# Patient Record
Sex: FEMALE | Race: OTHER | Employment: OTHER | URBAN - METROPOLITAN AREA
[De-identification: names, ages, dates, MRNs, and addresses within clinical notes are randomized per-mention and may not be internally consistent; named-entity substitution may affect disease eponyms.]

---

## 2017-07-15 NOTE — LETTER
09/05/19        Chayo Cancel  [address] [facility]      Dear Benoit Mcmillan,    [facility] records indicate that you have outstanding lab work and or testing that was ordered for you and has not yet been completed:  Orders Placed This Enc no

## 2018-02-08 ENCOUNTER — OFFICE VISIT (OUTPATIENT)
Dept: FAMILY MEDICINE CLINIC | Facility: CLINIC | Age: 58
End: 2018-02-08

## 2018-02-08 VITALS
BODY MASS INDEX: 19.33 KG/M2 | HEIGHT: 65 IN | SYSTOLIC BLOOD PRESSURE: 109 MMHG | HEART RATE: 66 BPM | TEMPERATURE: 98 F | WEIGHT: 116 LBS | DIASTOLIC BLOOD PRESSURE: 73 MMHG

## 2018-02-08 DIAGNOSIS — J01.10 ACUTE NON-RECURRENT FRONTAL SINUSITIS: Primary | ICD-10-CM

## 2018-02-08 PROCEDURE — 99213 OFFICE O/P EST LOW 20 MIN: CPT | Performed by: NURSE PRACTITIONER

## 2018-02-08 RX ORDER — BENZONATATE 100 MG/1
100 CAPSULE ORAL 3 TIMES DAILY PRN
Qty: 30 CAPSULE | Refills: 0 | Status: SHIPPED | OUTPATIENT
Start: 2018-02-08 | End: 2018-02-13

## 2018-02-08 RX ORDER — AMOXICILLIN AND CLAVULANATE POTASSIUM 875; 125 MG/1; MG/1
1 TABLET, FILM COATED ORAL 2 TIMES DAILY
Qty: 20 TABLET | Refills: 0 | Status: SHIPPED | OUTPATIENT
Start: 2018-02-08 | End: 2018-02-13

## 2018-02-08 NOTE — PROGRESS NOTES
HPI  3 weeks ago had gastroenteritis for 3 days. A week later developed headache, congestion with yellow nasal discharge. Has sore throat, intermittant fever. Has tried all different otc medications. con't to be nausea with some intermittant vomiting. 875-125 MG Oral Tab Take 1 tablet by mouth 2 (two) times daily. Disp: 20 tablet Rfl: 0   benzonatate (TESSALON PERLES) 100 MG Oral Cap Take 1 capsule (100 mg total) by mouth 3 (three) times daily as needed for cough.  Disp: 30 capsule Rfl: 0       Allergies 10 days  Tessalon perles 100 mg I po tid prn  Supportive care discussed    Please call if symptoms worsen or are not resolving. Discussed plan of care with pt and pt is in agreement. All questions answered. Pt to call with questions or concerns.

## 2018-02-08 NOTE — PATIENT INSTRUCTIONS
Yamilet fox (leslie kapoor)    Nadege to tricia dunlap?nione kellym wewn?trz ko?ci twarzy. S? one po? ?czone z wewn?trzn? cz??ci? nosa. Yamilet fox to quynh tony wy?cielaj?cej jam? nadege.  Yamilet fox mo?e wyst?pi? w · Mo?na stosowa? dost?pne bez recepty preparaty udra?niaj?ce nos, chyba, ?e lekarz zapisa? ju? inny lek o podobnym dzia?aniu. Atomizery do nosa dzia?aj? lida. karon Dos Santos fenylefryn? lub oksymetazolin? Tanda Armor delikatnie wydmuchaj n · Gor?jie cardenas?ca 100,4ºF (38ºC) lub wy?sza, lub wg wskazówek lekarza  · Napad  · Problemy z oddychaniem  · Objawy twin ust?puj? po 10 dniach  Date Last Reviewed: 4/13/2015  © 6125-4661 [employer].  [facility] [address] [place] [address]

## 2018-02-09 ENCOUNTER — TELEPHONE (OUTPATIENT)
Dept: FAMILY MEDICINE CLINIC | Facility: CLINIC | Age: 58
End: 2018-02-09

## 2018-02-09 NOTE — TELEPHONE ENCOUNTER
Patient has been taking Amoxicillin and has been with Diarrhea since  Patient would like to know if she can change the medication  Please advs

## 2018-02-10 NOTE — TELEPHONE ENCOUNTER
Pt informed of WESLEY LORA orders and verbalized understanding. Scheduled f/u appt with Nirmala Bojorquez. GRISELDA 2/13/18 @ 1:45pm ADO.

## 2018-02-10 NOTE — TELEPHONE ENCOUNTER
Tasked to on call Chris LORA. Pt started amoxicillin Thursday 8th for sinusitis but has been having diarrhea several times a day until present. Unable to eat because it goes right thru her.    Advised pt to stop antibiotic and explained the brat diet,

## 2018-02-13 ENCOUNTER — OFFICE VISIT (OUTPATIENT)
Dept: FAMILY MEDICINE CLINIC | Facility: CLINIC | Age: 58
End: 2018-02-13

## 2018-02-13 VITALS
SYSTOLIC BLOOD PRESSURE: 111 MMHG | HEART RATE: 57 BPM | HEIGHT: 65 IN | BODY MASS INDEX: 19.33 KG/M2 | WEIGHT: 116 LBS | DIASTOLIC BLOOD PRESSURE: 69 MMHG | TEMPERATURE: 97 F

## 2018-02-13 DIAGNOSIS — J01.00 ACUTE MAXILLARY SINUSITIS, RECURRENCE NOT SPECIFIED: Primary | ICD-10-CM

## 2018-02-13 PROCEDURE — 99213 OFFICE O/P EST LOW 20 MIN: CPT | Performed by: NURSE PRACTITIONER

## 2018-02-13 RX ORDER — GUAIFENESIN AND CODEINE PHOSPHATE 100; 10 MG/5ML; MG/5ML
SOLUTION ORAL
Qty: 118 ML | Refills: 0 | Status: SHIPPED | OUTPATIENT
Start: 2018-02-13 | End: 2018-08-03

## 2018-02-13 RX ORDER — LEVOFLOXACIN 500 MG/1
500 TABLET, FILM COATED ORAL DAILY
Qty: 10 TABLET | Refills: 0 | Status: SHIPPED | OUTPATIENT
Start: 2018-02-13 | End: 2018-02-23

## 2018-02-13 NOTE — PROGRESS NOTES
HPI    Pt here for f/u coughing and congestion. Did not take augmentin due to diarrhea-states that she gets severe diarrhea with pcn and forgot to mention when she was in office last week. Allergy list updated to include pcn allergy.  Con't to cough, feel 1 tsp every 6 hours for cough as needed. May cause drowsiness Disp: 118 mL Rfl: 0       Allergies:    Milk-Related Compou*    Diarrhea  Penicillins             Diarrhea    Physical Exam   Constitutional: She is oriented to person, place, and time.  She appe pcn  con't tessalon prn  Add levaquin 500 mg I po q day x 10 days  cheratussin ac 1 tsp po q 6 hrs prn-may cause drowsiness-do not drive while taking    Please call if symptoms worsen or are not resolving.             Discussed plan of care with pt and pt i

## 2018-02-13 NOTE — PATIENT INSTRUCTIONS
Acute Sinusitis    Acute sinusitis is irritation and swelling of the sinuses. It is usually caused by a viral infection after a common cold. Your doctor can help you find relief. What is acute sinusitis?   Sinuses are air-filled spaces in the skull behin © 5977-2373 [employer]. [facility] [facility], [facility] [employer]. All rights reserved. This information is not intended as a substitute for professional medical care. Always follow your healthcare professional's instructions.

## 2018-08-03 ENCOUNTER — OFFICE VISIT (OUTPATIENT)
Dept: FAMILY MEDICINE CLINIC | Facility: CLINIC | Age: 58
End: 2018-08-03

## 2018-08-03 VITALS
HEIGHT: 65 IN | WEIGHT: 115 LBS | BODY MASS INDEX: 19.16 KG/M2 | DIASTOLIC BLOOD PRESSURE: 83 MMHG | SYSTOLIC BLOOD PRESSURE: 137 MMHG | HEART RATE: 61 BPM

## 2018-08-03 DIAGNOSIS — R53.83 OTHER FATIGUE: ICD-10-CM

## 2018-08-03 DIAGNOSIS — Z00.00 WELL ADULT EXAM: Primary | ICD-10-CM

## 2018-08-03 DIAGNOSIS — S16.1XXA STRAIN OF NECK MUSCLE, INITIAL ENCOUNTER: ICD-10-CM

## 2018-08-03 DIAGNOSIS — R00.2 PALPITATION: ICD-10-CM

## 2018-08-03 DIAGNOSIS — H54.7 VISION LOSS: ICD-10-CM

## 2018-08-03 DIAGNOSIS — Z86.39 HISTORY OF THYROID NODULE: ICD-10-CM

## 2018-08-03 PROBLEM — J01.00 ACUTE MAXILLARY SINUSITIS: Status: RESOLVED | Noted: 2018-02-13 | Resolved: 2018-08-03

## 2018-08-03 PROCEDURE — 99214 OFFICE O/P EST MOD 30 MIN: CPT | Performed by: NURSE PRACTITIONER

## 2018-08-03 NOTE — PROGRESS NOTES
HPI    Pt here for c/o fatigue. Pt states she was diagnosed with a thyroid issue in [place] but has never been treated. Has a lot of tightness across post neck and shoulders. Works as a cleaning lady.  Had neck ct-no goiter, one 5 mm nodule  Has had issue Social History Main Topics   Smoking status: Never Smoker    Smokeless tobacco: Never Used    Alcohol use No    Drug use: Unknown     Other Topics Concern   None on file     Social History Narrative   None on file         No current outpatient prescription COMP METABOLIC PANEL (14) (Completed)    LIPID PANEL (Completed)    TSH W REFLEX TO FREE T4 (Completed)    VITAMIN B12 (Completed)    VITAMIN D, 25-HYDROXY    US THYROID (CPT=76536)    Neck strain    Relevant Orders    CBC, PLATELET; NO DIFFERENTIAL (Comp

## 2018-08-04 ENCOUNTER — APPOINTMENT (OUTPATIENT)
Dept: LAB | Age: 58
End: 2018-08-04
Attending: NURSE PRACTITIONER
Payer: COMMERCIAL

## 2018-08-04 DIAGNOSIS — S16.1XXA STRAIN OF NECK MUSCLE, INITIAL ENCOUNTER: ICD-10-CM

## 2018-08-04 DIAGNOSIS — R53.83 OTHER FATIGUE: ICD-10-CM

## 2018-08-04 DIAGNOSIS — R00.2 PALPITATION: ICD-10-CM

## 2018-08-04 DIAGNOSIS — H54.7 VISION LOSS: ICD-10-CM

## 2018-08-04 DIAGNOSIS — Z00.00 WELL ADULT EXAM: ICD-10-CM

## 2018-08-04 LAB
ALBUMIN SERPL BCP-MCNC: 4.2 G/DL (ref 3.5–4.8)
ALBUMIN/GLOB SERPL: 1.8 {RATIO} (ref 1–2)
ALP SERPL-CCNC: 63 U/L (ref 32–100)
ALT SERPL-CCNC: 17 U/L (ref 14–54)
ANION GAP SERPL CALC-SCNC: 6 MMOL/L (ref 0–18)
AST SERPL-CCNC: 17 U/L (ref 15–41)
BACTERIA UR QL AUTO: NEGATIVE /HPF
BILIRUB SERPL-MCNC: 0.7 MG/DL (ref 0.3–1.2)
BILIRUB UR QL: NEGATIVE
BUN SERPL-MCNC: 18 MG/DL (ref 8–20)
BUN/CREAT SERPL: 22 (ref 10–20)
CALCIUM SERPL-MCNC: 9.1 MG/DL (ref 8.5–10.5)
CHLORIDE SERPL-SCNC: 106 MMOL/L (ref 95–110)
CHOLEST SERPL-MCNC: 217 MG/DL (ref 110–200)
CLARITY UR: CLEAR
CO2 SERPL-SCNC: 30 MMOL/L (ref 22–32)
COLOR UR: YELLOW
CREAT SERPL-MCNC: 0.82 MG/DL (ref 0.5–1.5)
ERYTHROCYTE [DISTWIDTH] IN BLOOD BY AUTOMATED COUNT: 12.7 % (ref 11–15)
GLOBULIN PLAS-MCNC: 2.4 G/DL (ref 2.5–3.7)
GLUCOSE SERPL-MCNC: 97 MG/DL (ref 70–99)
GLUCOSE UR-MCNC: NEGATIVE MG/DL
HCT VFR BLD AUTO: 43.2 % (ref 35–48)
HDLC SERPL-MCNC: 63 MG/DL
HGB BLD-MCNC: 14.8 G/DL (ref 12–16)
HGB UR QL STRIP.AUTO: NEGATIVE
KETONES UR-MCNC: NEGATIVE MG/DL
LDLC SERPL CALC-MCNC: 136 MG/DL (ref 0–99)
MCH RBC QN AUTO: 32.2 PG (ref 27–32)
MCHC RBC AUTO-ENTMCNC: 34.3 G/DL (ref 32–37)
MCV RBC AUTO: 93.9 FL (ref 80–100)
NITRITE UR QL STRIP.AUTO: NEGATIVE
NONHDLC SERPL-MCNC: 154 MG/DL
OSMOLALITY UR CALC.SUM OF ELEC: 296 MOSM/KG (ref 275–295)
PATIENT FASTING: YES
PH UR: 6 [PH] (ref 5–8)
PLATELET # BLD AUTO: 196 K/UL (ref 140–400)
PMV BLD AUTO: 7.8 FL (ref 7.4–10.3)
POTASSIUM SERPL-SCNC: 3.9 MMOL/L (ref 3.3–5.1)
PROT SERPL-MCNC: 6.6 G/DL (ref 5.9–8.4)
PROT UR-MCNC: NEGATIVE MG/DL
RBC # BLD AUTO: 4.6 M/UL (ref 3.7–5.4)
RBC #/AREA URNS AUTO: 1 /HPF
SODIUM SERPL-SCNC: 142 MMOL/L (ref 136–144)
SP GR UR STRIP: 1.03 (ref 1–1.03)
TRIGL SERPL-MCNC: 90 MG/DL (ref 1–149)
TSH SERPL-ACNC: 3.71 UIU/ML (ref 0.45–5.33)
UROBILINOGEN UR STRIP-ACNC: <2
VIT B12 SERPL-MCNC: 584 PG/ML (ref 181–914)
VIT C UR-MCNC: NEGATIVE MG/DL
WBC # BLD AUTO: 4.5 K/UL (ref 4–11)
WBC #/AREA URNS AUTO: 5 /HPF

## 2018-08-04 PROCEDURE — 84443 ASSAY THYROID STIM HORMONE: CPT

## 2018-08-04 PROCEDURE — 85027 COMPLETE CBC AUTOMATED: CPT

## 2018-08-04 PROCEDURE — 80061 LIPID PANEL: CPT

## 2018-08-04 PROCEDURE — 81001 URINALYSIS AUTO W/SCOPE: CPT

## 2018-08-04 PROCEDURE — 93005 ELECTROCARDIOGRAM TRACING: CPT

## 2018-08-04 PROCEDURE — 80053 COMPREHEN METABOLIC PANEL: CPT

## 2018-08-04 PROCEDURE — 82607 VITAMIN B-12: CPT

## 2018-08-04 PROCEDURE — 82306 VITAMIN D 25 HYDROXY: CPT

## 2018-08-04 PROCEDURE — 93010 ELECTROCARDIOGRAM REPORT: CPT | Performed by: NURSE PRACTITIONER

## 2018-08-04 PROCEDURE — 36415 COLL VENOUS BLD VENIPUNCTURE: CPT

## 2018-08-06 ENCOUNTER — HOSPITAL ENCOUNTER (OUTPATIENT)
Dept: ULTRASOUND IMAGING | Age: 58
Discharge: HOME | End: 2018-08-06
Attending: NURSE PRACTITIONER
Payer: COMMERCIAL

## 2018-08-06 DIAGNOSIS — Z86.39 HISTORY OF THYROID NODULE: ICD-10-CM

## 2018-08-06 DIAGNOSIS — R53.83 OTHER FATIGUE: ICD-10-CM

## 2018-08-06 LAB — 25(OH)D3 SERPL-MCNC: 32.2 NG/ML

## 2018-08-06 PROCEDURE — 76536 US EXAM OF HEAD AND NECK: CPT | Performed by: NURSE PRACTITIONER

## 2018-08-10 ENCOUNTER — OFFICE VISIT (OUTPATIENT)
Dept: FAMILY MEDICINE CLINIC | Facility: CLINIC | Age: 58
End: 2018-08-10

## 2018-08-10 VITALS
WEIGHT: 115 LBS | HEIGHT: 65 IN | BODY MASS INDEX: 19.16 KG/M2 | DIASTOLIC BLOOD PRESSURE: 76 MMHG | HEART RATE: 65 BPM | SYSTOLIC BLOOD PRESSURE: 126 MMHG

## 2018-08-10 DIAGNOSIS — E03.9 ACQUIRED HYPOTHYROIDISM: ICD-10-CM

## 2018-08-10 DIAGNOSIS — E78.00 HYPERCHOLESTEROLEMIA: ICD-10-CM

## 2018-08-10 DIAGNOSIS — E04.1 THYROID NODULE: ICD-10-CM

## 2018-08-10 DIAGNOSIS — R87.619 ABNORMAL CERVICAL PAPANICOLAOU SMEAR, UNSPECIFIED ABNORMAL PAP FINDING: Primary | ICD-10-CM

## 2018-08-10 PROCEDURE — 99212 OFFICE O/P EST SF 10 MIN: CPT | Performed by: NURSE PRACTITIONER

## 2018-08-10 PROCEDURE — 99213 OFFICE O/P EST LOW 20 MIN: CPT | Performed by: NURSE PRACTITIONER

## 2018-08-10 RX ORDER — LEVOTHYROXINE SODIUM 0.03 MG/1
25 TABLET ORAL
COMMUNITY
End: 2018-09-11

## 2018-08-10 RX ORDER — LEVOTHYROXINE SODIUM 0.03 MG/1
25 TABLET ORAL
Qty: 90 TABLET | Refills: 3 | Status: SHIPPED | OUTPATIENT
Start: 2018-08-10 | End: 2018-11-20 | Stop reason: DRUGHIGH

## 2018-08-10 NOTE — PROGRESS NOTES
HPI  Pt here for review of labs-states has been taking levothyroxine 25 mcg intermittantly but was taking for 2 week prior to testing. Feels better when on levothyroxine.   Also needs referral to obgyn due to abn pap smear and biopsy done last year-was abno supple. No thyromegaly present. Cardiovascular: Normal rate and regular rhythm. Pulmonary/Chest: Effort normal. No respiratory distress. Abdominal: Soft. She exhibits no distension. Lymphadenopathy:     She has no cervical adenopathy.    Neurologic Protein5. 9 - 8.4 g/dL   6.6   Albumin3. 5 - 4.8 g/dL   4.2   Globulin2.5 - 3.7 g/dL   2.4    A/G Ratio1.0 - 2.0   1.8   Anion Gap0 - 18 mmol/L   6   BUN/CREA Ratio10.0 - 20.0   22.0    Calculated [id number] - 295 mOsm/kg   296    GFR, Non-African Pretty months           Relevant Medications    Levothyroxine Sodium 25 MCG Oral Tab    Levothyroxine Sodium 25 MCG Oral Tab    Other Relevant Orders    TSH W REFLEX TO FREE T4    Abnormal cervical Papanicolaou smear - Primary     Refer ob gyn         Relevant Or

## 2018-08-10 NOTE — PATIENT INSTRUCTIONS
Hypothyroidism       You have hypothyroidism. This means your thyroid gland is not making enough thyroid hormone. This hormone is vital to body growth and metabolism. If you don’t make enough, many body processes slow down.  This can cause symptoms throug · Don’t take other medicines with your thyroid hormone pill without checking with your provider first.  · Tell your provider if you have any side effects from your medicines that bother you, especially any chest pain or irregular heart beats.   · Never tay

## 2018-08-11 PROBLEM — E78.00 HYPERCHOLESTEROLEMIA: Status: ACTIVE | Noted: 2018-08-11

## 2018-08-11 NOTE — ASSESSMENT & PLAN NOTE
Recheck in 3 months    Enc healthy eating and exercise (combo cardio and weight training)  with goal of 150 min per week

## 2018-09-11 ENCOUNTER — OFFICE VISIT (OUTPATIENT)
Dept: OBGYN CLINIC | Facility: CLINIC | Age: 58
End: 2018-09-11

## 2018-09-11 VITALS
SYSTOLIC BLOOD PRESSURE: 124 MMHG | DIASTOLIC BLOOD PRESSURE: 76 MMHG | HEART RATE: 67 BPM | WEIGHT: 119.19 LBS | BODY MASS INDEX: 20 KG/M2

## 2018-09-11 DIAGNOSIS — R87.619 ABNORMAL CERVICAL PAPANICOLAOU SMEAR, UNSPECIFIED ABNORMAL PAP FINDING: ICD-10-CM

## 2018-09-11 DIAGNOSIS — N87.0 DYSPLASIA OF CERVIX, LOW GRADE (CIN 1): Primary | ICD-10-CM

## 2018-09-11 PROCEDURE — 99243 OFF/OP CNSLTJ NEW/EST LOW 30: CPT | Performed by: OBSTETRICS & GYNECOLOGY

## 2018-09-11 NOTE — PROGRESS NOTES
HPI:    Patient ID: Dennis Aleman is a 62year old female. HPI  New patient GYN consultation  Patient comes with a history of abnormal Pap tests and was last seen on July 30, 2017 by gynecologic oncologist at [facility].   The patient bleeding. States that she has not been sexually active since then. Review of Systems   Constitutional: Negative. Cardiovascular: Negative. Gastrointestinal: Negative. Genitourinary: Negative. Skin: Negative. Neurological: Negative.     Psyc papanicolaou smear, unspecified abnormal pap finding  Await Pap  FU 3 mos complete gyne exam and Pap. If pap abnormal, may need colposcopy.     Meds This Visit:  Requested Prescriptions      No prescriptions requested or ordered in this encounter       Federico Wallace

## 2018-09-12 LAB — HPV I/H RISK 1 DNA SPEC QL NAA+PROBE: POSITIVE

## 2018-09-14 ENCOUNTER — TELEPHONE (OUTPATIENT)
Dept: OBGYN CLINIC | Facility: CLINIC | Age: 58
End: 2018-09-14

## 2018-09-14 NOTE — TELEPHONE ENCOUNTER
Informed pt of Dr. Stacie Cardenas message below. Pt voices understanding.     Notes recorded by Tori Liang MD on 9/13/2018 at 1:57 PM CDT  Please inform of Pap test showing cells that are mildly changed from normal (atypical, ASCUS).  The reflex test showed

## 2018-10-22 ENCOUNTER — OFFICE VISIT (OUTPATIENT)
Dept: OBGYN CLINIC | Facility: CLINIC | Age: 58
End: 2018-10-22

## 2018-10-22 VITALS
SYSTOLIC BLOOD PRESSURE: 124 MMHG | DIASTOLIC BLOOD PRESSURE: 85 MMHG | WEIGHT: 118 LBS | BODY MASS INDEX: 20 KG/M2 | HEART RATE: 70 BPM

## 2018-10-22 DIAGNOSIS — R87.810 CERVICAL HIGH RISK HUMAN PAPILLOMAVIRUS (HPV) DNA TEST POSITIVE: ICD-10-CM

## 2018-10-22 DIAGNOSIS — R87.610 PAPANICOLAOU SMEAR OF CERVIX WITH ATYPICAL SQUAMOUS CELLS OF UNDETERMINED SIGNIFICANCE (ASC-US): ICD-10-CM

## 2018-10-22 DIAGNOSIS — R87.610 ASCUS WITH POSITIVE HIGH RISK HPV CERVICAL: Primary | ICD-10-CM

## 2018-10-22 DIAGNOSIS — R87.810 ASCUS WITH POSITIVE HIGH RISK HPV CERVICAL: Primary | ICD-10-CM

## 2018-10-22 PROCEDURE — 99212 OFFICE O/P EST SF 10 MIN: CPT | Performed by: OBSTETRICS & GYNECOLOGY

## 2018-10-22 PROCEDURE — 57454 BX/CURETT OF CERVIX W/SCOPE: CPT | Performed by: OBSTETRICS & GYNECOLOGY

## 2018-10-22 NOTE — PROCEDURES
Colpo w/Cx Biopsy and ECC     Pregnancy Results: negative from urine test   Birth control method(s) used: menopausal    Consent signed. Procedure discussed with patient in detail including indication, risk, benefits, alternatives and complications.     Pro

## 2018-10-22 NOTE — PROGRESS NOTES
HPI:    Patient ID: Azul Crain is a 62year old female. HPI  GYN consult note  Discussed results of Pap test which showed atypical squamous cells of undetermined significance and the presence of positive high risk HPV.   Patient's previous Pap test

## 2018-10-25 ENCOUNTER — TELEPHONE (OUTPATIENT)
Dept: OBGYN CLINIC | Facility: CLINIC | Age: 58
End: 2018-10-25

## 2018-10-25 NOTE — TELEPHONE ENCOUNTER
Please inform patient (mostly [place] speaking- maybe Chandler Newell can assist) of results of her colp cervical biopsies. The scraping from the canal (endocervical curettage, ECC) showed some mildly abnormal cells, the outer ones did not. This does not indicate cancer, but is not entirely normal.  Given her prior Pap results and because she has already seen Dr. Kimberly Cleary from [facility] onc who advised follow up, she should follow up with her for this and for ongoing management. We can forward her results to [facility].

## 2018-10-29 ENCOUNTER — MED REC SCAN ONLY (OUTPATIENT)
Dept: OBGYN CLINIC | Facility: CLINIC | Age: 58
End: 2018-10-29

## 2018-10-29 ENCOUNTER — TELEPHONE (OUTPATIENT)
Dept: OBGYN CLINIC | Facility: CLINIC | Age: 58
End: 2018-10-29

## 2018-10-29 NOTE — TELEPHONE ENCOUNTER
Please inform patient (mostly [place] speaking- maybe Adrianne Sky can assist) of results of her colp cervical biopsies. The scraping from the canal (endocervical curettage, ECC) showed some mildly abnormal cells, the outer ones did not. This does not indicate cancer, but is not entirely normal.  Given her prior Pap results and because she has already seen Dr. Chasity Sanchez from [facility] onc who advised follow up, she should follow up with her for this and for ongoing management. We can forward her results to [facility].

## 2018-10-30 NOTE — TELEPHONE ENCOUNTER
Patient informed per [facility] follow up with Dr Mark Munroe , per patient she does not want to see Dr Fela Sun was unhappy with Dr Mark Munroe services and biopsy Patient is wondering if there is anyone else she can see besides Dr Mark Munroe, patient is also wondering perhaps is there is a possibility she can have JACKSON. Per Ms Ugarte Hair she does not want any more kids or continue still with biopsys, she's wondering if there is a way for consultation to discuss AJCKSON with you

## 2018-11-02 ENCOUNTER — TELEPHONE (OUTPATIENT)
Dept: OBGYN CLINIC | Facility: CLINIC | Age: 58
End: 2018-11-02

## 2018-11-02 ENCOUNTER — OFFICE VISIT (OUTPATIENT)
Dept: OBGYN CLINIC | Facility: CLINIC | Age: 58
End: 2018-11-02

## 2018-11-02 VITALS — DIASTOLIC BLOOD PRESSURE: 75 MMHG | SYSTOLIC BLOOD PRESSURE: 114 MMHG | HEART RATE: 75 BPM

## 2018-11-02 DIAGNOSIS — N87.0 DYSPLASIA OF CERVIX, LOW GRADE (CIN 1): Primary | ICD-10-CM

## 2018-11-02 DIAGNOSIS — N88.9 ABNORMAL CERVIX FINDING: ICD-10-CM

## 2018-11-02 DIAGNOSIS — R87.610 ASCUS WITH POSITIVE HIGH RISK HPV CERVICAL: ICD-10-CM

## 2018-11-02 DIAGNOSIS — N87.9 CERVICAL DYSPLASIA: Primary | ICD-10-CM

## 2018-11-02 DIAGNOSIS — R87.810 ASCUS WITH POSITIVE HIGH RISK HPV CERVICAL: ICD-10-CM

## 2018-11-02 PROCEDURE — 99213 OFFICE O/P EST LOW 20 MIN: CPT | Performed by: OBSTETRICS & GYNECOLOGY

## 2018-11-02 NOTE — TELEPHONE ENCOUNTER
Please schedule surgery. Patient requests ASAP. Diagnosis: Cervical dysplasia  Procedure: Total abdominal hysterectomy; prophylactic bilateral salpingectomy  Needs medical clearance from primary care physician.

## 2018-11-02 NOTE — PROGRESS NOTES
HPI:    Patient ID: Adeola Wood is a 62year old female. HPI  GYN consultation  Patient received the results of colposcopy and biopsy.   At the time of colposcopy it was evident that the patient had abnormal cervical anatomy with attenuation and scar minute face to face  Meds This Visit:  Requested Prescriptions      No prescriptions requested or ordered in this encounter       Imaging & Referrals:  None       #[id number]

## 2018-11-05 NOTE — TELEPHONE ENCOUNTER
Scheduled this first available in the OR 11/28 @ 9:30. Called pt to inform of sx details and no VM available.   Will need med clearance from her PCP prior to sx.    -assist pending

## 2018-11-06 NOTE — TELEPHONE ENCOUNTER
Spoke to pt and informed her of sx details. She Understood and verbalized agreement. Will call her PCP for [facility] a week from her sx      (weds11/28). Asked to call back with appt date and name of PCP to follow up. Dr. Michi Naidu, would you be able to assist Dr. Gamaliel Mcgowan after your morining case?

## 2018-11-15 ENCOUNTER — OFFICE VISIT (OUTPATIENT)
Dept: FAMILY MEDICINE CLINIC | Facility: CLINIC | Age: 58
End: 2018-11-15

## 2018-11-15 VITALS
DIASTOLIC BLOOD PRESSURE: 71 MMHG | WEIGHT: 120 LBS | SYSTOLIC BLOOD PRESSURE: 113 MMHG | BODY MASS INDEX: 19.99 KG/M2 | HEIGHT: 65 IN | HEART RATE: 71 BPM

## 2018-11-15 DIAGNOSIS — Z01.818 PRE-OP EXAMINATION: ICD-10-CM

## 2018-11-15 DIAGNOSIS — R87.810 ASCUS WITH POSITIVE HIGH RISK HPV CERVICAL: ICD-10-CM

## 2018-11-15 DIAGNOSIS — E03.9 ACQUIRED HYPOTHYROIDISM: Primary | ICD-10-CM

## 2018-11-15 DIAGNOSIS — N88.9 ABNORMAL CERVIX FINDING: ICD-10-CM

## 2018-11-15 DIAGNOSIS — Z23 INFLUENZA VACCINE NEEDED: ICD-10-CM

## 2018-11-15 DIAGNOSIS — R87.610 ASCUS WITH POSITIVE HIGH RISK HPV CERVICAL: ICD-10-CM

## 2018-11-15 DIAGNOSIS — N87.0 DYSPLASIA OF CERVIX, LOW GRADE (CIN 1): ICD-10-CM

## 2018-11-15 PROBLEM — H54.7 VISION LOSS: Status: RESOLVED | Noted: 2018-08-03 | Resolved: 2018-11-15

## 2018-11-15 PROBLEM — R00.2 PALPITATION: Status: RESOLVED | Noted: 2018-08-03 | Resolved: 2018-11-15

## 2018-11-15 PROBLEM — E78.00 HYPERCHOLESTEROLEMIA: Status: RESOLVED | Noted: 2018-08-11 | Resolved: 2018-11-15

## 2018-11-15 PROBLEM — S16.1XXA NECK STRAIN: Status: RESOLVED | Noted: 2018-08-03 | Resolved: 2018-11-15

## 2018-11-15 PROBLEM — R53.83 OTHER FATIGUE: Status: RESOLVED | Noted: 2018-08-03 | Resolved: 2018-11-15

## 2018-11-15 PROCEDURE — 90686 IIV4 VACC NO PRSV 0.5 ML IM: CPT | Performed by: NURSE PRACTITIONER

## 2018-11-15 PROCEDURE — 99214 OFFICE O/P EST MOD 30 MIN: CPT | Performed by: NURSE PRACTITIONER

## 2018-11-15 PROCEDURE — 90471 IMMUNIZATION ADMIN: CPT | Performed by: NURSE PRACTITIONER

## 2018-11-15 NOTE — PROGRESS NOTES
HPI  Pt presents for pre-op clearance for and hysterectomy with salphingectomy on 11/28 by Dr Laura Daniels. Has h/o abn pap and does not want to \"watch and wait\".      No past surgical hx    Has hypothyroid-takes levothyroxine 0.25 mg I po q am    No known aller strain: Not on file      Food insecurity - worry: Not on file      Food insecurity - inability: Not on file      Transportation needs - medical: Not on file      Transportation needs - non-medical: Not on file    Occupational History      Not on file    To Skin: Skin is warm and dry. No rash noted. Psychiatric: She has a normal mood and affect.  Her behavior is normal. Judgment and thought content normal.       Assessment and Plan:  Problem List Items Addressed This Visit        Endocrine    Acquired hypo

## 2018-11-17 ENCOUNTER — APPOINTMENT (OUTPATIENT)
Dept: LAB | Age: 58
End: 2018-11-17
Attending: NURSE PRACTITIONER
Payer: COMMERCIAL

## 2018-11-17 ENCOUNTER — HOSPITAL ENCOUNTER (OUTPATIENT)
Dept: GENERAL RADIOLOGY | Age: 58
Discharge: HOME | End: 2018-11-17
Attending: NURSE PRACTITIONER
Payer: COMMERCIAL

## 2018-11-17 ENCOUNTER — LAB ENCOUNTER (OUTPATIENT)
Dept: LAB | Age: 58
End: 2018-11-17
Attending: NURSE PRACTITIONER
Payer: COMMERCIAL

## 2018-11-17 DIAGNOSIS — Z01.818 PRE-OP EXAMINATION: ICD-10-CM

## 2018-11-17 DIAGNOSIS — Z01.818 PREOP EXAMINATION: Primary | ICD-10-CM

## 2018-11-17 DIAGNOSIS — E03.9 ACQUIRED HYPOTHYROIDISM: ICD-10-CM

## 2018-11-17 DIAGNOSIS — E04.1 THYROID NODULE: ICD-10-CM

## 2018-11-17 DIAGNOSIS — E78.00 HYPERCHOLESTEROLEMIA: ICD-10-CM

## 2018-11-17 PROCEDURE — 84439 ASSAY OF FREE THYROXINE: CPT

## 2018-11-17 PROCEDURE — 85610 PROTHROMBIN TIME: CPT

## 2018-11-17 PROCEDURE — 80061 LIPID PANEL: CPT

## 2018-11-17 PROCEDURE — 80053 COMPREHEN METABOLIC PANEL: CPT

## 2018-11-17 PROCEDURE — 93005 ELECTROCARDIOGRAM TRACING: CPT

## 2018-11-17 PROCEDURE — 36415 COLL VENOUS BLD VENIPUNCTURE: CPT

## 2018-11-17 PROCEDURE — 87081 CULTURE SCREEN ONLY: CPT

## 2018-11-17 PROCEDURE — 93010 ELECTROCARDIOGRAM REPORT: CPT | Performed by: NURSE PRACTITIONER

## 2018-11-17 PROCEDURE — 85027 COMPLETE CBC AUTOMATED: CPT

## 2018-11-17 PROCEDURE — 85730 THROMBOPLASTIN TIME PARTIAL: CPT

## 2018-11-17 PROCEDURE — 71046 X-RAY EXAM CHEST 2 VIEWS: CPT | Performed by: NURSE PRACTITIONER

## 2018-11-17 PROCEDURE — 84443 ASSAY THYROID STIM HORMONE: CPT

## 2018-11-18 DIAGNOSIS — E03.9 ACQUIRED HYPOTHYROIDISM: Primary | ICD-10-CM

## 2018-11-18 RX ORDER — LEVOTHYROXINE SODIUM 0.05 MG/1
50 TABLET ORAL
Qty: 90 TABLET | Refills: 3 | Status: SHIPPED | OUTPATIENT
Start: 2018-11-18 | End: 2019-08-02

## 2018-11-20 ENCOUNTER — TELEPHONE (OUTPATIENT)
Dept: OTHER | Age: 58
End: 2018-11-20

## 2018-11-20 RX ORDER — LOPERAMIDE HYDROCHLORIDE 2 MG/1
2 CAPSULE ORAL 4 TIMES DAILY PRN
Status: ON HOLD | COMMUNITY
End: 2018-11-30

## 2018-11-20 NOTE — TELEPHONE ENCOUNTER
Verified name and .   Results/recommendations reviewed with pt and pt verb understanding          Notes recorded by GRISELDA Arora, STEVEN-C on 2018 at 9:16 PM CST  Your labs show that your thyroid is still not functioning normally-please inc

## 2018-11-21 ENCOUNTER — LAB ENCOUNTER (OUTPATIENT)
Dept: LAB | Age: 58
End: 2018-11-21
Attending: OBSTETRICS & GYNECOLOGY
Payer: COMMERCIAL

## 2018-11-21 ENCOUNTER — TELEPHONE (OUTPATIENT)
Dept: FAMILY MEDICINE CLINIC | Facility: CLINIC | Age: 58
End: 2018-11-21

## 2018-11-21 DIAGNOSIS — Z01.818 PREOPERATIVE TESTING: ICD-10-CM

## 2018-11-21 PROCEDURE — 86850 RBC ANTIBODY SCREEN: CPT

## 2018-11-21 PROCEDURE — 86900 BLOOD TYPING SEROLOGIC ABO: CPT

## 2018-11-21 PROCEDURE — 86901 BLOOD TYPING SEROLOGIC RH(D): CPT

## 2018-11-21 PROCEDURE — 36415 COLL VENOUS BLD VENIPUNCTURE: CPT

## 2018-11-21 NOTE — TELEPHONE ENCOUNTER
Ada/Dr. Ellen Camarillo office called in stating that they do not see a clearance for Pt's procedure next week. They are requesting clearance be faxed to their office at:    Fax# [phone]: Surgery Scheduler    Please advise.

## 2018-11-21 NOTE — TELEPHONE ENCOUNTER
CHLOE called the patient and the she needs re-confirmation of what the her sx exactly consist of. Dr. Long Royal, you saw the pt 11/2. Would you like us to schedule another visit with you for her questions?

## 2018-11-21 NOTE — TELEPHONE ENCOUNTER
Pre op reviewed and signed by Dr Kimberlee Nielson. Faxed to Dr Victoria Marker office to number below.    Confirmation received

## 2018-11-21 NOTE — TELEPHONE ENCOUNTER
We already had a preop discussion regarding her surgery-  JACKSON and bilat salpingectomy. I think patient gets anxious and forgets. There is also a language barrier to a certain extent as pt mostly [place] speaking. Please call patient and confirm she knows the planned surgical procedure and if she wishes to have another preop visit to discuss. Also we still awaiting medical clearance.

## 2018-11-26 NOTE — TELEPHONE ENCOUNTER
Called pts PCP's office Friday and they faxed her med clearance. Office note has been placed on Dr. Zamzam Carrillo desk as of Friday 11/23. RN, please call pt.

## 2018-11-27 NOTE — H&P
[facility] Patient Status:  Surgery Admit    1960 MRN [id number]   Location [place] [address] Attending Gene Low MD   Hosp Day # 0 PCP John Traylor MD medical problems including the flu, pulmonary problems and GI problems and she was unable to follow-up. She recently saw her new primary care physician nurse practitioner was referred here.     At the time of colposcopy it was evident that the patient had Systems:     Normal menses, no abnormal bleeding, pelvic pain or discharge, no breast pain or new or enlarging lumps on self exam, no side effects of hormonal medications, no vaginal bleeding, no discharge or pelvic pain, no hot flashes.   Gynecological: as inguinal adenopathy present. Left: No inguinal adenopathy present. Neurological: She is alert. Skin: Skin is warm.    Psychiatric: Her behavior is normal. Judgment normal.       Results:   Diagnostics:        Data Review:   Lab Results   Componen

## 2018-11-27 NOTE — TELEPHONE ENCOUNTER
Pt called by polish speaking LPN in office to review procedure and surgery details. All questions answered.

## 2018-11-28 ENCOUNTER — ANESTHESIA (OUTPATIENT)
Dept: SURGERY | Facility: HOSPITAL | Age: 58
DRG: 743 | End: 2018-11-28
Payer: COMMERCIAL

## 2018-11-28 ENCOUNTER — HOSPITAL ENCOUNTER (INPATIENT)
Facility: HOSPITAL | Age: 58
LOS: 2 days | Discharge: HOME | DRG: 743 | End: 2018-11-30
Attending: OBSTETRICS & GYNECOLOGY | Admitting: OBSTETRICS & GYNECOLOGY
Payer: COMMERCIAL

## 2018-11-28 ENCOUNTER — ANESTHESIA EVENT (OUTPATIENT)
Dept: SURGERY | Facility: HOSPITAL | Age: 58
DRG: 743 | End: 2018-11-28
Payer: COMMERCIAL

## 2018-11-28 DIAGNOSIS — Z01.818 PREOPERATIVE TESTING: Primary | ICD-10-CM

## 2018-11-28 DIAGNOSIS — N87.9 CERVICAL DYSPLASIA: ICD-10-CM

## 2018-11-28 PROCEDURE — 58150 TOTAL HYSTERECTOMY: CPT | Performed by: OBSTETRICS & GYNECOLOGY

## 2018-11-28 PROCEDURE — 0UT90ZZ RESECTION OF UTERUS, OPEN APPROACH: ICD-10-PCS | Performed by: OBSTETRICS & GYNECOLOGY

## 2018-11-28 PROCEDURE — 0UT70ZZ RESECTION OF BILATERAL FALLOPIAN TUBES, OPEN APPROACH: ICD-10-PCS | Performed by: OBSTETRICS & GYNECOLOGY

## 2018-11-28 RX ORDER — POLYETHYLENE GLYCOL 3350 17 G/17G
17 POWDER, FOR SOLUTION ORAL DAILY PRN
Status: DISCONTINUED | OUTPATIENT
Start: 2018-11-28 | End: 2018-11-30

## 2018-11-28 RX ORDER — ROCURONIUM BROMIDE 10 MG/ML
INJECTION, SOLUTION INTRAVENOUS AS NEEDED
Status: DISCONTINUED | OUTPATIENT
Start: 2018-11-28 | End: 2018-11-28 | Stop reason: SURG

## 2018-11-28 RX ORDER — HYDROCODONE BITARTRATE AND ACETAMINOPHEN 7.5; 325 MG/1; MG/1
2 TABLET ORAL EVERY 4 HOURS PRN
Status: DISCONTINUED | OUTPATIENT
Start: 2018-11-28 | End: 2018-11-29

## 2018-11-28 RX ORDER — MIDAZOLAM HYDROCHLORIDE 1 MG/ML
INJECTION INTRAMUSCULAR; INTRAVENOUS AS NEEDED
Status: DISCONTINUED | OUTPATIENT
Start: 2018-11-28 | End: 2018-11-28 | Stop reason: SURG

## 2018-11-28 RX ORDER — HYDROCODONE BITARTRATE AND ACETAMINOPHEN 5; 325 MG/1; MG/1
2 TABLET ORAL AS NEEDED
Status: DISCONTINUED | OUTPATIENT
Start: 2018-11-28 | End: 2018-11-28 | Stop reason: HOSPADM

## 2018-11-28 RX ORDER — MORPHINE SULFATE 2 MG/ML
1 INJECTION, SOLUTION INTRAMUSCULAR; INTRAVENOUS EVERY 2 HOUR PRN
Status: DISCONTINUED | OUTPATIENT
Start: 2018-11-28 | End: 2018-11-29

## 2018-11-28 RX ORDER — SODIUM CHLORIDE 0.9 % (FLUSH) 0.9 %
10 SYRINGE (ML) INJECTION AS NEEDED
Status: DISCONTINUED | OUTPATIENT
Start: 2018-11-28 | End: 2018-11-30

## 2018-11-28 RX ORDER — KETOROLAC TROMETHAMINE 15 MG/ML
INJECTION, SOLUTION INTRAMUSCULAR; INTRAVENOUS AS NEEDED
Status: DISCONTINUED | OUTPATIENT
Start: 2018-11-28 | End: 2018-11-28 | Stop reason: SURG

## 2018-11-28 RX ORDER — HYDROCODONE BITARTRATE AND ACETAMINOPHEN 5; 325 MG/1; MG/1
1 TABLET ORAL AS NEEDED
Status: DISCONTINUED | OUTPATIENT
Start: 2018-11-28 | End: 2018-11-28 | Stop reason: HOSPADM

## 2018-11-28 RX ORDER — SODIUM CHLORIDE, SODIUM LACTATE, POTASSIUM CHLORIDE, CALCIUM CHLORIDE 600; 310; 30; 20 MG/100ML; MG/100ML; MG/100ML; MG/100ML
INJECTION, SOLUTION INTRAVENOUS CONTINUOUS
Status: DISCONTINUED | OUTPATIENT
Start: 2018-11-28 | End: 2018-11-29

## 2018-11-28 RX ORDER — ACETAMINOPHEN 325 MG/1
650 TABLET ORAL EVERY 4 HOURS PRN
Status: DISCONTINUED | OUTPATIENT
Start: 2018-11-28 | End: 2018-11-29

## 2018-11-28 RX ORDER — HALOPERIDOL 5 MG/ML
0.25 INJECTION INTRAMUSCULAR ONCE AS NEEDED
Status: DISCONTINUED | OUTPATIENT
Start: 2018-11-28 | End: 2018-11-28 | Stop reason: HOSPADM

## 2018-11-28 RX ORDER — DOCUSATE SODIUM 100 MG/1
100 CAPSULE, LIQUID FILLED ORAL 2 TIMES DAILY
Status: DISCONTINUED | OUTPATIENT
Start: 2018-11-28 | End: 2018-11-30

## 2018-11-28 RX ORDER — MORPHINE SULFATE 4 MG/ML
4 INJECTION, SOLUTION INTRAMUSCULAR; INTRAVENOUS EVERY 2 HOUR PRN
Status: DISCONTINUED | OUTPATIENT
Start: 2018-11-28 | End: 2018-11-28

## 2018-11-28 RX ORDER — ONDANSETRON 2 MG/ML
INJECTION INTRAMUSCULAR; INTRAVENOUS AS NEEDED
Status: DISCONTINUED | OUTPATIENT
Start: 2018-11-28 | End: 2018-11-28 | Stop reason: SURG

## 2018-11-28 RX ORDER — METOCLOPRAMIDE 10 MG/1
10 TABLET ORAL ONCE
Status: DISCONTINUED | OUTPATIENT
Start: 2018-11-28 | End: 2018-11-28 | Stop reason: HOSPADM

## 2018-11-28 RX ORDER — MORPHINE SULFATE 2 MG/ML
2 INJECTION, SOLUTION INTRAMUSCULAR; INTRAVENOUS EVERY 2 HOUR PRN
Status: DISCONTINUED | OUTPATIENT
Start: 2018-11-28 | End: 2018-11-28

## 2018-11-28 RX ORDER — BISACODYL 10 MG
10 SUPPOSITORY, RECTAL RECTAL
Status: DISCONTINUED | OUTPATIENT
Start: 2018-11-28 | End: 2018-11-30

## 2018-11-28 RX ORDER — ENOXAPARIN SODIUM 100 MG/ML
40 INJECTION SUBCUTANEOUS DAILY
Status: DISCONTINUED | OUTPATIENT
Start: 2018-11-28 | End: 2018-11-30

## 2018-11-28 RX ORDER — HYDROCODONE BITARTRATE AND ACETAMINOPHEN 7.5; 325 MG/1; MG/1
1 TABLET ORAL EVERY 4 HOURS PRN
Status: DISCONTINUED | OUTPATIENT
Start: 2018-11-28 | End: 2018-11-29

## 2018-11-28 RX ORDER — MORPHINE SULFATE 2 MG/ML
1 INJECTION, SOLUTION INTRAMUSCULAR; INTRAVENOUS EVERY 2 HOUR PRN
Status: DISCONTINUED | OUTPATIENT
Start: 2018-11-28 | End: 2018-11-28

## 2018-11-28 RX ORDER — ACETAMINOPHEN 500 MG
1000 TABLET ORAL ONCE
Status: COMPLETED | OUTPATIENT
Start: 2018-11-28 | End: 2018-11-28

## 2018-11-28 RX ORDER — ZOLPIDEM TARTRATE 5 MG/1
5 TABLET ORAL NIGHTLY PRN
Status: DISCONTINUED | OUTPATIENT
Start: 2018-11-28 | End: 2018-11-30

## 2018-11-28 RX ORDER — MORPHINE SULFATE 4 MG/ML
2 INJECTION, SOLUTION INTRAMUSCULAR; INTRAVENOUS EVERY 10 MIN PRN
Status: DISCONTINUED | OUTPATIENT
Start: 2018-11-28 | End: 2018-11-28 | Stop reason: HOSPADM

## 2018-11-28 RX ORDER — FAMOTIDINE 20 MG/1
20 TABLET ORAL ONCE
Status: DISCONTINUED | OUTPATIENT
Start: 2018-11-28 | End: 2018-11-28 | Stop reason: HOSPADM

## 2018-11-28 RX ORDER — SODIUM CHLORIDE, SODIUM LACTATE, POTASSIUM CHLORIDE, CALCIUM CHLORIDE 600; 310; 30; 20 MG/100ML; MG/100ML; MG/100ML; MG/100ML
INJECTION, SOLUTION INTRAVENOUS CONTINUOUS
Status: DISCONTINUED | OUTPATIENT
Start: 2018-11-28 | End: 2018-11-28 | Stop reason: HOSPADM

## 2018-11-28 RX ORDER — MORPHINE SULFATE 4 MG/ML
4 INJECTION, SOLUTION INTRAMUSCULAR; INTRAVENOUS EVERY 10 MIN PRN
Status: DISCONTINUED | OUTPATIENT
Start: 2018-11-28 | End: 2018-11-28 | Stop reason: HOSPADM

## 2018-11-28 RX ORDER — CLINDAMYCIN PHOSPHATE 900 MG/50ML
900 INJECTION INTRAVENOUS ONCE
Status: DISCONTINUED | OUTPATIENT
Start: 2018-11-28 | End: 2018-11-28 | Stop reason: ALTCHOICE

## 2018-11-28 RX ORDER — NALOXONE HYDROCHLORIDE 0.4 MG/ML
80 INJECTION, SOLUTION INTRAMUSCULAR; INTRAVENOUS; SUBCUTANEOUS AS NEEDED
Status: DISCONTINUED | OUTPATIENT
Start: 2018-11-28 | End: 2018-11-28 | Stop reason: HOSPADM

## 2018-11-28 RX ORDER — MORPHINE SULFATE 10 MG/ML
6 INJECTION, SOLUTION INTRAMUSCULAR; INTRAVENOUS EVERY 10 MIN PRN
Status: DISCONTINUED | OUTPATIENT
Start: 2018-11-28 | End: 2018-11-28 | Stop reason: HOSPADM

## 2018-11-28 RX ORDER — METOCLOPRAMIDE HYDROCHLORIDE 5 MG/ML
10 INJECTION INTRAMUSCULAR; INTRAVENOUS EVERY 8 HOURS PRN
Status: DISCONTINUED | OUTPATIENT
Start: 2018-11-28 | End: 2018-11-30

## 2018-11-28 RX ORDER — LIDOCAINE HYDROCHLORIDE 10 MG/ML
INJECTION, SOLUTION EPIDURAL; INFILTRATION; INTRACAUDAL; PERINEURAL AS NEEDED
Status: DISCONTINUED | OUTPATIENT
Start: 2018-11-28 | End: 2018-11-28 | Stop reason: SURG

## 2018-11-28 RX ORDER — GLYCOPYRROLATE 0.2 MG/ML
INJECTION INTRAMUSCULAR; INTRAVENOUS AS NEEDED
Status: DISCONTINUED | OUTPATIENT
Start: 2018-11-28 | End: 2018-11-28 | Stop reason: SURG

## 2018-11-28 RX ORDER — DIPHENHYDRAMINE HYDROCHLORIDE 50 MG/ML
12.5 INJECTION INTRAMUSCULAR; INTRAVENOUS EVERY 4 HOURS PRN
Status: DISCONTINUED | OUTPATIENT
Start: 2018-11-28 | End: 2018-11-30

## 2018-11-28 RX ORDER — ONDANSETRON 2 MG/ML
4 INJECTION INTRAMUSCULAR; INTRAVENOUS ONCE AS NEEDED
Status: DISCONTINUED | OUTPATIENT
Start: 2018-11-28 | End: 2018-11-28 | Stop reason: HOSPADM

## 2018-11-28 RX ORDER — MORPHINE SULFATE 4 MG/ML
4 INJECTION, SOLUTION INTRAMUSCULAR; INTRAVENOUS EVERY 2 HOUR PRN
Status: DISCONTINUED | OUTPATIENT
Start: 2018-11-28 | End: 2018-11-29

## 2018-11-28 RX ORDER — BUPIVACAINE HYDROCHLORIDE AND EPINEPHRINE 2.5; 5 MG/ML; UG/ML
INJECTION, SOLUTION INFILTRATION; PERINEURAL AS NEEDED
Status: DISCONTINUED | OUTPATIENT
Start: 2018-11-28 | End: 2018-11-28 | Stop reason: HOSPADM

## 2018-11-28 RX ORDER — SODIUM PHOSPHATE, DIBASIC AND SODIUM PHOSPHATE, MONOBASIC 7; 19 G/133ML; G/133ML
1 ENEMA RECTAL ONCE AS NEEDED
Status: DISCONTINUED | OUTPATIENT
Start: 2018-11-28 | End: 2018-11-30

## 2018-11-28 RX ORDER — MORPHINE SULFATE 2 MG/ML
2 INJECTION, SOLUTION INTRAMUSCULAR; INTRAVENOUS EVERY 2 HOUR PRN
Status: DISCONTINUED | OUTPATIENT
Start: 2018-11-28 | End: 2018-11-29

## 2018-11-28 RX ORDER — NEOSTIGMINE METHYLSULFATE 0.5 MG/ML
INJECTION INTRAVENOUS AS NEEDED
Status: DISCONTINUED | OUTPATIENT
Start: 2018-11-28 | End: 2018-11-28 | Stop reason: SURG

## 2018-11-28 RX ORDER — LEVOTHYROXINE SODIUM 0.05 MG/1
50 TABLET ORAL
Status: DISCONTINUED | OUTPATIENT
Start: 2018-11-28 | End: 2018-11-30

## 2018-11-28 RX ORDER — DEXAMETHASONE SODIUM PHOSPHATE 4 MG/ML
VIAL (ML) INJECTION AS NEEDED
Status: DISCONTINUED | OUTPATIENT
Start: 2018-11-28 | End: 2018-11-28 | Stop reason: SURG

## 2018-11-28 RX ORDER — SODIUM CHLORIDE 0.9 % (FLUSH) 0.9 %
10 SYRINGE (ML) INJECTION AS NEEDED
Status: DISCONTINUED | OUTPATIENT
Start: 2018-11-28 | End: 2018-11-28 | Stop reason: HOSPADM

## 2018-11-28 RX ORDER — CEFAZOLIN SODIUM/WATER 2 G/20 ML
2 SYRINGE (ML) INTRAVENOUS
Status: COMPLETED | OUTPATIENT
Start: 2018-11-28 | End: 2018-11-28

## 2018-11-28 RX ORDER — SODIUM CHLORIDE, SODIUM LACTATE, POTASSIUM CHLORIDE, CALCIUM CHLORIDE 600; 310; 30; 20 MG/100ML; MG/100ML; MG/100ML; MG/100ML
INJECTION, SOLUTION INTRAVENOUS CONTINUOUS
Status: DISCONTINUED | OUTPATIENT
Start: 2018-11-28 | End: 2018-11-28

## 2018-11-28 RX ADMIN — ROCURONIUM BROMIDE 10 MG: 10 INJECTION, SOLUTION INTRAVENOUS at 10:51:00

## 2018-11-28 RX ADMIN — SODIUM CHLORIDE, SODIUM LACTATE, POTASSIUM CHLORIDE, CALCIUM CHLORIDE: 600; 310; 30; 20 INJECTION, SOLUTION INTRAVENOUS at 10:23:00

## 2018-11-28 RX ADMIN — SODIUM CHLORIDE, SODIUM LACTATE, POTASSIUM CHLORIDE, CALCIUM CHLORIDE: 600; 310; 30; 20 INJECTION, SOLUTION INTRAVENOUS at 12:02:00

## 2018-11-28 RX ADMIN — ONDANSETRON 4 MG: 2 INJECTION INTRAMUSCULAR; INTRAVENOUS at 11:38:00

## 2018-11-28 RX ADMIN — LIDOCAINE HYDROCHLORIDE 25 MG: 10 INJECTION, SOLUTION EPIDURAL; INFILTRATION; INTRACAUDAL; PERINEURAL at 10:29:00

## 2018-11-28 RX ADMIN — NEOSTIGMINE METHYLSULFATE 2.5 MG: 0.5 INJECTION INTRAVENOUS at 11:51:00

## 2018-11-28 RX ADMIN — GLYCOPYRROLATE 0.5 MG: 0.2 INJECTION INTRAMUSCULAR; INTRAVENOUS at 11:51:00

## 2018-11-28 RX ADMIN — ROCURONIUM BROMIDE 10 MG: 10 INJECTION, SOLUTION INTRAVENOUS at 11:29:00

## 2018-11-28 RX ADMIN — DEXAMETHASONE SODIUM PHOSPHATE 4 MG: 4 MG/ML VIAL (ML) INJECTION at 10:51:00

## 2018-11-28 RX ADMIN — MIDAZOLAM HYDROCHLORIDE 2 MG: 1 INJECTION INTRAMUSCULAR; INTRAVENOUS at 10:26:00

## 2018-11-28 RX ADMIN — KETOROLAC TROMETHAMINE 30 MG: 15 INJECTION, SOLUTION INTRAMUSCULAR; INTRAVENOUS at 11:38:00

## 2018-11-28 RX ADMIN — CEFAZOLIN SODIUM/WATER 2 G: 2 G/20 ML SYRINGE (ML) INTRAVENOUS at 10:44:00

## 2018-11-28 RX ADMIN — ROCURONIUM BROMIDE 30 MG: 10 INJECTION, SOLUTION INTRAVENOUS at 10:30:00

## 2018-11-28 NOTE — PLAN OF CARE
GASTROINTESTINAL - ADULT    • Minimal or absence of nausea and vomiting Progressing    • Maintains or returns to baseline bowel function Progressing        GENITOURINARY - ADULT    • Absence of urinary retention Progressing        Patient Centered Care

## 2018-11-28 NOTE — ANESTHESIA POSTPROCEDURE EVALUATION
Patient: Gabriele Jones    Procedure Summary     Date:  11/28/18 Room / Location:  [facility] OR 03 / [facility] OR    Anesthesia Start:  3955 Anesthesia Stop:  7229    Procedure:  ABDOMINAL HYSTERECTOMY (N/A ) Diagnosis:       Cervical dysplasia

## 2018-11-28 NOTE — ANESTHESIA PREPROCEDURE EVALUATION
Anesthesia PreOp Note    HPI:     Mallorie Miller is a 62year old female who presents for preoperative consultation requested by: Demetria Drummond MD    Date of Surgery: 11/28/2018    Procedure(s):  ABDOMINAL HYSTERECTOMY  Indication: Cervical Family History   Problem Relation Age of Onset   • Cancer Father         thyroid cancer   • Diabetes Mother      Social History    Socioeconomic History      Marital status: Single      Spouse name: Not on file      Number of children: Not on file      [place] Weight: 54.4 kg (120 lb) 50.8 kg (112 lb)   Height: 1.651 m (5' 5\")         Anesthesia ROS/Med Hx and Physical Exam     Patient summary reviewed and Nursing notes reviewed    No history of anesthetic complications   Airway   Mallampati: I  TM distance: >3

## 2018-11-28 NOTE — ANESTHESIA PROCEDURE NOTES
ANESTHESIA INTUBATION  Date/Time: 11/28/2018 10:32 AM  Urgency: elective    Airway not difficult    General Information and Staff    Patient location during procedure: OR  Anesthesiologist: Ezequiel Brooks MD  Resident/CRNA: Christine Adkins CRNA

## 2018-11-28 NOTE — INTERVAL H&P NOTE
Pre-op Diagnosis: Cervical dysplasia [N87.9]    The above referenced H&P was reviewed by Enedelia Horton MD on 11/28/2018, the patient was examined and no significant changes have occurred in the patient's condition since the H&P was performed.   I discus

## 2018-11-28 NOTE — BRIEF OP NOTE
Pre-Operative Diagnosis: Cervical dysplasia [N87.9]     Post-Operative Diagnosis: Cervical dysplasia [N87.9]      Procedure Performed:   Procedure(s):   Total abdominal hysterectomy; prophylactic bilateral salpingectomy    Surgeon(s) and Role:     * Bravo,

## 2018-11-28 NOTE — PLAN OF CARE
Pt is a/o x 3, pain is controlled 2/10. Pt has Sinus Arrhythmia with frequent pac, HR 40-50's. Pt denies any chest pain or shortness of breath. Abdomen soft, nondistended. Scant light pink drainage on peripad.       Dr. Sal Goldman notified (2nd charge), ok

## 2018-11-29 RX ORDER — HYDROCODONE BITARTRATE AND ACETAMINOPHEN 7.5; 325 MG/1; MG/1
1 TABLET ORAL EVERY 6 HOURS PRN
Status: DISCONTINUED | OUTPATIENT
Start: 2018-11-29 | End: 2018-11-30

## 2018-11-29 RX ORDER — ACETAMINOPHEN 325 MG/1
650 TABLET ORAL EVERY 4 HOURS PRN
Status: DISCONTINUED | OUTPATIENT
Start: 2018-11-29 | End: 2018-11-30

## 2018-11-29 RX ORDER — HYDROCODONE BITARTRATE AND ACETAMINOPHEN 7.5; 325 MG/1; MG/1
1 TABLET ORAL EVERY 4 HOURS PRN
Status: DISCONTINUED | OUTPATIENT
Start: 2018-11-29 | End: 2018-11-30

## 2018-11-29 NOTE — PROGRESS NOTES
[facility]    OB/GYNE Progress Note      Joao Farley Patient Status:  Inpatient    1960 MRN [id number]   Location [facility] 4W/SW/SE Attending Jad Paris MD   Hosp Day # 1 PCP Justo Gutierres MD

## 2018-11-29 NOTE — OPERATIVE REPORT
[facility]    PATIENT'S NAME: Rdaha Kellery   ATTENDING PHYSICIAN: Shameka Garner MD   OPERATING PHYSICIAN: Shameka Garner MD   PATIENT ACCOUNT#:   [id number]    LOCATION:  [address] [place] [address] #:   [id number]        needed. A medium-sized self-retaining Jose circular retractor was placed and the Trendelenburg position was assumed. Moist tagged laparotomy sponges retracted the bowel into the upper abdomen cephalad.   Kocher clamps were utilized to clamp the ovarian scissors, and utilizing Aureliano scissors, the uterus and cervix were removed in total with vaginal cuff edge held long. The vaginal cuff angle was then ligated and the vaginal cuff closed with a running locked suture of 0 Vicryl in 2 halves.   The vagina

## 2018-11-30 VITALS
HEART RATE: 59 BPM | BODY MASS INDEX: 18.66 KG/M2 | OXYGEN SATURATION: 95 % | WEIGHT: 112 LBS | RESPIRATION RATE: 18 BRPM | HEIGHT: 65 IN | SYSTOLIC BLOOD PRESSURE: 109 MMHG | DIASTOLIC BLOOD PRESSURE: 60 MMHG | TEMPERATURE: 98 F

## 2018-11-30 PROBLEM — Z01.818 PRE-OP EXAMINATION: Status: RESOLVED | Noted: 2018-11-15 | Resolved: 2018-11-30

## 2018-11-30 RX ORDER — PSEUDOEPHEDRINE HCL 30 MG
100 TABLET ORAL 2 TIMES DAILY
Qty: 40 CAPSULE | Refills: 0 | Status: SHIPPED | OUTPATIENT
Start: 2018-11-30 | End: 2019-08-02

## 2018-11-30 RX ORDER — HYDROCODONE BITARTRATE AND ACETAMINOPHEN 7.5; 325 MG/1; MG/1
1 TABLET ORAL EVERY 6 HOURS PRN
Qty: 12 TABLET | Refills: 0 | Status: SHIPPED | OUTPATIENT
Start: 2018-11-30 | End: 2019-11-19

## 2018-11-30 NOTE — DISCHARGE SUMMARY
[address] Discharge Note      Donel Born Patient Status:  Inpatient    1960 MRN [id number]   Location [facility] 4W/SW/SE Attending Brian Hoskins MD   Hosp Day # 2 PCP Zaynab Knight MD     Ad

## 2018-12-03 ENCOUNTER — NURSE ONLY (OUTPATIENT)
Dept: OBGYN CLINIC | Facility: CLINIC | Age: 58
End: 2018-12-03

## 2018-12-03 VITALS — DIASTOLIC BLOOD PRESSURE: 78 MMHG | SYSTOLIC BLOOD PRESSURE: 122 MMHG | TEMPERATURE: 98 F

## 2018-12-03 DIAGNOSIS — Z48.02: Primary | ICD-10-CM

## 2018-12-03 PROCEDURE — 99024 POSTOP FOLLOW-UP VISIT: CPT | Performed by: OBSTETRICS & GYNECOLOGY

## 2018-12-03 NOTE — PROGRESS NOTES
Pt here for staple removal. Incision well appoximated and intact, no redness, discharge or swelling. Incision area does have moderate amount of bruising. Staples removed, cleaned with normal saline and hydrogen peroxide and steri strips applied.  Pt tolerat

## 2018-12-07 ENCOUNTER — TELEPHONE (OUTPATIENT)
Dept: OBGYN CLINIC | Facility: CLINIC | Age: 58
End: 2018-12-07

## 2018-12-07 NOTE — TELEPHONE ENCOUNTER
Post op pt of LAWSON reported she began bleeding again today after having stopped three days ago and wanted to know if it was normal .  Per pt bleeding is light, but a little more than spotting. Having moderate pelvic pains 3-4/10. No other symptoms.   Not ta

## 2018-12-07 NOTE — TELEPHONE ENCOUNTER
Some intermittent light bleeding can occur. Should notify for any heavy vaginal bleeding. Probably from increased activity. Recommend good rest especially the first two weeks after surgery.

## 2018-12-26 ENCOUNTER — OFFICE VISIT (OUTPATIENT)
Dept: OBGYN CLINIC | Facility: CLINIC | Age: 58
End: 2018-12-26

## 2018-12-26 VITALS
SYSTOLIC BLOOD PRESSURE: 112 MMHG | BODY MASS INDEX: 19.93 KG/M2 | DIASTOLIC BLOOD PRESSURE: 76 MMHG | HEIGHT: 65 IN | WEIGHT: 119.63 LBS

## 2018-12-26 DIAGNOSIS — Z98.890 POSTOPERATIVE STATE: Primary | ICD-10-CM

## 2018-12-26 PROCEDURE — 99024 POSTOP FOLLOW-UP VISIT: CPT | Performed by: OBSTETRICS & GYNECOLOGY

## 2018-12-27 NOTE — PROGRESS NOTES
HPI:    Patient ID: Tameka Hernandez is a 62year old female. HPI  Postop visit  4 weeks status post JACKSON. No complaints. Some incisional soreness especially with bending.   Does not feel ready to return to work and are very physical job as a h

## 2019-08-02 ENCOUNTER — OFFICE VISIT (OUTPATIENT)
Dept: FAMILY MEDICINE CLINIC | Facility: CLINIC | Age: 59
End: 2019-08-02

## 2019-08-02 VITALS
DIASTOLIC BLOOD PRESSURE: 72 MMHG | BODY MASS INDEX: 19.69 KG/M2 | HEIGHT: 65 IN | SYSTOLIC BLOOD PRESSURE: 107 MMHG | WEIGHT: 118.19 LBS | HEART RATE: 61 BPM

## 2019-08-02 DIAGNOSIS — Z00.00 WELL ADULT EXAM: ICD-10-CM

## 2019-08-02 DIAGNOSIS — Z90.710 H/O ABDOMINAL HYSTERECTOMY: ICD-10-CM

## 2019-08-02 DIAGNOSIS — Z80.3 FAMILY HISTORY OF BREAST CANCER IN FIRST DEGREE RELATIVE: ICD-10-CM

## 2019-08-02 DIAGNOSIS — R42 VERTIGO: ICD-10-CM

## 2019-08-02 DIAGNOSIS — J30.1 SEASONAL ALLERGIC RHINITIS DUE TO POLLEN: ICD-10-CM

## 2019-08-02 DIAGNOSIS — R59.0 CERVICAL LYMPHADENOPATHY: ICD-10-CM

## 2019-08-02 DIAGNOSIS — Z12.31 SCREENING MAMMOGRAM, ENCOUNTER FOR: Primary | ICD-10-CM

## 2019-08-02 DIAGNOSIS — E03.9 ACQUIRED HYPOTHYROIDISM: ICD-10-CM

## 2019-08-02 PROCEDURE — 99396 PREV VISIT EST AGE 40-64: CPT | Performed by: NURSE PRACTITIONER

## 2019-08-02 RX ORDER — LEVOTHYROXINE SODIUM 0.05 MG/1
50 TABLET ORAL
Qty: 10 TABLET | Refills: 0 | Status: SHIPPED | OUTPATIENT
Start: 2019-08-02 | End: 2019-08-05

## 2019-08-02 RX ORDER — PREDNISONE 20 MG/1
TABLET ORAL
Qty: 10 TABLET | Refills: 0 | Status: SHIPPED | OUTPATIENT
Start: 2019-08-02 | End: 2019-09-12

## 2019-08-02 NOTE — PATIENT INSTRUCTIONS
Prevention Guidelines, Women Ages 48 to 59  Screening tests and vaccines are an important part of managing your health. A screening test is done to find possible disorders or diseases in people who don't have any symptoms.  The goal is to find a disease e Chlamydia Women at increased risk for infection At routine exams   Colorectal cancer All women in this age group Flexible sigmoidoscopy every 5 years, or colonoscopy every 10 years, or double-contrast barium enema every 5 years; yearly fecal occult blood t Hepatitis B Women at increased risk for infection – talk with your healthcare provider 3 doses over 6 months; second dose should be given 1 month after the first dose; the third dose should be given at least 2 months after the second dose and at least 4 mo Use of daily aspirin Women ages 54 and up in this age group who are at risk for cardiovascular health problems such as stroke When your risk is known   Use of tobacco and the health effects it can cause All women in this age group Every exam   1Amersarita Ca · Decongestant pills and sprays reduce tissue swelling and watery discharge. Overuse of nasal decongestant sprays may make symptoms worse.  Do not use these more often than recommended. Sometimes you can experience a rebound effect (symptoms worsen), when s · You have asthma and your asthma symptoms do not respond to the usual doses of your medicine  · Cough with colored sputum (mucus)  · Fever of 100.4°F (38°C) or higher, or as directed by the healthcare provider  Call 911  Call 911 if any of these occur:  · Dead cells and fluid build up in the lymph nodes as they help fight infection or disease. This causes them to swell in size. Enlarged lymph nodes are often near the source of infection. This can help to find the cause of an infection.  For example, swollen · Lymph node biopsy. If lymph nodes are swollen for 3 to 4 weeks, they may be checked with a biopsy. Small samples of lymph node tissue are taken and checked in a lab for signs of cancer.  You may be referred to a specialist in blood disorders and cancer (h Your doctor may recommend more than one kind of test. The following tests are painless, but may cause dizziness in some cases. · MRI creates images of the ear or head. A magnetic field and contrast medium are used to make the image.   · Electronystagmograp · Ease nausea. Suppositories, pills, or shots can reduce vomiting. · Reduce pressure in the canals. Diuretics can be used to treat Meniere's disease. These medicines help your body get rid of extra fluid. · Ease other symptoms.  Other medicines can help e

## 2019-08-02 NOTE — PROGRESS NOTES
HPI  Pt here for well physical .  Feels dizzy at times-sensation of room spinning. S/s started about 2-3 months ago. Most noticeable when laying down or standing up.     Notices blood coming from right ear one month ago.  -had hysterectomy 11/2018    Gets o hysterectomy N/A 11/28/2018    Procedure: ABDOMINAL HYSTERECTOMY;  Surgeon: Leonor Kolb MD;  Location: [address] [place] [address] MAIN OR       Family History   Problem Relation Age of Onset   • Cancer Father         thyroid cancer   • Diabetes Mother        Social History 0   hydrocodone-acetaminophen 7.5-325 MG Oral Tab Take 1 tablet by mouth every 6 (six) hours as needed.  Disp: 12 tablet Rfl: 0       Allergies:    Milk-Related Compou*    DIARRHEA  Penicillins             DIARRHEA    Comment:Confirmed with patient never ha lymphadenopathy     F/u in 2 weeks for recheck         Relevant Medications    predniSONE 20 MG Oral Tab    Seasonal allergic rhinitis due to pollen     -otc non-drowsy antihistamine (generic claritin, zyrtec or allegra)  -add steroidal nasal spray (flonas

## 2019-08-03 ENCOUNTER — APPOINTMENT (OUTPATIENT)
Dept: LAB | Age: 59
End: 2019-08-03
Attending: NURSE PRACTITIONER
Payer: COMMERCIAL

## 2019-08-03 DIAGNOSIS — E03.9 ACQUIRED HYPOTHYROIDISM: ICD-10-CM

## 2019-08-03 DIAGNOSIS — Z00.00 WELL ADULT EXAM: ICD-10-CM

## 2019-08-03 LAB
ALBUMIN SERPL-MCNC: 3.9 G/DL (ref 3.4–5)
ALBUMIN/GLOB SERPL: 1.3 {RATIO} (ref 1–2)
ALP LIVER SERPL-CCNC: 80 U/L (ref 46–118)
ALT SERPL-CCNC: 21 U/L (ref 13–56)
ANION GAP SERPL CALC-SCNC: 4 MMOL/L (ref 0–18)
AST SERPL-CCNC: 17 U/L (ref 15–37)
BILIRUB SERPL-MCNC: 0.5 MG/DL (ref 0.1–2)
BUN BLD-MCNC: 18 MG/DL (ref 7–18)
BUN/CREAT SERPL: 23.4 (ref 10–20)
CALCIUM BLD-MCNC: 9 MG/DL (ref 8.5–10.1)
CHLORIDE SERPL-SCNC: 111 MMOL/L (ref 98–112)
CHOLEST SMN-MCNC: 205 MG/DL (ref ?–200)
CO2 SERPL-SCNC: 31 MMOL/L (ref 21–32)
CREAT BLD-MCNC: 0.77 MG/DL (ref 0.55–1.02)
DEPRECATED RDW RBC AUTO: 42.5 FL (ref 35.1–46.3)
ERYTHROCYTE [DISTWIDTH] IN BLOOD BY AUTOMATED COUNT: 12 % (ref 11–15)
EST. AVERAGE GLUCOSE BLD GHB EST-MCNC: 114 MG/DL (ref 68–126)
GLOBULIN PLAS-MCNC: 3 G/DL (ref 2.8–4.4)
GLUCOSE BLD-MCNC: 85 MG/DL (ref 70–99)
HBA1C MFR BLD HPLC: 5.6 % (ref ?–5.7)
HCT VFR BLD AUTO: 45.5 % (ref 35–48)
HDLC SERPL-MCNC: 63 MG/DL (ref 40–59)
HGB BLD-MCNC: 15 G/DL (ref 12–16)
LDLC SERPL CALC-MCNC: 117 MG/DL (ref ?–100)
M PROTEIN MFR SERPL ELPH: 6.9 G/DL (ref 6.4–8.2)
MCH RBC QN AUTO: 31.7 PG (ref 26–34)
MCHC RBC AUTO-ENTMCNC: 33 G/DL (ref 31–37)
MCV RBC AUTO: 96.2 FL (ref 80–100)
NONHDLC SERPL-MCNC: 142 MG/DL (ref ?–130)
OSMOLALITY SERPL CALC.SUM OF ELEC: 303 MOSM/KG (ref 275–295)
PATIENT FASTING: YES
PATIENT FASTING: YES
PLATELET # BLD AUTO: 200 10(3)UL (ref 150–450)
POTASSIUM SERPL-SCNC: 4.3 MMOL/L (ref 3.5–5.1)
RBC # BLD AUTO: 4.73 X10(6)UL (ref 3.8–5.3)
SODIUM SERPL-SCNC: 146 MMOL/L (ref 136–145)
TRIGL SERPL-MCNC: 125 MG/DL (ref 30–149)
TSI SER-ACNC: 2.19 MIU/ML (ref 0.36–3.74)
VIT B12 SERPL-MCNC: 545 PG/ML (ref 193–986)
VLDLC SERPL CALC-MCNC: 25 MG/DL (ref 0–30)
WBC # BLD AUTO: 4.8 X10(3) UL (ref 4–11)

## 2019-08-03 PROCEDURE — 83036 HEMOGLOBIN GLYCOSYLATED A1C: CPT

## 2019-08-03 PROCEDURE — 36415 COLL VENOUS BLD VENIPUNCTURE: CPT

## 2019-08-03 PROCEDURE — 80053 COMPREHEN METABOLIC PANEL: CPT

## 2019-08-03 PROCEDURE — 82306 VITAMIN D 25 HYDROXY: CPT

## 2019-08-03 PROCEDURE — 80061 LIPID PANEL: CPT

## 2019-08-03 PROCEDURE — 82607 VITAMIN B-12: CPT

## 2019-08-03 PROCEDURE — 85027 COMPLETE CBC AUTOMATED: CPT

## 2019-08-03 PROCEDURE — 84443 ASSAY THYROID STIM HORMONE: CPT

## 2019-08-03 NOTE — ASSESSMENT & PLAN NOTE
-otc non-drowsy antihistamine (generic claritin, zyrtec or allegra)  -add steroidal nasal spray (flonase, rhinocort -generic works well)  -zaditor eye drops 1 drop each eye twice a day as needed for eye itching  -supportive care discussed  -Please call if

## 2019-08-05 DIAGNOSIS — E03.9 ACQUIRED HYPOTHYROIDISM: Primary | ICD-10-CM

## 2019-08-05 LAB — 25(OH)D3 SERPL-MCNC: 27.8 NG/ML (ref 30–100)

## 2019-08-05 RX ORDER — LEVOTHYROXINE SODIUM 0.05 MG/1
50 TABLET ORAL
Qty: 90 TABLET | Refills: 1 | Status: SHIPPED | OUTPATIENT
Start: 2019-08-05 | End: 2020-03-27 | Stop reason: DRUGHIGH

## 2019-09-12 ENCOUNTER — OFFICE VISIT (OUTPATIENT)
Dept: FAMILY MEDICINE CLINIC | Facility: CLINIC | Age: 59
End: 2019-09-12

## 2019-09-12 VITALS — WEIGHT: 119 LBS | BODY MASS INDEX: 19.83 KG/M2 | HEIGHT: 65 IN

## 2019-09-12 DIAGNOSIS — B86 SCABIES: ICD-10-CM

## 2019-09-12 DIAGNOSIS — L23.9 ALLERGIC DERMATITIS: Primary | ICD-10-CM

## 2019-09-12 PROCEDURE — 99213 OFFICE O/P EST LOW 20 MIN: CPT | Performed by: NURSE PRACTITIONER

## 2019-09-12 RX ORDER — PREDNISONE 20 MG/1
TABLET ORAL
Qty: 10 TABLET | Refills: 0 | Status: SHIPPED | OUTPATIENT
Start: 2019-09-12 | End: 2019-11-19

## 2019-09-12 RX ORDER — HYDROXYZINE HYDROCHLORIDE 25 MG/1
25 TABLET, FILM COATED ORAL NIGHTLY PRN
Qty: 10 TABLET | Refills: 0 | Status: SHIPPED | OUTPATIENT
Start: 2019-09-12 | End: 2019-11-19

## 2019-09-12 RX ORDER — PERMETHRIN 50 MG/G
CREAM TOPICAL
Qty: 60 G | Refills: 1 | Status: SHIPPED | OUTPATIENT
Start: 2019-09-12 | End: 2020-11-12

## 2019-09-12 NOTE — PATIENT INSTRUCTIONS
Scabies  Scabies is a skin infection. It is caused by a tiny parasitic insect, or mite, that is too small to see directly. It can be seen under a microscope, but it is usually recognized only by the rash and symptoms it causes.  This can make it hard to d · Use the cream on your body when your skin is cool and dry. Don’t use it after a hot shower or bath. · Usually the cream is put on your whole body. This means from your chin all the way down to your toes. Scabies does not usually affect an adult’s head. Follow up with your healthcare provider, or as advised. Call your provider if your symptoms don’t improve after 1 week, or if new burrows or rashes appear.   When to seek medical advice  Call your healthcare provider right away if any of these occur:  · Yel · Things that can cause tiny breaks in the skin, such as wood, fiberglass, metal tools, and plant thorns  · Rubber latex in surgical gloves and other medical supplies  Dermatitis can also be caused by the skin being damp for long periods of time.  This can Talk with your healthcare provider about what may have caused your contact dermatitis. Patch testing may help you figure out what caused the rash so you can avoid further contact with it.  Once you learn what caused your rash, make sure to avoid that substa

## 2019-09-17 NOTE — PROGRESS NOTES
HPI  Pt presents for rash all over body for the past couple of days. Has had no known contact with scabies or new detergent, soap, meidcation. Is somewhat itchy. Has had no recent travel. Lives alone.      Review of Systems   Skin: Positive for color c Physical activity:        Days per week: Not on file        Minutes per session: Not on file      Stress: Not on file    Relationships      Social connections:        Talks on phone: Not on file        Gets together: Not on file        Attends [employer] se erythematous papules all over body-yi in creases of body. Psychiatric: She has a normal mood and affect.  Her behavior is normal.       Assessment and Plan:  Problem List Items Addressed This Visit        Immune    Allergic dermatitis - Primary    Relev

## 2019-09-18 DIAGNOSIS — B86 SCABIES: ICD-10-CM

## 2019-09-19 RX ORDER — PERMETHRIN 50 MG/G
CREAM TOPICAL
Qty: 60 G | Refills: 1 | OUTPATIENT
Start: 2019-09-19

## 2019-09-19 NOTE — TELEPHONE ENCOUNTER
I called the pharmacy and  Was filled 9/12/19 and 9/18/19. Refills were used. Per the pharmacist should be gone in 2 doses. LMTCB  Transfer to triage if still has the rash.

## 2019-11-19 ENCOUNTER — OFFICE VISIT (OUTPATIENT)
Dept: FAMILY MEDICINE CLINIC | Facility: CLINIC | Age: 59
End: 2019-11-19

## 2019-11-19 ENCOUNTER — LAB ENCOUNTER (OUTPATIENT)
Dept: LAB | Age: 59
End: 2019-11-19
Attending: NURSE PRACTITIONER
Payer: COMMERCIAL

## 2019-11-19 VITALS
SYSTOLIC BLOOD PRESSURE: 133 MMHG | WEIGHT: 126 LBS | BODY MASS INDEX: 20.99 KG/M2 | HEART RATE: 64 BPM | HEIGHT: 65 IN | DIASTOLIC BLOOD PRESSURE: 84 MMHG

## 2019-11-19 DIAGNOSIS — R14.0 ABDOMINAL BLOATING: ICD-10-CM

## 2019-11-19 DIAGNOSIS — R10.13 EPIGASTRIC PAIN: ICD-10-CM

## 2019-11-19 DIAGNOSIS — R19.7 DIARRHEA, UNSPECIFIED TYPE: ICD-10-CM

## 2019-11-19 DIAGNOSIS — R19.7 DIARRHEA, UNSPECIFIED: Primary | ICD-10-CM

## 2019-11-19 DIAGNOSIS — R19.7 DIARRHEA, UNSPECIFIED TYPE: Primary | ICD-10-CM

## 2019-11-19 PROBLEM — B86 SCABIES: Status: RESOLVED | Noted: 2019-09-12 | Resolved: 2019-11-19

## 2019-11-19 PROCEDURE — 99213 OFFICE O/P EST LOW 20 MIN: CPT | Performed by: NURSE PRACTITIONER

## 2019-11-19 PROCEDURE — 86256 FLUORESCENT ANTIBODY TITER: CPT

## 2019-11-19 PROCEDURE — 83516 IMMUNOASSAY NONANTIBODY: CPT

## 2019-11-19 PROCEDURE — 82150 ASSAY OF AMYLASE: CPT

## 2019-11-19 PROCEDURE — 86003 ALLG SPEC IGE CRUDE XTRC EA: CPT

## 2019-11-19 PROCEDURE — 83690 ASSAY OF LIPASE: CPT

## 2019-11-19 PROCEDURE — 36415 COLL VENOUS BLD VENIPUNCTURE: CPT

## 2019-11-19 PROCEDURE — 82785 ASSAY OF IGE: CPT

## 2019-11-19 PROCEDURE — 83013 H PYLORI (C-13) BREATH: CPT | Performed by: NURSE PRACTITIONER

## 2019-11-19 RX ORDER — OMEPRAZOLE 40 MG/1
40 CAPSULE, DELAYED RELEASE ORAL DAILY
Qty: 90 CAPSULE | Refills: 3 | Status: SHIPPED | OUTPATIENT
Start: 2019-11-19 | End: 2019-12-19

## 2019-11-19 NOTE — ASSESSMENT & PLAN NOTE
Increase omeprazole to 40 mg daily  Discussed   Breath josse  Amylase/lipase  Celiac panel. Food allergy panel.    Refer GI

## 2019-11-19 NOTE — PROGRESS NOTES
HPI  Pt here for epigastric pain, increase burping. 20 years ago had duodenol ulcers and h pylori infection. S/s seem very similarto when first diagnosed. Has a lot of diarrhea yi when eating anything containing milk.  Feels very nauseous when trying to ABDOMINAL HYSTERECTOMY;  Surgeon: Kristopher Yung MD;  Location: [address] [place] [address] MAIN OR       Family History   Problem Relation Age of Onset   • Cancer Father         thyroid cancer   • Diabetes Mother        Social History    Socioeconomic History      Marital sta 1   • Levothyroxine Sodium 50 MCG Oral Tab Take 1 tablet (50 mcg total) by mouth before breakfast. 90 tablet 1       Allergies:    Milk-Related Compou*    DIARRHEA  Penicillins             DIARRHEA    Comment:Confirmed with patient never had true allergy o AMYLASE    LIPASE    ADULT FOOD ALLERGY PROF    ENDOMYSIAL ANTIBODY IGA    GLIADIN (DEAMIDATED) AB (IGG, IGA)    TISSUE TRANSGLUTAMINAS IGA IGG    GASTRO - INTERNAL       Other    Epigastric pain     Increase omeprazole to 40 mg daily  Discussed   [facility]

## 2019-11-19 NOTE — PATIENT INSTRUCTIONS
Epigastric Pain (Uncertain Cause)  Epigastric pain is pain in the upper abdomen. It can be a sign of disease.  Common causes include:  · Acid reflux (stomach acid flowing up into the esophagus)  · Gastritis (irritation of the stomach lining) Most often th · If certain foods seem to cause your pain, try not to eat them. Certain foods can worsen symptoms of gastritis.  Limit or avoid fatty, fried, and spicy foods, as well as coffee, chocolate, mint, and foods with high acid content such as tomatoes and citrus · Inflammatory bowel diseases such as ulcerative colitis, Crohn's disease, and celiac disease  · Food intolerance, such as to lactose, the sugar found in milk and milk products  · Reaction to medicines like antibiotics, laxatives, cancer drugs, and antacid · You may use acetaminophen or NSAID medicines like ibuprofen or naproxen to reduce pain and fever. Don’t use these if you have chronic liver or kidney disease, or ever had a stomach ulcer or gastrointestinal bleeding.  Don't use NSAID medicines if you are · Don’t force yourself to eat, especially if you are having cramping, vomiting, or diarrhea. Don’t eat large amounts at a time, even if you are hungry. · If you eat, avoid fatty, greasy, spicy, or fried foods.   · Don’t eat dairy foods or drink milk if you · Abdominal pain that gets worse  · Constant lower right abdominal pain  · Continued vomiting and inability to keep liquids down  · Diarrhea more than 5 times a day  · Blood in vomit or stool  · Dark urine or no urine for 8 hours, dry mouth and tongue, tir

## 2019-12-02 ENCOUNTER — TELEPHONE (OUTPATIENT)
Dept: FAMILY MEDICINE CLINIC | Facility: CLINIC | Age: 59
End: 2019-12-02

## 2019-12-27 NOTE — H&P
[address] [facility] [address] Gastroenterology                                                                                                  Clinic History and Physical     Pa patient: Unremarkable, 10-year recall -patient will bring in records    Social Hx:  + Current smoker  - No Etoh  - Denies illicit drug use   - LMP: Hysterectomy  - NSAIDs/ASA use: PRN      History, Medications, Allergies, ROS:      Past Medical History: fever  ENDOCRINE:  negative for cold intolerance and heat intolerance  MUSCULOSKELETAL:  negative for joint effusion/severe erythema  BEHAVIOR/PSYCH:  negative for psychotic behavior      PHYSICAL EXAM:   Blood pressure 120/79, pulse 72, height 5' 5\" (1.6 that self resolves. No overt signs of bleeding or new lower GI symptoms. No significant GI family history.   Given that the patient symptoms are very longstanding and if lab work and colonoscopy are unremarkable, symptoms could be food sensitivity, IBS, S

## 2020-01-02 ENCOUNTER — OFFICE VISIT (OUTPATIENT)
Dept: GASTROENTEROLOGY | Facility: CLINIC | Age: 60
End: 2020-01-02

## 2020-01-02 VITALS
BODY MASS INDEX: 21.66 KG/M2 | SYSTOLIC BLOOD PRESSURE: 120 MMHG | HEIGHT: 65 IN | DIASTOLIC BLOOD PRESSURE: 79 MMHG | HEART RATE: 72 BPM | WEIGHT: 130 LBS

## 2020-01-02 DIAGNOSIS — R19.8 IRREGULAR BOWEL HABITS: Primary | ICD-10-CM

## 2020-01-02 DIAGNOSIS — R10.9 ABDOMINAL CRAMPING: ICD-10-CM

## 2020-01-02 DIAGNOSIS — R10.13 DYSPEPSIA: ICD-10-CM

## 2020-01-02 DIAGNOSIS — R10.13 EPIGASTRIC PAIN: ICD-10-CM

## 2020-01-02 PROCEDURE — 99243 OFF/OP CNSLTJ NEW/EST LOW 30: CPT | Performed by: NURSE PRACTITIONER

## 2020-01-02 RX ORDER — OMEPRAZOLE 40 MG/1
CAPSULE, DELAYED RELEASE ORAL
COMMUNITY
Start: 2019-12-22 | End: 2020-11-17

## 2020-03-27 ENCOUNTER — LAB ENCOUNTER (OUTPATIENT)
Dept: LAB | Age: 60
End: 2020-03-27
Attending: NURSE PRACTITIONER
Payer: COMMERCIAL

## 2020-03-27 DIAGNOSIS — E03.9 ACQUIRED HYPOTHYROIDISM: ICD-10-CM

## 2020-03-27 DIAGNOSIS — E03.9 ACQUIRED HYPOTHYROIDISM: Primary | ICD-10-CM

## 2020-03-27 LAB
T4 FREE SERPL-MCNC: 0.9 NG/DL (ref 0.8–1.7)
TSI SER-ACNC: 8.01 MIU/ML (ref 0.36–3.74)

## 2020-03-27 PROCEDURE — 36415 COLL VENOUS BLD VENIPUNCTURE: CPT

## 2020-03-27 PROCEDURE — 84443 ASSAY THYROID STIM HORMONE: CPT

## 2020-03-27 PROCEDURE — 84439 ASSAY OF FREE THYROXINE: CPT

## 2020-03-27 RX ORDER — LEVOTHYROXINE SODIUM 0.07 MG/1
75 TABLET ORAL
Qty: 90 TABLET | Refills: 1 | Status: SHIPPED | OUTPATIENT
Start: 2020-03-27 | End: 2020-10-28

## 2020-10-26 ENCOUNTER — TELEPHONE (OUTPATIENT)
Dept: FAMILY MEDICINE CLINIC | Facility: CLINIC | Age: 60
End: 2020-10-26

## 2020-10-26 DIAGNOSIS — E03.9 ACQUIRED HYPOTHYROIDISM: ICD-10-CM

## 2020-10-28 RX ORDER — LEVOTHYROXINE SODIUM 0.07 MG/1
75 TABLET ORAL
Qty: 30 TABLET | Refills: 0 | Status: SHIPPED | OUTPATIENT
Start: 2020-10-28 | End: 2020-11-05

## 2020-10-28 NOTE — TELEPHONE ENCOUNTER
30 day supply sent in, Arjun Chiu placed an order for her to repeat blood work in June. Overdue on labs, needs follow up blood work for further refills.

## 2020-11-05 RX ORDER — LEVOTHYROXINE SODIUM 0.07 MG/1
75 TABLET ORAL
Qty: 30 TABLET | Refills: 0 | Status: SHIPPED | OUTPATIENT
Start: 2020-11-05 | End: 2020-11-17

## 2020-11-12 ENCOUNTER — OFFICE VISIT (OUTPATIENT)
Dept: FAMILY MEDICINE CLINIC | Facility: CLINIC | Age: 60
End: 2020-11-12

## 2020-11-12 VITALS
BODY MASS INDEX: 21.33 KG/M2 | DIASTOLIC BLOOD PRESSURE: 73 MMHG | HEART RATE: 69 BPM | SYSTOLIC BLOOD PRESSURE: 112 MMHG | HEIGHT: 65 IN | WEIGHT: 128 LBS

## 2020-11-12 DIAGNOSIS — J30.1 SEASONAL ALLERGIC RHINITIS DUE TO POLLEN: ICD-10-CM

## 2020-11-12 DIAGNOSIS — Z00.00 WELL ADULT EXAM: ICD-10-CM

## 2020-11-12 DIAGNOSIS — Z12.31 ENCOUNTER FOR SCREENING MAMMOGRAM FOR MALIGNANT NEOPLASM OF BREAST: ICD-10-CM

## 2020-11-12 DIAGNOSIS — E03.9 ACQUIRED HYPOTHYROIDISM: Primary | ICD-10-CM

## 2020-11-12 DIAGNOSIS — Z90.710 H/O ABDOMINAL HYSTERECTOMY: ICD-10-CM

## 2020-11-12 PROCEDURE — G0438 PPPS, INITIAL VISIT: HCPCS | Performed by: NURSE PRACTITIONER

## 2020-11-12 PROCEDURE — 3074F SYST BP LT 130 MM HG: CPT | Performed by: NURSE PRACTITIONER

## 2020-11-12 PROCEDURE — 99396 PREV VISIT EST AGE 40-64: CPT | Performed by: NURSE PRACTITIONER

## 2020-11-12 PROCEDURE — 3078F DIAST BP <80 MM HG: CPT | Performed by: NURSE PRACTITIONER

## 2020-11-12 PROCEDURE — 3008F BODY MASS INDEX DOCD: CPT | Performed by: NURSE PRACTITIONER

## 2020-11-12 NOTE — PROGRESS NOTES
HPI  Pt presents for annual physical    Is in need of mammogram and colon cancer screening    Having increase in muscle cramping throughout body.      Had flu shot    Has cut down smoking to only a couple a week when out w friends-does not smoke at home  [facility] Surgical History:   Procedure Laterality Date   • Colonoscopy     • Egd     • Total abdominal hysterectomy N/A 11/28/2018    Procedure: ABDOMINAL HYSTERECTOMY;  Surgeon: Leny Greenberg MD;  Location: [facility] MAIN OR       Family History   Problem Relation A mouth every morning before breakfast. 30 tablet 0   • Omeprazole 40 MG Oral Capsule Delayed Release TAKE 1 CAPSULE BY MOUTH ONCE DAILY     • Loperamide HCl (IMODIUM A-D OR) Take by mouth.          Allergies:    Milk-Related Compou*    DIARRHEA  Penicillins allergic rhinitis due to pollen       Other    H/O abdominal hysterectomy    Encounter for screening mammogram for malignant neoplasm of breast    Relevant Orders    [facility] YAS 2D+3D SCREENING BILAT (CPT=77067/45813)    Well adult exam     Screening labs  Pl

## 2020-11-14 ENCOUNTER — LAB ENCOUNTER (OUTPATIENT)
Dept: LAB | Age: 60
End: 2020-11-14
Attending: NURSE PRACTITIONER
Payer: COMMERCIAL

## 2020-11-14 DIAGNOSIS — Z00.00 WELL ADULT EXAM: ICD-10-CM

## 2020-11-14 DIAGNOSIS — E03.9 ACQUIRED HYPOTHYROIDISM: ICD-10-CM

## 2020-11-14 PROCEDURE — 82607 VITAMIN B-12: CPT

## 2020-11-14 PROCEDURE — 80053 COMPREHEN METABOLIC PANEL: CPT

## 2020-11-14 PROCEDURE — 36415 COLL VENOUS BLD VENIPUNCTURE: CPT

## 2020-11-14 PROCEDURE — 85027 COMPLETE CBC AUTOMATED: CPT

## 2020-11-14 PROCEDURE — 80061 LIPID PANEL: CPT

## 2020-11-14 PROCEDURE — 82306 VITAMIN D 25 HYDROXY: CPT

## 2020-11-14 PROCEDURE — 84443 ASSAY THYROID STIM HORMONE: CPT

## 2020-11-17 DIAGNOSIS — E03.9 ACQUIRED HYPOTHYROIDISM: ICD-10-CM

## 2020-11-17 RX ORDER — LEVOTHYROXINE SODIUM 0.07 MG/1
75 TABLET ORAL
Qty: 90 TABLET | Refills: 1 | Status: SHIPPED | OUTPATIENT
Start: 2020-11-17 | End: 2021-06-26

## 2020-11-17 RX ORDER — OMEPRAZOLE 40 MG/1
40 CAPSULE, DELAYED RELEASE ORAL DAILY
Qty: 90 CAPSULE | Refills: 1 | Status: SHIPPED | OUTPATIENT
Start: 2020-11-17

## 2021-04-30 ENCOUNTER — NURSE TRIAGE (OUTPATIENT)
Dept: FAMILY MEDICINE CLINIC | Facility: CLINIC | Age: 61
End: 2021-04-30

## 2021-04-30 ENCOUNTER — OFFICE VISIT (OUTPATIENT)
Dept: INTERNAL MEDICINE CLINIC | Facility: CLINIC | Age: 61
End: 2021-04-30

## 2021-04-30 ENCOUNTER — OFFICE VISIT (OUTPATIENT)
Dept: OTOLARYNGOLOGY | Facility: CLINIC | Age: 61
End: 2021-04-30

## 2021-04-30 VITALS
BODY MASS INDEX: 24.83 KG/M2 | TEMPERATURE: 98 F | DIASTOLIC BLOOD PRESSURE: 70 MMHG | HEIGHT: 65 IN | WEIGHT: 149 LBS | SYSTOLIC BLOOD PRESSURE: 121 MMHG

## 2021-04-30 VITALS
BODY MASS INDEX: 24.83 KG/M2 | TEMPERATURE: 98 F | SYSTOLIC BLOOD PRESSURE: 123 MMHG | HEART RATE: 60 BPM | WEIGHT: 149 LBS | DIASTOLIC BLOOD PRESSURE: 64 MMHG | HEIGHT: 65 IN

## 2021-04-30 DIAGNOSIS — H60.311 ACUTE DIFFUSE OTITIS EXTERNA OF RIGHT EAR: ICD-10-CM

## 2021-04-30 DIAGNOSIS — J32.4 CHRONIC PANSINUSITIS: ICD-10-CM

## 2021-04-30 DIAGNOSIS — H91.93 BILATERAL HEARING LOSS, UNSPECIFIED HEARING LOSS TYPE: ICD-10-CM

## 2021-04-30 DIAGNOSIS — L30.9 DERMATITIS: Primary | ICD-10-CM

## 2021-04-30 DIAGNOSIS — J30.1 SEASONAL ALLERGIC RHINITIS DUE TO POLLEN: ICD-10-CM

## 2021-04-30 DIAGNOSIS — H61.23 BILATERAL IMPACTED CERUMEN: Primary | ICD-10-CM

## 2021-04-30 PROCEDURE — 69210 REMOVE IMPACTED EAR WAX UNI: CPT | Performed by: OTOLARYNGOLOGY

## 2021-04-30 PROCEDURE — 3008F BODY MASS INDEX DOCD: CPT | Performed by: INTERNAL MEDICINE

## 2021-04-30 PROCEDURE — 3078F DIAST BP <80 MM HG: CPT | Performed by: INTERNAL MEDICINE

## 2021-04-30 PROCEDURE — 3074F SYST BP LT 130 MM HG: CPT | Performed by: OTOLARYNGOLOGY

## 2021-04-30 PROCEDURE — 3008F BODY MASS INDEX DOCD: CPT | Performed by: OTOLARYNGOLOGY

## 2021-04-30 PROCEDURE — 3078F DIAST BP <80 MM HG: CPT | Performed by: OTOLARYNGOLOGY

## 2021-04-30 PROCEDURE — 3074F SYST BP LT 130 MM HG: CPT | Performed by: INTERNAL MEDICINE

## 2021-04-30 PROCEDURE — 99243 OFF/OP CNSLTJ NEW/EST LOW 30: CPT | Performed by: OTOLARYNGOLOGY

## 2021-04-30 PROCEDURE — 99204 OFFICE O/P NEW MOD 45 MIN: CPT | Performed by: INTERNAL MEDICINE

## 2021-04-30 RX ORDER — FEXOFENADINE HCL 180 MG/1
180 TABLET ORAL NIGHTLY
Qty: 90 TABLET | Refills: 1 | Status: SHIPPED | OUTPATIENT
Start: 2021-04-30

## 2021-04-30 RX ORDER — FLUTICASONE PROPIONATE 50 MCG
2 SPRAY, SUSPENSION (ML) NASAL DAILY
Qty: 3 BOTTLE | Refills: 3 | Status: SHIPPED | OUTPATIENT
Start: 2021-04-30 | End: 2022-04-25

## 2021-04-30 RX ORDER — NEOMYCIN SULFATE, POLYMYXIN B SULFATE AND HYDROCORTISONE 10; 3.5; 1 MG/ML; MG/ML; [USP'U]/ML
3 SUSPENSION/ DROPS AURICULAR (OTIC) 3 TIMES DAILY
Qty: 10 ML | Refills: 1 | Status: SHIPPED | OUTPATIENT
Start: 2021-04-30 | End: 2021-05-10

## 2021-04-30 RX ORDER — FLUTICASONE PROPIONATE 50 MCG
2 SPRAY, SUSPENSION (ML) NASAL DAILY
Qty: 1 BOTTLE | Refills: 11 | Status: SHIPPED | OUTPATIENT
Start: 2021-04-30 | End: 2021-05-30

## 2021-04-30 RX ORDER — TRIAMCINOLONE ACETONIDE 0.25 MG/G
CREAM TOPICAL
Qty: 15 G | Refills: 0 | Status: SHIPPED | OUTPATIENT
Start: 2021-04-30

## 2021-04-30 NOTE — PATIENT INSTRUCTIONS
Below are two items that might aid in healing and preventing your hands from becoming dry/cracked. Check the ingredients for allergies and test a small amount to ensure no adverse effects.    • Purchase Bag [place] Ointment from the local pharmacy/supermarke sensitive skin. The rash may be caused by extreme heat or heavy sweating. Skin irritants can cause the rash to flare up. These can include wool or silk clothing, grease, oils, some medicines, and harsh soaps and detergents.  Emotional stress can also be a t Flutex, Kenalog, Pediaderm TA, Triacet, Trianex, Triderm  What is this medicine? TRIAMCINOLONE (trye am SIN oh lone) is a corticosteroid. It is used on the skin to reduce swelling, redness, itching, and allergic reactions. How should I use this medicine? should I keep my medicine? Keep out of the reach of children. Store at room temperature between 15 and 30 degrees C (59 and 86 degrees F). Do not freeze. Throw away any unused medicine after the expiration date.   What should I tell my health care provide Or they may occur year round. Common indoor allergens include house dust mites, mold, cockroaches, and pet dander. Outdoor allergens include pollen from trees, grasses, and weeds. Symptoms include a drippy, stuffy, and itchy nose.  They also include sneez care  Follow up as advised by the healthcare provider or our staff. If you were referred to an allergy specialist, make this appointment promptly.   When to seek medical advice  Call your healthcare provider right away if the following occur:  · Coughing or clear, watery discharge  · Stuffy nose (nasal congestion)  · Drainage down your throat (postnasal drip)  · Sneezing  · Red, watery eyes  · Itchy nose, eyes, ears, and throat  · Plugged-up ears (ear congestion)  · Sore throat  · Coughing  · Sinus pain and s such as cats, dogs, birds, horses, and rabbits, are common causes of nasal allergies. Flakes of skin (dander), saliva left on fur when an animal cleans itself, urine in litter boxes and cages, and feathers can all cause nasal allergies.   Irritants make all . Or, use a vacuum designed to lessen allergens. · If someone else can’t dust and vacuum for you, wearing a filter mask may help. Reduce indoor humidity  Dust mites need moist air to live. Use a dehumidifier to reduce air moisture.  Don’t use humid effects, like nasal bleeding  · Should be sprayed into the nose correctly  Make sure you read and follow the instructions on the label.  If you have any questions about these medicines, ask your healthcare provider or pharmacist.    Marleta Dose sores in the nose  · nosebleed  · signs and symptoms of infection like fever or chills; cough; sore throat  · white patches or sores in the mouth or nose  Side effects that usually do not require medical attention (report to your doctor or health care prof infection. Tell your doctor or health care professional if you are around anyone with measles or chickenpox, or if you develop sores or blisters that do not heal properly. NOTE:This sheet is a summary. It may not cover all possible information.  If you hav

## 2021-04-30 NOTE — PROGRESS NOTES
Jennifer Melchor is a 61year old female. Patient presents with:  Hearing Loss: c/o decreased hearing in both ears for  awhile        HISTORY OF PRESENT ILLNESS  4/30/2021   Here for evaluation of chronic ear issues.   She states she has a lot of p Services:       Active Member of Clubs or Organizations:       Attends Club or Organization Meetings:       Marital Status:   Intimate Partner Violence:       Fear of Current or Ex-Partner:       Emotionally Abused:       Physically Abused:       Sexually Normal   Appropriate mood and affect. Eyes Normal Conjunctiva - Right: Normal, Left: Normal. Pupil - Right: Normal, Left: Normal. EOMI.  SUSAN   Ears Normal  normal to inspection ear canals are slightly red irritated ad nl as after cerumen was remov was removed  - OP REFERRAL TO AUDIOLOGY  - REMOVAL IMPACTED CERUMEN REQUIRING INSTRUMENTATION, UNILATERAL    2. Acute diffuse otitis externa of right ear  Pathophysiology of external otitis was discussed at length.  Relevant anatomy was shown to the patient

## 2021-04-30 NOTE — PROGRESS NOTES
History of Present Illness   Patient ID: April Beckett is a 61year old female. Chief Complaint: Rash (1xweek R under eye, itchy.  dust at work effects eye)    Gerald Mc 605    4/30/21 8:51 AM  Note     Action Requested: Summary pain, discharge, redness, itching and visual disturbance. Respiratory: Negative for shortness of breath. Skin: Positive for rash. Allergic/Immunologic: Negative for immunocompromised state. Neurological: Negative for dizziness and headaches. 14 days. DO NOT APPLY IN THE EYE OR EYE LID. Dispense: 15 g; Refill: 0    2. Seasonal allergic rhinitis due to pollen  - Fexofenadine HCl (ALLEGRA ALLERGY) 180 MG Oral Tab; Take 1 tablet (180 mg total) by mouth nightly. Dispense: 90 tablet;  Refill: 1  - Results   Component Value Date    CHOLEST 216 (H) 11/14/2020    TRIG 123 11/14/2020    HDL 65 (H) 11/14/2020     (H) 11/14/2020    VLDL 25 11/14/2020    NONHDLC 151 (H) 11/14/2020     The 10-year ASCVD risk score (Tre Keyes, et al., 2013) is: 6.1% Levothyroxine Sodium (EUTHYROX) 75 MCG Oral Tab Take 1 tablet (75 mcg total) by mouth every morning before breakfast. 90 tablet 1   • Omeprazole 40 MG Oral Capsule Delayed Release Take 1 capsule (40 mg total) by mouth daily.  90 capsule 1   • Loperamide HCl the day/after washing hands. Atopic Dermatitis (Adult)  Atopic dermatitis is a dry, itchy, red rash. It’s also called eczema. The rash is chronic, or ongoing. It can come and go over time. The disease is often passed down in families.  It causes a pr Follow-up care  See your healthcare provider, or as advised. If your symptoms don’t get better or if they get worse in the next 7 days, make an appointment with your healthcare provider.   When to seek medical advice  Call your healthcare provider right jorge skin  · infection  · painful, red, pus filled blisters in hair follicles  · thinning of the skin, sunburn more likely especially on the face  Side effects that usually do not require medical attention (report to your doctor or health care professional if t passes through the skin and increases the risk of side effects. If treating the diaper area of a child, avoid covering the treated area with tight-fitting diapers or plastic pants.  This may increase the amount of medicine that passes through the skin and · If possible, sleep in a room with no carpet, curtains, or upholstered furniture. Cockroaches:  · Store food in sealed containers. · Remove garbage from the home promptly.   · Fix water leaks  Mold:  · Keep humidity low by using a dehumidifier or air con to the antibodies. This makes the mast cells release histamine and other chemicals. This is an allergic reaction. The chemicals irritate nearby nasal tissue. This causes nasal allergy symptoms.   Common nasal allergy symptoms  Allergies can cause nasal tiss wind-blown pollen that causes nasal allergies. The amount of pollen in the air varies from season to season. House-dust mites  House-dust mites are tiny bugs too small to see. They can live in mattresses, blankets, stuffed toys, carpets, and curtains.  The shades or vertical blinds. · Use washable curtains instead of heavy drapes. · Have as little carpeting as possible. · Cover your mattress, box spring, and pillows in allergy-proof casings.   Housecleaning  Here are some tips:  · Wash sheets, blankets, an sprays. · May cause you to be sleepy  · Should be taken as instructed  Decongestants  Decongestants reduce swelling of the nasal passages. They relieve pressure and pain in your sinuses.   Decongestants:  · Are available as pills, liquids, and nasal sprays in children. Special care may be needed. Some products have been used for allergies in children as young as 2 years. After 2 months of daily use without a prescription in a child, talk to your pediatrician before using it for a longer time.  Use of this med recent surgery on nose or sinuses  · taking a corticosteroid by mouth  · an unusual or allergic reaction to fluticasone, steroids, other medicines, foods, dyes, or preservatives  · pregnant or trying to get pregnant  · breast-feeding  What should I watch f

## 2021-04-30 NOTE — TELEPHONE ENCOUNTER
Action Requested: Summary for Provider     []  Critical Lab, Recommendations Needed  [] Need Additional Advice  []   FYI    []   Need Orders  [] Need Medications Sent to Pharmacy  []  Other     SUMMARY: Patient requesting appt in [facility] office for rash under

## 2021-06-25 DIAGNOSIS — E03.9 ACQUIRED HYPOTHYROIDISM: ICD-10-CM

## 2021-06-25 NOTE — TELEPHONE ENCOUNTER
Current Outpatient Medications:   •  •  Levothyroxine Sodium (EUTHYROX) 75 MCG Oral Tab, Take 1 tablet (75 mcg total) by mouth every morning before breakfast., Disp: 90 tablet, Rfl: 1  •

## 2021-06-26 RX ORDER — LEVOTHYROXINE SODIUM 0.07 MG/1
75 TABLET ORAL
Qty: 90 TABLET | Refills: 1 | Status: SHIPPED | OUTPATIENT
Start: 2021-06-26

## 2021-08-31 ENCOUNTER — OFFICE VISIT (OUTPATIENT)
Dept: FAMILY MEDICINE CLINIC | Facility: CLINIC | Age: 61
End: 2021-08-31

## 2021-08-31 VITALS
BODY MASS INDEX: 21.33 KG/M2 | SYSTOLIC BLOOD PRESSURE: 123 MMHG | DIASTOLIC BLOOD PRESSURE: 69 MMHG | HEART RATE: 65 BPM | TEMPERATURE: 99 F | WEIGHT: 128 LBS | HEIGHT: 65 IN

## 2021-08-31 DIAGNOSIS — R35.0 URINARY FREQUENCY: Primary | ICD-10-CM

## 2021-08-31 DIAGNOSIS — R10.2 PELVIC PAIN: ICD-10-CM

## 2021-08-31 DIAGNOSIS — R30.0 DYSURIA: ICD-10-CM

## 2021-08-31 LAB
APPEARANCE: CLEAR
BILIRUBIN: NEGATIVE
GLUCOSE (URINE DIPSTICK): NEGATIVE MG/DL
KETONES (URINE DIPSTICK): NEGATIVE MG/DL
MULTISTIX LOT#: ABNORMAL NUMERIC
NITRITE, URINE: NEGATIVE
PH, URINE: 5.5 (ref 4.5–8)
PROTEIN (URINE DIPSTICK): NEGATIVE MG/DL
SPECIFIC GRAVITY: 1.03 (ref 1–1.03)
URINE-COLOR: YELLOW
UROBILINOGEN,SEMI-QN: 0.2 MG/DL (ref 0–1.9)

## 2021-08-31 PROCEDURE — 3008F BODY MASS INDEX DOCD: CPT | Performed by: NURSE PRACTITIONER

## 2021-08-31 PROCEDURE — 81003 URINALYSIS AUTO W/O SCOPE: CPT | Performed by: NURSE PRACTITIONER

## 2021-08-31 PROCEDURE — 99214 OFFICE O/P EST MOD 30 MIN: CPT | Performed by: NURSE PRACTITIONER

## 2021-08-31 PROCEDURE — 3078F DIAST BP <80 MM HG: CPT | Performed by: NURSE PRACTITIONER

## 2021-08-31 PROCEDURE — 3074F SYST BP LT 130 MM HG: CPT | Performed by: NURSE PRACTITIONER

## 2021-08-31 RX ORDER — NITROFURANTOIN 25; 75 MG/1; MG/1
100 CAPSULE ORAL 2 TIMES DAILY
Qty: 14 CAPSULE | Refills: 0 | Status: SHIPPED | OUTPATIENT
Start: 2021-08-31

## 2021-08-31 NOTE — PROGRESS NOTES
HPI    Patient presents for urinary frequency and pelvic pain that presented last night. Was up all night, couldn't sleep. With some pain when urinating. Review of Systems   Genitourinary: Positive for dysuria, frequency and pelvic pain.    All other Concerns:        Not on file    Social History Narrative      Not on file    Social Determinants of Health  Financial Resource Strain:       Difficulty of Paying Living Expenses:   Food Insecurity:       Worried About [address] in the Last Year: Allergies:    Milk-Related Compou*    DIARRHEA  Penicillins             DIARRHEA    Comment:Confirmed with patient never had true allergy only             stomach upset and diarrhea    Physical Exam  Vitals and nursing note reviewed.    Constitutional concerns. Encouraged to sign up for My Chart if not already registered.

## 2021-12-28 ENCOUNTER — LAB ENCOUNTER (OUTPATIENT)
Dept: LAB | Age: 61
End: 2021-12-28
Attending: NURSE PRACTITIONER
Payer: COMMERCIAL

## 2021-12-28 ENCOUNTER — OFFICE VISIT (OUTPATIENT)
Dept: FAMILY MEDICINE CLINIC | Facility: CLINIC | Age: 61
End: 2021-12-28

## 2021-12-28 VITALS
HEIGHT: 65 IN | WEIGHT: 128 LBS | BODY MASS INDEX: 21.33 KG/M2 | SYSTOLIC BLOOD PRESSURE: 133 MMHG | DIASTOLIC BLOOD PRESSURE: 79 MMHG | HEART RATE: 55 BPM

## 2021-12-28 DIAGNOSIS — E06.3 HYPOTHYROIDISM DUE TO HASHIMOTO'S THYROIDITIS: Primary | ICD-10-CM

## 2021-12-28 DIAGNOSIS — Z12.31 ENCOUNTER FOR SCREENING MAMMOGRAM FOR MALIGNANT NEOPLASM OF BREAST: ICD-10-CM

## 2021-12-28 DIAGNOSIS — R10.13 EPIGASTRIC PAIN: ICD-10-CM

## 2021-12-28 DIAGNOSIS — R19.7 DIARRHEA, UNSPECIFIED TYPE: ICD-10-CM

## 2021-12-28 DIAGNOSIS — Z23 IMMUNIZATION DUE: ICD-10-CM

## 2021-12-28 DIAGNOSIS — E03.8 HYPOTHYROIDISM DUE TO HASHIMOTO'S THYROIDITIS: Primary | ICD-10-CM

## 2021-12-28 DIAGNOSIS — Z00.00 WELL ADULT EXAM: ICD-10-CM

## 2021-12-28 DIAGNOSIS — Z90.710 H/O ABDOMINAL HYSTERECTOMY: ICD-10-CM

## 2021-12-28 PROBLEM — R59.0 CERVICAL LYMPHADENOPATHY: Status: RESOLVED | Noted: 2019-08-02 | Resolved: 2021-12-28

## 2021-12-28 PROBLEM — K90.49 GASTROINTESTINAL INTOLERANCE TO FOODS: Status: ACTIVE | Noted: 2021-12-28

## 2021-12-28 PROBLEM — R42 VERTIGO: Status: RESOLVED | Noted: 2019-08-02 | Resolved: 2021-12-28

## 2021-12-28 LAB
ALBUMIN SERPL-MCNC: 3.8 G/DL (ref 3.4–5)
ALBUMIN/GLOB SERPL: 1.3 {RATIO} (ref 1–2)
ALP LIVER SERPL-CCNC: 97 U/L
ALT SERPL-CCNC: 28 U/L
AMYLASE SERPL-CCNC: 56 U/L (ref 25–115)
ANION GAP SERPL CALC-SCNC: 5 MMOL/L (ref 0–18)
AST SERPL-CCNC: 15 U/L (ref 15–37)
BILIRUB SERPL-MCNC: 0.3 MG/DL (ref 0.1–2)
BUN BLD-MCNC: 15 MG/DL (ref 7–18)
BUN/CREAT SERPL: 20 (ref 10–20)
CALCIUM BLD-MCNC: 8.8 MG/DL (ref 8.5–10.1)
CHLORIDE SERPL-SCNC: 108 MMOL/L (ref 98–112)
CHOLEST SERPL-MCNC: 229 MG/DL (ref ?–200)
CO2 SERPL-SCNC: 32 MMOL/L (ref 21–32)
CREAT BLD-MCNC: 0.75 MG/DL
DEPRECATED RDW RBC AUTO: 39.8 FL (ref 35.1–46.3)
ERYTHROCYTE [DISTWIDTH] IN BLOOD BY AUTOMATED COUNT: 11.7 % (ref 11–15)
EST. AVERAGE GLUCOSE BLD GHB EST-MCNC: 120 MG/DL (ref 68–126)
FASTING PATIENT LIPID ANSWER: NO
FASTING STATUS PATIENT QL REPORTED: NO
GLOBULIN PLAS-MCNC: 2.9 G/DL (ref 2.8–4.4)
GLUCOSE BLD-MCNC: 74 MG/DL (ref 70–99)
HBA1C MFR BLD: 5.8 % (ref ?–5.7)
HCT VFR BLD AUTO: 42.8 %
HDLC SERPL-MCNC: 49 MG/DL (ref 40–59)
HGB BLD-MCNC: 14.2 G/DL
LDLC SERPL CALC-MCNC: 118 MG/DL (ref ?–100)
LIPASE SERPL-CCNC: 137 U/L (ref 73–393)
MCH RBC QN AUTO: 30.7 PG (ref 26–34)
MCHC RBC AUTO-ENTMCNC: 33.2 G/DL (ref 31–37)
MCV RBC AUTO: 92.4 FL
NONHDLC SERPL-MCNC: 180 MG/DL (ref ?–130)
OSMOLALITY SERPL CALC.SUM OF ELEC: 299 MOSM/KG (ref 275–295)
PLATELET # BLD AUTO: 217 10(3)UL (ref 150–450)
POTASSIUM SERPL-SCNC: 4.1 MMOL/L (ref 3.5–5.1)
PROT SERPL-MCNC: 6.7 G/DL (ref 6.4–8.2)
RBC # BLD AUTO: 4.63 X10(6)UL
SODIUM SERPL-SCNC: 145 MMOL/L (ref 136–145)
TRIGL SERPL-MCNC: 359 MG/DL (ref 30–149)
TSI SER-ACNC: 2.32 MIU/ML (ref 0.36–3.74)
VIT B12 SERPL-MCNC: 688 PG/ML (ref 193–986)
VIT D+METAB SERPL-MCNC: 33.8 NG/ML (ref 30–100)
VLDLC SERPL CALC-MCNC: 64 MG/DL (ref 0–30)
WBC # BLD AUTO: 5.6 X10(3) UL (ref 4–11)

## 2021-12-28 PROCEDURE — 82150 ASSAY OF AMYLASE: CPT

## 2021-12-28 PROCEDURE — 90750 HZV VACC RECOMBINANT IM: CPT | Performed by: NURSE PRACTITIONER

## 2021-12-28 PROCEDURE — 83013 H PYLORI (C-13) BREATH: CPT | Performed by: NURSE PRACTITIONER

## 2021-12-28 PROCEDURE — 85027 COMPLETE CBC AUTOMATED: CPT

## 2021-12-28 PROCEDURE — 83036 HEMOGLOBIN GLYCOSYLATED A1C: CPT

## 2021-12-28 PROCEDURE — 82607 VITAMIN B-12: CPT

## 2021-12-28 PROCEDURE — 90471 IMMUNIZATION ADMIN: CPT | Performed by: NURSE PRACTITIONER

## 2021-12-28 PROCEDURE — 99396 PREV VISIT EST AGE 40-64: CPT | Performed by: NURSE PRACTITIONER

## 2021-12-28 PROCEDURE — 84443 ASSAY THYROID STIM HORMONE: CPT

## 2021-12-28 PROCEDURE — 36415 COLL VENOUS BLD VENIPUNCTURE: CPT

## 2021-12-28 PROCEDURE — 3008F BODY MASS INDEX DOCD: CPT | Performed by: NURSE PRACTITIONER

## 2021-12-28 PROCEDURE — 3078F DIAST BP <80 MM HG: CPT | Performed by: NURSE PRACTITIONER

## 2021-12-28 PROCEDURE — 99213 OFFICE O/P EST LOW 20 MIN: CPT | Performed by: NURSE PRACTITIONER

## 2021-12-28 PROCEDURE — G0439 PPPS, SUBSEQ VISIT: HCPCS | Performed by: NURSE PRACTITIONER

## 2021-12-28 PROCEDURE — 80061 LIPID PANEL: CPT

## 2021-12-28 PROCEDURE — 80053 COMPREHEN METABOLIC PANEL: CPT

## 2021-12-28 PROCEDURE — 3075F SYST BP GE 130 - 139MM HG: CPT | Performed by: NURSE PRACTITIONER

## 2021-12-28 PROCEDURE — 83690 ASSAY OF LIPASE: CPT

## 2021-12-28 PROCEDURE — 82306 VITAMIN D 25 HYDROXY: CPT

## 2021-12-28 NOTE — H&P
[address] [facility] [place] - Gastroenterology                                                                                                               Reason for consult: diarrhea    Requesting physician or provider: Gallo Turcios history of adverse reaction to sedation  No MATI  No anticoagulants  No pacemaker/defibrillator  No pain medications and/or sleep aides    Last colonoscopy: 5 years ago and was normal  Cln/egd 15 years ago. Had h.pylori that was treated.   Does not recall er Propionate 50 MCG/ACT Nasal Suspension 2 sprays by Each Nare route daily. 3 Bottle 3   • triamcinolone acetonide 0.025 % External Cream Apply thin layer 2 times daily as needed to affected areas for up to 14 days. DO NOT APPLY IN THE EYE OR EYE LID.  15 g 0 thyroid, visual impairment:    #diarrhea  #n/v  #abd cramping  She is here today for eval of her post-prandial watery diarrhea, n/v. Diarrhea chronic and intermittent. N/v new onset and progressively worse. No clear antecedent to symptoms.  She denies brbpr 3350-KCl-Na Bicarb-NaCl (TRILYTE) 420 g Oral Recon Soln 4000 mL 0     Sig: Take prep as directed by gastro office. May substitute with Trilyte/generic equivalent if needed.        Imaging & Referrals:  None    ENDOSCOPIC RISK BENEFIT DISCUSSION: I described

## 2021-12-28 NOTE — ASSESSMENT & PLAN NOTE
Screening labs  Mammogram  Refer GI-needs eval and prob endo and colonoscopy  Encourage second J&J covid vaccine  shingrix today  Mammogram ordered

## 2021-12-28 NOTE — PROGRESS NOTES
HPI  Pt presents for annual physical.     Had hysterectomy due to abnormal cells-did not follow up for 6 month ob gyne visit. Sx was done 2 years ago. Has not had mammogram     Had tsh checked 10/6 and it was 5.760  + thyroid ab.      Would like to be t file      Highest education level: Not on file    Occupational History      Not on file    Tobacco Use      Smoking status: Former Smoker        Types: Cigarettes      Smokeless tobacco: Never Used      Tobacco comment: Social smoking    Vaping Use      Va Exam  Vitals and nursing note reviewed. Constitutional:       General: She is not in acute distress. Appearance: Normal appearance. She is normal weight. She is not ill-appearing. HENT:      Head: Normocephalic.    Cardiovascular:      Rate and Rhyt COMP METABOLIC PANEL (14)    HEMOGLOBIN A1C    LIPID PANEL    TSH W REFLEX TO FREE T4    VITAMIN B12    VITAMIN D, 25-HYDROXY      Other Visit Diagnoses     Immunization due        Relevant Orders    ZOSTER VACC RECOMBINANT IM NJX (Completed)

## 2021-12-29 ENCOUNTER — TELEPHONE (OUTPATIENT)
Dept: GASTROENTEROLOGY | Facility: CLINIC | Age: 61
End: 2021-12-29

## 2021-12-29 ENCOUNTER — OFFICE VISIT (OUTPATIENT)
Dept: GASTROENTEROLOGY | Facility: CLINIC | Age: 61
End: 2021-12-29

## 2021-12-29 ENCOUNTER — LAB ENCOUNTER (OUTPATIENT)
Dept: LAB | Facility: HOSPITAL | Age: 61
End: 2021-12-29
Attending: NURSE PRACTITIONER
Payer: COMMERCIAL

## 2021-12-29 VITALS
SYSTOLIC BLOOD PRESSURE: 124 MMHG | HEART RATE: 73 BPM | RESPIRATION RATE: 16 BRPM | HEIGHT: 65 IN | DIASTOLIC BLOOD PRESSURE: 75 MMHG | BODY MASS INDEX: 21.99 KG/M2 | WEIGHT: 132 LBS

## 2021-12-29 DIAGNOSIS — R10.9 ABDOMINAL CRAMPING: ICD-10-CM

## 2021-12-29 DIAGNOSIS — R10.9 ABDOMINAL PAIN, UNSPECIFIED ABDOMINAL LOCATION: ICD-10-CM

## 2021-12-29 DIAGNOSIS — R11.2 NAUSEA AND VOMITING, UNSPECIFIED VOMITING TYPE: Primary | ICD-10-CM

## 2021-12-29 DIAGNOSIS — R19.7 WATERY DIARRHEA: ICD-10-CM

## 2021-12-29 DIAGNOSIS — R19.7 DIARRHEA, UNSPECIFIED TYPE: ICD-10-CM

## 2021-12-29 PROCEDURE — 3074F SYST BP LT 130 MM HG: CPT | Performed by: NURSE PRACTITIONER

## 2021-12-29 PROCEDURE — 99244 OFF/OP CNSLTJ NEW/EST MOD 40: CPT | Performed by: NURSE PRACTITIONER

## 2021-12-29 PROCEDURE — 3008F BODY MASS INDEX DOCD: CPT | Performed by: NURSE PRACTITIONER

## 2021-12-29 PROCEDURE — 3078F DIAST BP <80 MM HG: CPT | Performed by: NURSE PRACTITIONER

## 2021-12-29 RX ORDER — POLYETHYLENE GLYCOL 3350, SODIUM CHLORIDE, SODIUM BICARBONATE, POTASSIUM CHLORIDE 420; 11.2; 5.72; 1.48 G/4L; G/4L; G/4L; G/4L
POWDER, FOR SOLUTION ORAL
Qty: 4000 ML | Refills: 0 | Status: SHIPPED | OUTPATIENT
Start: 2021-12-29

## 2021-12-29 NOTE — PATIENT INSTRUCTIONS
-avoid dairy, wheat, nsaids  -imodium as needed  -start omeprazole prescription  -reflux diet/bland diet  -stool testing  -er if condition decline    1. Schedule colonoscopy/egd with MAC w/ first avail [Diagnosis: diarrhea, n/v, abd cramping]    2.  Licha Rogers content on 6/1/2019  © 1369-8569 [employer]. [address] [facility] [place], [address] [place]. All rights reserved. This information is not intended as a substitute for professional medical care.  Always follow your healthcare professional's instruc

## 2021-12-29 NOTE — TELEPHONE ENCOUNTER
Scheduled for:  Colonoscopy [address]1830 EGD [address]004  Provider Name:  Dr Davide Berumen  Date:  Tuesday, 01/18/2022  Location:  [facility]  Sedation:  MAC  Time:  3:30pm ((pt is aware that [facility] will call the day before to confirm arrival time)  Prep:  Trilyte  Meds/Allergies Recon

## 2022-01-01 ENCOUNTER — LAB ENCOUNTER (OUTPATIENT)
Dept: LAB | Facility: HOSPITAL | Age: 62
End: 2022-01-01
Attending: NURSE PRACTITIONER
Payer: COMMERCIAL

## 2022-01-01 DIAGNOSIS — R11.2 NAUSEA AND VOMITING, UNSPECIFIED VOMITING TYPE: ICD-10-CM

## 2022-01-01 DIAGNOSIS — E78.2 MIXED HYPERLIPIDEMIA: Primary | ICD-10-CM

## 2022-01-01 DIAGNOSIS — R19.7 WATERY DIARRHEA: ICD-10-CM

## 2022-01-01 PROCEDURE — 87493 C DIFF AMPLIFIED PROBE: CPT

## 2022-01-01 PROCEDURE — 87046 STOOL CULTR AEROBIC BACT EA: CPT

## 2022-01-01 PROCEDURE — 87427 SHIGA-LIKE TOXIN AG IA: CPT

## 2022-01-01 PROCEDURE — 87272 CRYPTOSPORIDIUM AG IF: CPT

## 2022-01-01 PROCEDURE — 87045 FECES CULTURE AEROBIC BACT: CPT

## 2022-01-01 PROCEDURE — 87329 GIARDIA AG IA: CPT

## 2022-01-03 LAB
C DIFF TOX B STL QL: NEGATIVE
CRYPTOSP AG STL QL IA: NEGATIVE
G LAMBLIA AG STL QL IA: NEGATIVE

## 2022-01-10 ENCOUNTER — HOSPITAL ENCOUNTER (OUTPATIENT)
Dept: MAMMOGRAPHY | Age: 62
Discharge: HOME | End: 2022-01-10
Attending: NURSE PRACTITIONER
Payer: COMMERCIAL

## 2022-01-10 DIAGNOSIS — Z12.31 ENCOUNTER FOR SCREENING MAMMOGRAM FOR MALIGNANT NEOPLASM OF BREAST: ICD-10-CM

## 2022-01-10 PROCEDURE — 77067 SCR MAMMO BI INCL CAD: CPT | Performed by: NURSE PRACTITIONER

## 2022-01-10 PROCEDURE — 77063 BREAST TOMOSYNTHESIS BI: CPT | Performed by: NURSE PRACTITIONER

## 2022-01-15 ENCOUNTER — LAB REQUISITION (OUTPATIENT)
Dept: SURGERY | Age: 62
End: 2022-01-15
Payer: COMMERCIAL

## 2022-01-15 DIAGNOSIS — Z01.818 PREOP EXAMINATION: ICD-10-CM

## 2022-01-16 LAB — SARS-COV-2 RNA RESP QL NAA+PROBE: NOT DETECTED

## 2022-01-18 ENCOUNTER — LAB REQUISITION (OUTPATIENT)
Dept: SURGERY | Age: 62
End: 2022-01-18
Payer: COMMERCIAL

## 2022-01-18 ENCOUNTER — SURGERY CENTER DOCUMENTATION (OUTPATIENT)
Dept: SURGERY | Age: 62
End: 2022-01-18

## 2022-01-18 DIAGNOSIS — R11.2 NAUSEA AND VOMITING, UNSPECIFIED VOMITING TYPE: ICD-10-CM

## 2022-01-18 DIAGNOSIS — R10.9 ABDOMINAL PAIN, UNSPECIFIED ABDOMINAL LOCATION: ICD-10-CM

## 2022-01-18 DIAGNOSIS — R19.7 DIARRHEA, UNSPECIFIED TYPE: ICD-10-CM

## 2022-01-18 PROCEDURE — 88305 TISSUE EXAM BY PATHOLOGIST: CPT | Performed by: INTERNAL MEDICINE

## 2022-01-18 PROCEDURE — 88313 SPECIAL STAINS GROUP 2: CPT | Performed by: INTERNAL MEDICINE

## 2022-01-18 PROCEDURE — 43239 EGD BIOPSY SINGLE/MULTIPLE: CPT | Performed by: INTERNAL MEDICINE

## 2022-01-18 PROCEDURE — 88312 SPECIAL STAINS GROUP 1: CPT | Performed by: INTERNAL MEDICINE

## 2022-01-18 PROCEDURE — 45380 COLONOSCOPY AND BIOPSY: CPT | Performed by: INTERNAL MEDICINE

## 2022-01-18 NOTE — PROCEDURES
ESOPHAGOGASTRODUODENOSCOPY (EGD) & COLONOSCOPY REPORT    Prince Miller     1960 Age 64year old   PCP Alfonso Dimas MD Endoscopist Graciela Galindo MD     Date of procedure: 22    Procedure: EGD w/biopsies & Colonoscopy w/biopsi carefully withdrew the instrument from the patient who tolerated the procedure well. Complications: None    EGD findings:      1. Esophagus: The squamocolumnar junction was noted at 38 cm and appeared regular.  The GE junction was noted at 38 cm from th

## 2022-01-27 ENCOUNTER — HOSPITAL ENCOUNTER (OUTPATIENT)
Dept: ULTRASOUND IMAGING | Facility: HOSPITAL | Age: 62
Discharge: HOME | End: 2022-01-27
Attending: NURSE PRACTITIONER
Payer: COMMERCIAL

## 2022-01-27 ENCOUNTER — HOSPITAL ENCOUNTER (OUTPATIENT)
Dept: MAMMOGRAPHY | Facility: HOSPITAL | Age: 62
Discharge: HOME | End: 2022-01-27
Attending: NURSE PRACTITIONER
Payer: COMMERCIAL

## 2022-01-27 DIAGNOSIS — R92.8 ABNORMAL MAMMOGRAM: ICD-10-CM

## 2022-01-27 PROCEDURE — 77061 BREAST TOMOSYNTHESIS UNI: CPT | Performed by: NURSE PRACTITIONER

## 2022-01-27 PROCEDURE — 77065 DX MAMMO INCL CAD UNI: CPT | Performed by: NURSE PRACTITIONER

## 2022-01-27 PROCEDURE — 76641 ULTRASOUND BREAST COMPLETE: CPT | Performed by: NURSE PRACTITIONER

## 2022-01-27 NOTE — IMAGING NOTE
This nurse introduced self and role of breast coordinator. Discussed recommended breast biopsy with patient. Pt was recommended by Dr. Laquita Salcido to have a right  breast ultrasound guided biopsy. She is . She has 3 sons 1 dtr.   She has a super or recommended.       3.  The previously noted focal asymmetry in the right breast lower inner quadrant effaces on spot compression views and has no corresponding sonographic correlate.  This presumably represented summation artifact.       Findings and recomm Take 1 tablet (180 mg total) by mouth nightly. 90 tablet 1   • Fluticasone Propionate 50 MCG/ACT Nasal Suspension 2 sprays by Each Nare route daily.  3 Bottle 3   • triamcinolone acetonide 0.025 % External Cream Apply thin layer 2 times daily as needed to a placed so this biopsy site is able to be accurately located upon future breast imaging. After the clip is placed, steri strips will be applied to  the biopsy site and should be kept on for 5 days.   Additional mammography films will then be taken to assure

## 2022-02-04 ENCOUNTER — HOSPITAL ENCOUNTER (OUTPATIENT)
Dept: ULTRASOUND IMAGING | Facility: HOSPITAL | Age: 62
Discharge: HOME | End: 2022-02-04
Attending: NURSE PRACTITIONER
Payer: COMMERCIAL

## 2022-02-04 ENCOUNTER — HOSPITAL ENCOUNTER (OUTPATIENT)
Dept: MAMMOGRAPHY | Facility: HOSPITAL | Age: 62
Discharge: HOME | End: 2022-02-04
Attending: NURSE PRACTITIONER
Payer: COMMERCIAL

## 2022-02-04 DIAGNOSIS — N63.10 BREAST MASS, RIGHT: ICD-10-CM

## 2022-02-04 PROCEDURE — 77065 DX MAMMO INCL CAD UNI: CPT | Performed by: NURSE PRACTITIONER

## 2022-02-04 PROCEDURE — 19083 BX BREAST 1ST LESION US IMAG: CPT | Performed by: NURSE PRACTITIONER

## 2022-02-04 PROCEDURE — 88305 TISSUE EXAM BY PATHOLOGIST: CPT | Performed by: NURSE PRACTITIONER

## 2022-02-04 NOTE — PROCEDURES
[facility]  Procedure Note    Diana Bear Patient Status:  Outpatient    1960 MRN [id number]   Location [address] Attending Lisa Lackey, APRN   Hosp Day # 0 PCP Amanda Cerda MD     Procedure: Right breast ultrasound guided biopsy    Pre-Procedure Diagnosis:  Right breast mass    Post-Procedure Diagnosis:  Right breast mass    Anesthesia:  Local    Findings:   Right breast mass    Specimens: multiple cores    Blood Loss:  minimal    Tourniquet Time: none  Complications:  None  Drains:  none    Dionne Hernandez MD  2022

## 2022-02-07 ENCOUNTER — TELEPHONE (OUTPATIENT)
Dept: MAMMOGRAPHY | Facility: HOSPITAL | Age: 62
End: 2022-02-07

## 2022-02-24 ENCOUNTER — LAB ENCOUNTER (OUTPATIENT)
Dept: LAB | Age: 62
End: 2022-02-24
Attending: NURSE PRACTITIONER
Payer: COMMERCIAL

## 2022-02-24 ENCOUNTER — OFFICE VISIT (OUTPATIENT)
Dept: FAMILY MEDICINE CLINIC | Facility: CLINIC | Age: 62
End: 2022-02-24
Payer: COMMERCIAL

## 2022-02-24 VITALS
WEIGHT: 133 LBS | BODY MASS INDEX: 22.16 KG/M2 | DIASTOLIC BLOOD PRESSURE: 77 MMHG | SYSTOLIC BLOOD PRESSURE: 112 MMHG | HEART RATE: 78 BPM | HEIGHT: 65 IN

## 2022-02-24 DIAGNOSIS — E03.9 ACQUIRED HYPOTHYROIDISM: ICD-10-CM

## 2022-02-24 DIAGNOSIS — E78.2 MIXED HYPERLIPIDEMIA: Primary | ICD-10-CM

## 2022-02-24 DIAGNOSIS — E03.8 HYPOTHYROIDISM DUE TO HASHIMOTO'S THYROIDITIS: ICD-10-CM

## 2022-02-24 DIAGNOSIS — Z23 IMMUNIZATION DUE: ICD-10-CM

## 2022-02-24 DIAGNOSIS — E06.3 HYPOTHYROIDISM DUE TO HASHIMOTO'S THYROIDITIS: ICD-10-CM

## 2022-02-24 DIAGNOSIS — D24.1 BREAST FIBROADENOMA IN FEMALE, RIGHT: ICD-10-CM

## 2022-02-24 DIAGNOSIS — R19.7 DIARRHEA, UNSPECIFIED TYPE: ICD-10-CM

## 2022-02-24 DIAGNOSIS — E78.2 MIXED HYPERLIPIDEMIA: ICD-10-CM

## 2022-02-24 LAB
CHOLEST SERPL-MCNC: 275 MG/DL (ref ?–200)
FASTING PATIENT LIPID ANSWER: NO
HDLC SERPL-MCNC: 55 MG/DL (ref 40–59)
LDLC SERPL CALC-MCNC: 168 MG/DL (ref ?–100)
NONHDLC SERPL-MCNC: 220 MG/DL (ref ?–130)
TRIGL SERPL-MCNC: 275 MG/DL (ref 30–149)
VLDLC SERPL CALC-MCNC: 56 MG/DL (ref 0–30)

## 2022-02-24 PROCEDURE — 3008F BODY MASS INDEX DOCD: CPT | Performed by: NURSE PRACTITIONER

## 2022-02-24 PROCEDURE — 99213 OFFICE O/P EST LOW 20 MIN: CPT | Performed by: NURSE PRACTITIONER

## 2022-02-24 PROCEDURE — 80061 LIPID PANEL: CPT

## 2022-02-24 PROCEDURE — 36415 COLL VENOUS BLD VENIPUNCTURE: CPT

## 2022-02-24 PROCEDURE — 3078F DIAST BP <80 MM HG: CPT | Performed by: NURSE PRACTITIONER

## 2022-02-24 PROCEDURE — 3074F SYST BP LT 130 MM HG: CPT | Performed by: NURSE PRACTITIONER

## 2022-02-24 PROCEDURE — 90471 IMMUNIZATION ADMIN: CPT | Performed by: NURSE PRACTITIONER

## 2022-02-24 PROCEDURE — 90750 HZV VACC RECOMBINANT IM: CPT | Performed by: NURSE PRACTITIONER

## 2022-02-24 RX ORDER — LEVOTHYROXINE SODIUM 0.07 MG/1
75 TABLET ORAL
Qty: 90 TABLET | Refills: 1 | Status: SHIPPED | OUTPATIENT
Start: 2022-02-24

## 2022-02-24 NOTE — ASSESSMENT & PLAN NOTE
Recheck lipid panel today  Please aim to eat a diet high in fresh fruits and vegetables, lean protein sources, complex carbohydrates and limited processed and fast foods. Try to get at least 150 minutes of exercise per week-a combination of weight resistance and cardio is preferred.

## 2022-02-24 NOTE — ASSESSMENT & PLAN NOTE
Pt to call gastro office for test results-no note in computer from Dr Burak Chris regarding results or f/u

## 2022-02-24 NOTE — PATIENT INSTRUCTIONS
Call gastroenterology for endoscopy and colonoscopy [phone]    Call for right breast mammogram [phone]

## 2022-02-28 RX ORDER — ROSUVASTATIN CALCIUM 20 MG/1
20 TABLET, COATED ORAL NIGHTLY
Qty: 90 TABLET | Refills: 1 | Status: SHIPPED | OUTPATIENT
Start: 2022-02-28

## 2022-04-04 ENCOUNTER — TELEPHONE (OUTPATIENT)
Dept: GASTROENTEROLOGY | Facility: CLINIC | Age: 62
End: 2022-04-04

## 2022-04-04 NOTE — TELEPHONE ENCOUNTER
Patient had a procedure with Dr. Burak Chris on 1/18/22 and is requesting a call regarding the results and also to have a copy of her results mailed to her.

## 2022-04-05 NOTE — TELEPHONE ENCOUNTER
Entered into Epic. Recall CLN in 10 years per Dr. Mickey Culver. Last CLN done 01/18/2022. Recall entered into Patient Outreach for 01/18/2032. Health Maintenance updated.

## 2022-04-05 NOTE — TELEPHONE ENCOUNTER
Printed operative & pathology report; per patient request to mail out to home address on file. Spoke with Luda Wilson & confirmed . Discussed MD results/recommendations 2/2 colonoscopy/egd findings. Declined f/u visit at this time. Is feeling fine at the moment. Able to retrieve op/pathology report from records and does not need copy via mail. Provided office number if symptoms reoccur. Verbalized understanding and appreciative for follow up. No further questions or concerns at this time.

## 2022-04-05 NOTE — TELEPHONE ENCOUNTER
NO etiology for diarrhea found- celiac negative, negative for colitis.   GI staff: please place recall in for colonoscopy in 10 years for screening purposes    Follow up with Sofia Norton NP for ongoing symptoms

## 2022-04-07 ENCOUNTER — OFFICE VISIT (OUTPATIENT)
Dept: OBGYN CLINIC | Facility: CLINIC | Age: 62
End: 2022-04-07
Payer: COMMERCIAL

## 2022-04-07 VITALS
DIASTOLIC BLOOD PRESSURE: 81 MMHG | HEART RATE: 80 BPM | BODY MASS INDEX: 22 KG/M2 | SYSTOLIC BLOOD PRESSURE: 150 MMHG | WEIGHT: 135 LBS

## 2022-04-07 DIAGNOSIS — N76.6 VULVAR ULCER: ICD-10-CM

## 2022-04-07 DIAGNOSIS — Z01.419 WOMEN'S ANNUAL ROUTINE GYNECOLOGICAL EXAMINATION: ICD-10-CM

## 2022-04-07 PROBLEM — Z98.890 POSTOPERATIVE STATE: Status: RESOLVED | Noted: 2018-12-26 | Resolved: 2022-04-07

## 2022-04-07 PROBLEM — Z00.00 WELL ADULT EXAM: Status: RESOLVED | Noted: 2019-08-02 | Resolved: 2022-04-07

## 2022-04-07 PROCEDURE — 87529 HSV DNA AMP PROBE: CPT | Performed by: OBSTETRICS & GYNECOLOGY

## 2022-04-07 RX ORDER — ACYCLOVIR 50 MG/G
1 OINTMENT TOPICAL
Qty: 15 G | Refills: 0 | Status: SHIPPED | OUTPATIENT
Start: 2022-04-07

## 2022-04-11 LAB
HSV1 DNA SPEC QL NAA+PROBE: NEGATIVE
HSV2 DNA SPEC QL NAA+PROBE: NEGATIVE

## 2022-04-21 ENCOUNTER — TELEPHONE (OUTPATIENT)
Dept: OBGYN CLINIC | Facility: CLINIC | Age: 62
End: 2022-04-21

## 2022-04-27 ENCOUNTER — APPOINTMENT (OUTPATIENT)
Dept: GENERAL RADIOLOGY | Age: 62
End: 2022-04-27
Attending: NURSE PRACTITIONER
Payer: COMMERCIAL

## 2022-04-27 ENCOUNTER — HOSPITAL ENCOUNTER (OUTPATIENT)
Age: 62
Discharge: HOME | End: 2022-04-27
Payer: COMMERCIAL

## 2022-04-27 VITALS
RESPIRATION RATE: 18 BRPM | HEART RATE: 87 BPM | DIASTOLIC BLOOD PRESSURE: 83 MMHG | SYSTOLIC BLOOD PRESSURE: 152 MMHG | TEMPERATURE: 98 F | OXYGEN SATURATION: 98 %

## 2022-04-27 DIAGNOSIS — J98.8 VIRAL RESPIRATORY ILLNESS: Primary | ICD-10-CM

## 2022-04-27 DIAGNOSIS — B97.89 VIRAL RESPIRATORY ILLNESS: Primary | ICD-10-CM

## 2022-04-27 LAB
POCT INFLUENZA A: NEGATIVE
POCT INFLUENZA B: NEGATIVE
S PYO AG THROAT QL: NEGATIVE
SARS-COV-2 RNA RESP QL NAA+PROBE: NOT DETECTED

## 2022-04-27 PROCEDURE — U0002 COVID-19 LAB TEST NON-CDC: HCPCS | Performed by: NURSE PRACTITIONER

## 2022-04-27 PROCEDURE — 99214 OFFICE O/P EST MOD 30 MIN: CPT | Performed by: NURSE PRACTITIONER

## 2022-04-27 PROCEDURE — 87502 INFLUENZA DNA AMP PROBE: CPT | Performed by: NURSE PRACTITIONER

## 2022-04-27 PROCEDURE — 87880 STREP A ASSAY W/OPTIC: CPT | Performed by: NURSE PRACTITIONER

## 2022-04-27 PROCEDURE — 71046 X-RAY EXAM CHEST 2 VIEWS: CPT | Performed by: NURSE PRACTITIONER

## 2022-04-27 RX ORDER — BENZONATATE 100 MG/1
100 CAPSULE ORAL 3 TIMES DAILY PRN
Qty: 20 CAPSULE | Refills: 0 | Status: SHIPPED | OUTPATIENT
Start: 2022-04-27 | End: 2022-05-04

## 2022-05-13 ENCOUNTER — OFFICE VISIT (OUTPATIENT)
Dept: FAMILY MEDICINE CLINIC | Facility: CLINIC | Age: 62
End: 2022-05-13
Payer: COMMERCIAL

## 2022-05-13 VITALS
SYSTOLIC BLOOD PRESSURE: 162 MMHG | DIASTOLIC BLOOD PRESSURE: 72 MMHG | HEART RATE: 70 BPM | BODY MASS INDEX: 22.16 KG/M2 | HEIGHT: 65 IN | WEIGHT: 133 LBS

## 2022-05-13 DIAGNOSIS — R03.0 ELEVATED BLOOD PRESSURE READING WITHOUT DIAGNOSIS OF HYPERTENSION: ICD-10-CM

## 2022-05-13 DIAGNOSIS — J01.00 ACUTE NON-RECURRENT MAXILLARY SINUSITIS: Primary | ICD-10-CM

## 2022-05-13 PROCEDURE — 99214 OFFICE O/P EST MOD 30 MIN: CPT | Performed by: NURSE PRACTITIONER

## 2022-05-13 PROCEDURE — 3078F DIAST BP <80 MM HG: CPT | Performed by: NURSE PRACTITIONER

## 2022-05-13 PROCEDURE — 3008F BODY MASS INDEX DOCD: CPT | Performed by: NURSE PRACTITIONER

## 2022-05-13 PROCEDURE — 3077F SYST BP >= 140 MM HG: CPT | Performed by: NURSE PRACTITIONER

## 2022-05-13 RX ORDER — CLARITHROMYCIN 500 MG/1
500 TABLET, COATED ORAL 2 TIMES DAILY
Qty: 20 TABLET | Refills: 0 | Status: SHIPPED | OUTPATIENT
Start: 2022-05-13 | End: 2022-05-23

## 2022-05-13 NOTE — ASSESSMENT & PLAN NOTE
Clarithromycin 500 mg I po bid x 10 days  Supportive care discussed to help alleviate symptoms  Please call if symptoms worsen or are not resolving.

## 2022-05-22 DIAGNOSIS — E03.9 ACQUIRED HYPOTHYROIDISM: ICD-10-CM

## 2022-05-23 RX ORDER — LEVOTHYROXINE SODIUM 0.07 MG/1
TABLET ORAL
Qty: 90 TABLET | Refills: 1 | Status: SHIPPED | OUTPATIENT
Start: 2022-05-23 | End: 2022-11-18

## 2022-05-23 NOTE — TELEPHONE ENCOUNTER
Refill passed per [facility] [place] protocol.     Hypothyroid Medications  Protocol Criteria:  Appointment scheduled in the past 12 months or the next 3 months  TSH resulted in the past 12 months that is normal  Recent Outpatient Visits            1 week ago Acute non-recurrent maxillary sinusitis    [address], Ubaldo Mazariegos, [place]    Office Visit    1 month ago [employer] annual routine gynecological examination    [address], [facility], Ariana Mills MD    Office Visit    2 months ago Mixed hyperlipidemia    [address], [place] Mazariegos, [place]    Office Visit    4 months ago Nausea and vomiting, unspecified vomiting type    [address] [place], [facility], Harjinder Reed, [place]    Office Visit    4 months ago Hypothyroidism due to [address], [address], Ubaldo Mazariegos, APRN    Office Visit          Lab Results   Component Value Date    TSH 2.320 12/28/2021

## 2022-07-29 ENCOUNTER — OFFICE VISIT (OUTPATIENT)
Dept: FAMILY MEDICINE CLINIC | Facility: CLINIC | Age: 62
End: 2022-07-29
Payer: COMMERCIAL

## 2022-07-29 VITALS
SYSTOLIC BLOOD PRESSURE: 154 MMHG | HEIGHT: 65 IN | HEART RATE: 61 BPM | BODY MASS INDEX: 22 KG/M2 | DIASTOLIC BLOOD PRESSURE: 82 MMHG

## 2022-07-29 DIAGNOSIS — J06.9 VIRAL UPPER RESPIRATORY TRACT INFECTION: Primary | ICD-10-CM

## 2022-07-29 DIAGNOSIS — I10 PRIMARY HYPERTENSION: ICD-10-CM

## 2022-07-29 PROCEDURE — 3077F SYST BP >= 140 MM HG: CPT | Performed by: NURSE PRACTITIONER

## 2022-07-29 PROCEDURE — 99214 OFFICE O/P EST MOD 30 MIN: CPT | Performed by: NURSE PRACTITIONER

## 2022-07-29 PROCEDURE — 3079F DIAST BP 80-89 MM HG: CPT | Performed by: NURSE PRACTITIONER

## 2022-07-29 RX ORDER — LISINOPRIL 10 MG/1
10 TABLET ORAL DAILY
Qty: 90 TABLET | Refills: 3 | Status: SHIPPED | OUTPATIENT
Start: 2022-07-29 | End: 2023-07-24

## 2022-07-29 NOTE — ASSESSMENT & PLAN NOTE
covid test  Supportive care discussed to help alleviate symptoms  Please call if symptoms worsen or are not resolving.   Encourage to take Coricidin hbp for s/s

## 2022-07-30 LAB — SARS-COV-2 RNA RESP QL NAA+PROBE: DETECTED

## 2022-11-11 ENCOUNTER — OFFICE VISIT (OUTPATIENT)
Dept: FAMILY MEDICINE CLINIC | Facility: CLINIC | Age: 62
End: 2022-11-11
Payer: COMMERCIAL

## 2022-11-11 VITALS
DIASTOLIC BLOOD PRESSURE: 67 MMHG | WEIGHT: 132 LBS | SYSTOLIC BLOOD PRESSURE: 102 MMHG | HEIGHT: 65 IN | HEART RATE: 65 BPM | BODY MASS INDEX: 21.99 KG/M2

## 2022-11-11 DIAGNOSIS — E78.2 MIXED HYPERLIPIDEMIA: ICD-10-CM

## 2022-11-11 DIAGNOSIS — J06.9 VIRAL UPPER RESPIRATORY TRACT INFECTION: ICD-10-CM

## 2022-11-11 DIAGNOSIS — E03.8 HYPOTHYROIDISM DUE TO HASHIMOTO'S THYROIDITIS: ICD-10-CM

## 2022-11-11 DIAGNOSIS — I10 PRIMARY HYPERTENSION: Primary | ICD-10-CM

## 2022-11-11 DIAGNOSIS — E06.3 HYPOTHYROIDISM DUE TO HASHIMOTO'S THYROIDITIS: ICD-10-CM

## 2022-11-11 PROCEDURE — 3008F BODY MASS INDEX DOCD: CPT | Performed by: NURSE PRACTITIONER

## 2022-11-11 PROCEDURE — 99214 OFFICE O/P EST MOD 30 MIN: CPT | Performed by: NURSE PRACTITIONER

## 2022-11-11 PROCEDURE — 3074F SYST BP LT 130 MM HG: CPT | Performed by: NURSE PRACTITIONER

## 2022-11-11 PROCEDURE — 3078F DIAST BP <80 MM HG: CPT | Performed by: NURSE PRACTITIONER

## 2022-11-11 RX ORDER — ROSUVASTATIN CALCIUM 20 MG/1
20 TABLET, COATED ORAL NIGHTLY
Qty: 90 TABLET | Refills: 1 | Status: SHIPPED | OUTPATIENT
Start: 2022-11-11

## 2022-11-12 ENCOUNTER — LAB ENCOUNTER (OUTPATIENT)
Dept: LAB | Age: 62
End: 2022-11-12
Attending: NURSE PRACTITIONER
Payer: COMMERCIAL

## 2022-11-12 DIAGNOSIS — E03.8 HYPOTHYROIDISM DUE TO HASHIMOTO'S THYROIDITIS: ICD-10-CM

## 2022-11-12 DIAGNOSIS — E06.3 HYPOTHYROIDISM DUE TO HASHIMOTO'S THYROIDITIS: ICD-10-CM

## 2022-11-12 DIAGNOSIS — E78.2 MIXED HYPERLIPIDEMIA: ICD-10-CM

## 2022-11-12 DIAGNOSIS — E78.2 MIXED HYPERLIPIDEMIA: Primary | ICD-10-CM

## 2022-11-12 DIAGNOSIS — I10 PRIMARY HYPERTENSION: ICD-10-CM

## 2022-11-12 LAB
ALBUMIN SERPL-MCNC: 3.6 G/DL (ref 3.4–5)
ALBUMIN/GLOB SERPL: 1.3 {RATIO} (ref 1–2)
ALP LIVER SERPL-CCNC: 90 U/L
ALT SERPL-CCNC: 25 U/L
ANION GAP SERPL CALC-SCNC: 7 MMOL/L (ref 0–18)
AST SERPL-CCNC: 13 U/L (ref 15–37)
BILIRUB SERPL-MCNC: 0.3 MG/DL (ref 0.1–2)
BUN BLD-MCNC: 12 MG/DL (ref 7–18)
BUN/CREAT SERPL: 15.2 (ref 10–20)
CALCIUM BLD-MCNC: 8.9 MG/DL (ref 8.5–10.1)
CHLORIDE SERPL-SCNC: 111 MMOL/L (ref 98–112)
CHOLEST SERPL-MCNC: 219 MG/DL (ref ?–200)
CO2 SERPL-SCNC: 25 MMOL/L (ref 21–32)
CREAT BLD-MCNC: 0.79 MG/DL
FASTING PATIENT LIPID ANSWER: YES
FASTING STATUS PATIENT QL REPORTED: YES
FLUAV + FLUBV RNA SPEC NAA+PROBE: NOT DETECTED
FLUAV + FLUBV RNA SPEC NAA+PROBE: NOT DETECTED
GFR SERPLBLD BASED ON 1.73 SQ M-ARVRAT: 85 ML/MIN/1.73M2 (ref 60–?)
GLOBULIN PLAS-MCNC: 2.7 G/DL (ref 2.8–4.4)
GLUCOSE BLD-MCNC: 88 MG/DL (ref 70–99)
HDLC SERPL-MCNC: 32 MG/DL (ref 40–59)
LDLC SERPL CALC-MCNC: 105 MG/DL (ref ?–100)
NONHDLC SERPL-MCNC: 187 MG/DL (ref ?–130)
OSMOLALITY SERPL CALC.SUM OF ELEC: 295 MOSM/KG (ref 275–295)
POTASSIUM SERPL-SCNC: 4 MMOL/L (ref 3.5–5.1)
PROT SERPL-MCNC: 6.3 G/DL (ref 6.4–8.2)
RSV RNA SPEC NAA+PROBE: NOT DETECTED
SARS-COV-2 RNA RESP QL NAA+PROBE: NOT DETECTED
SODIUM SERPL-SCNC: 143 MMOL/L (ref 136–145)
TRIGL SERPL-MCNC: 479 MG/DL (ref 30–149)
TSI SER-ACNC: 2.25 MIU/ML (ref 0.36–3.74)
VLDLC SERPL CALC-MCNC: 84 MG/DL (ref 0–30)

## 2022-11-12 PROCEDURE — 80053 COMPREHEN METABOLIC PANEL: CPT

## 2022-11-12 PROCEDURE — 84443 ASSAY THYROID STIM HORMONE: CPT

## 2022-11-12 PROCEDURE — 80061 LIPID PANEL: CPT

## 2022-11-12 PROCEDURE — 36415 COLL VENOUS BLD VENIPUNCTURE: CPT

## 2022-11-12 RX ORDER — OMEGA-3-ACID ETHYL ESTERS 1 G/1
CAPSULE, LIQUID FILLED ORAL
Qty: 360 CAPSULE | Refills: 3 | Status: SHIPPED | OUTPATIENT
Start: 2022-11-12

## 2022-11-14 ENCOUNTER — TELEPHONE (OUTPATIENT)
Dept: FAMILY MEDICINE CLINIC | Facility: CLINIC | Age: 62
End: 2022-11-14

## 2022-11-14 DIAGNOSIS — J30.1 SEASONAL ALLERGIC RHINITIS DUE TO POLLEN: ICD-10-CM

## 2022-11-14 DIAGNOSIS — L30.9 DERMATITIS: ICD-10-CM

## 2022-11-14 DIAGNOSIS — Z12.5 SCREENING PSA (PROSTATE SPECIFIC ANTIGEN): Primary | ICD-10-CM

## 2022-11-14 DIAGNOSIS — E78.2 MIXED HYPERLIPIDEMIA: ICD-10-CM

## 2022-11-14 PROBLEM — J01.00 ACUTE NON-RECURRENT MAXILLARY SINUSITIS: Status: RESOLVED | Noted: 2018-02-13 | Resolved: 2022-11-14

## 2022-11-14 NOTE — ASSESSMENT & PLAN NOTE
Respiratory panel nasal swab   Supportive care discussed to help alleviate symptoms  Please call if symptoms worsen or are not resolving.

## 2022-11-14 NOTE — ASSESSMENT & PLAN NOTE
Continue lisinopril 10 mg daily. Encourage low-sodium diet.   Exercise for goal of at least 150 min/week; combination weight training and cardio exercises

## 2022-11-15 RX ORDER — FENOFIBRATE 134 MG/1
134 CAPSULE ORAL NIGHTLY
Qty: 90 CAPSULE | Refills: 1 | Status: SHIPPED | OUTPATIENT
Start: 2022-11-15 | End: 2022-12-15

## 2022-11-15 NOTE — TELEPHONE ENCOUNTER
Spoke with Fabiana (Mikayla) - [facility], verified patient's Name and . Okay to change fenofibrate to 134 mg as covered by patient's insurance.

## 2022-11-15 NOTE — TELEPHONE ENCOUNTER
Pharmacy calling to state fenofibrate 135 is not covered, but will cover 134, asking if ok to changed.

## 2022-11-15 NOTE — TELEPHONE ENCOUNTER
Is it the fexofenadine or the choline fenofibrate that is not covered. Fexofenadine is 180 mg not 135mg.

## 2022-11-18 DIAGNOSIS — E03.9 ACQUIRED HYPOTHYROIDISM: ICD-10-CM

## 2022-11-18 RX ORDER — LEVOTHYROXINE SODIUM 0.07 MG/1
TABLET ORAL
Qty: 90 TABLET | Refills: 1 | Status: SHIPPED | OUTPATIENT
Start: 2022-11-18

## 2022-11-19 NOTE — TELEPHONE ENCOUNTER
Refill passed per [address] protocol.   Requested Prescriptions   Pending Prescriptions Disp Refills    LEVOTHYROXINE 75 MCG Oral Tab [Pharmacy Med Name: Levothyroxine Sodium 75 MCG Oral Tablet] 90 tablet 0     Sig: TAKE 1 TABLET BY MOUTH ONCE DAILY IN THE MORNING BEFORE BREAKFAST       Thyroid Medication Protocol Passed - 11/18/2022  1:54 AM        Passed - TSH in past 12 months        Passed - Last TSH value is normal     Lab Results   Component Value Date    TSH 2.250 11/12/2022                 Passed - In person appointment or virtual visit in the past 12 mos or appointment in next 3 mos     Recent Outpatient Visits              1 week ago Primary hypertension    [address], [facility] [address], [address], [place]    Office Visit    3 months ago Viral upper respiratory tract infection    [address], [facility], [address], [place]    Office Visit    6 months ago Acute non-recurrent maxillary sinusitis    [address], [facility], [address], [place]    Office Visit    7 months ago [facility] annual routine gynecological examination    [address], [address] [facility], Savi Michelle MD    Office Visit    8 months ago Mixed hyperlipidemia    [address], [facility], [address], Kate Roper, APRN    Office Visit

## 2023-01-11 ENCOUNTER — OFFICE VISIT (OUTPATIENT)
Dept: FAMILY MEDICINE CLINIC | Facility: CLINIC | Age: 63
End: 2023-01-11

## 2023-01-11 VITALS
DIASTOLIC BLOOD PRESSURE: 82 MMHG | SYSTOLIC BLOOD PRESSURE: 135 MMHG | HEIGHT: 65 IN | WEIGHT: 131 LBS | BODY MASS INDEX: 21.83 KG/M2 | HEART RATE: 73 BPM

## 2023-01-11 DIAGNOSIS — E04.1 THYROID NODULE: ICD-10-CM

## 2023-01-11 DIAGNOSIS — E03.8 HYPOTHYROIDISM DUE TO HASHIMOTO'S THYROIDITIS: ICD-10-CM

## 2023-01-11 DIAGNOSIS — Z12.31 BREAST CANCER SCREENING BY MAMMOGRAM: ICD-10-CM

## 2023-01-11 DIAGNOSIS — Z00.00 WELL ADULT EXAM: Primary | ICD-10-CM

## 2023-01-11 DIAGNOSIS — Z23 INFLUENZA VACCINE NEEDED: ICD-10-CM

## 2023-01-11 DIAGNOSIS — K52.9 CHRONIC DIARRHEA: ICD-10-CM

## 2023-01-11 DIAGNOSIS — I10 PRIMARY HYPERTENSION: ICD-10-CM

## 2023-01-11 DIAGNOSIS — E06.3 HYPOTHYROIDISM DUE TO HASHIMOTO'S THYROIDITIS: ICD-10-CM

## 2023-01-11 DIAGNOSIS — R19.7 DIARRHEA, UNSPECIFIED TYPE: ICD-10-CM

## 2023-01-11 DIAGNOSIS — E78.2 MIXED HYPERLIPIDEMIA: ICD-10-CM

## 2023-01-11 DIAGNOSIS — R49.9 CHANGE IN VOICE: ICD-10-CM

## 2023-01-11 DIAGNOSIS — M54.2 NECK PAIN: ICD-10-CM

## 2023-01-11 DIAGNOSIS — E07.89 THYROID PAIN: ICD-10-CM

## 2023-01-11 DIAGNOSIS — L05.91 CYST NEAR COCCYX: ICD-10-CM

## 2023-01-11 PROBLEM — R87.810 ASCUS WITH POSITIVE HIGH RISK HPV CERVICAL: Status: RESOLVED | Noted: 2018-08-10 | Resolved: 2023-01-11

## 2023-01-11 PROBLEM — J06.9 VIRAL UPPER RESPIRATORY TRACT INFECTION: Status: RESOLVED | Noted: 2022-07-29 | Resolved: 2023-01-11

## 2023-01-11 PROBLEM — N88.9 ABNORMAL CERVIX FINDING: Status: RESOLVED | Noted: 2018-11-02 | Resolved: 2023-01-11

## 2023-01-11 PROBLEM — N87.0 DYSPLASIA OF CERVIX, LOW GRADE (CIN 1): Status: RESOLVED | Noted: 2018-09-11 | Resolved: 2023-01-11

## 2023-01-11 PROBLEM — R87.610 ASCUS WITH POSITIVE HIGH RISK HPV CERVICAL: Status: RESOLVED | Noted: 2018-08-10 | Resolved: 2023-01-11

## 2023-01-11 PROBLEM — N87.9 CERVICAL DYSPLASIA: Status: RESOLVED | Noted: 2018-11-28 | Resolved: 2023-01-11

## 2023-01-11 PROCEDURE — 90471 IMMUNIZATION ADMIN: CPT | Performed by: NURSE PRACTITIONER

## 2023-01-11 PROCEDURE — 3079F DIAST BP 80-89 MM HG: CPT | Performed by: NURSE PRACTITIONER

## 2023-01-11 PROCEDURE — 3008F BODY MASS INDEX DOCD: CPT | Performed by: NURSE PRACTITIONER

## 2023-01-11 PROCEDURE — 90686 IIV4 VACC NO PRSV 0.5 ML IM: CPT | Performed by: NURSE PRACTITIONER

## 2023-01-11 PROCEDURE — 3075F SYST BP GE 130 - 139MM HG: CPT | Performed by: NURSE PRACTITIONER

## 2023-01-11 PROCEDURE — 99213 OFFICE O/P EST LOW 20 MIN: CPT | Performed by: NURSE PRACTITIONER

## 2023-01-11 PROCEDURE — 99396 PREV VISIT EST AGE 40-64: CPT | Performed by: NURSE PRACTITIONER

## 2023-01-11 NOTE — ASSESSMENT & PLAN NOTE
Refer physiatry for further eval  Is seeing chiropractor. Hx of hypothyroid   - C/w armor thyroid 45mg in the AM

## 2023-01-11 NOTE — ASSESSMENT & PLAN NOTE
Screening labs  Please aim to eat a diet high in fresh fruits and vegetables, lean protein sources, complex carbohydrates and limited processed and fast foods. Try to get at least 150 minutes of exercise per week-a combination of weight resistance and cardio is preferred.     Mammogram ordered-will do follow up at [facility] due to bx  Flu shot today

## 2023-01-13 ENCOUNTER — LAB ENCOUNTER (OUTPATIENT)
Dept: LAB | Age: 63
End: 2023-01-13
Attending: NURSE PRACTITIONER
Payer: COMMERCIAL

## 2023-01-13 DIAGNOSIS — Z00.00 WELL ADULT EXAM: ICD-10-CM

## 2023-01-13 LAB
ALBUMIN SERPL-MCNC: 3.9 G/DL (ref 3.4–5)
ALBUMIN/GLOB SERPL: 1.3 {RATIO} (ref 1–2)
ALP LIVER SERPL-CCNC: 85 U/L
ALT SERPL-CCNC: 35 U/L
ANION GAP SERPL CALC-SCNC: 4 MMOL/L (ref 0–18)
AST SERPL-CCNC: 16 U/L (ref 15–37)
BILIRUB SERPL-MCNC: 0.5 MG/DL (ref 0.1–2)
BILIRUB UR QL: NEGATIVE
BUN BLD-MCNC: 18 MG/DL (ref 7–18)
BUN/CREAT SERPL: 22 (ref 10–20)
CALCIUM BLD-MCNC: 9.2 MG/DL (ref 8.5–10.1)
CHLORIDE SERPL-SCNC: 109 MMOL/L (ref 98–112)
CHOLEST SERPL-MCNC: 238 MG/DL (ref ?–200)
CLARITY UR: CLEAR
CO2 SERPL-SCNC: 31 MMOL/L (ref 21–32)
COLOR UR: YELLOW
CREAT BLD-MCNC: 0.82 MG/DL
DEPRECATED RDW RBC AUTO: 41.4 FL (ref 35.1–46.3)
ERYTHROCYTE [DISTWIDTH] IN BLOOD BY AUTOMATED COUNT: 11.9 % (ref 11–15)
EST. AVERAGE GLUCOSE BLD GHB EST-MCNC: 120 MG/DL (ref 68–126)
FASTING PATIENT LIPID ANSWER: YES
FASTING STATUS PATIENT QL REPORTED: YES
GFR SERPLBLD BASED ON 1.73 SQ M-ARVRAT: 81 ML/MIN/1.73M2 (ref 60–?)
GLOBULIN PLAS-MCNC: 3.1 G/DL (ref 2.8–4.4)
GLUCOSE BLD-MCNC: 100 MG/DL (ref 70–99)
GLUCOSE UR-MCNC: NEGATIVE MG/DL
HBA1C MFR BLD: 5.8 % (ref ?–5.7)
HCT VFR BLD AUTO: 43.5 %
HDLC SERPL-MCNC: 64 MG/DL (ref 40–59)
HGB BLD-MCNC: 14.4 G/DL
KETONES UR-MCNC: NEGATIVE MG/DL
LDLC SERPL CALC-MCNC: 149 MG/DL (ref ?–100)
MCH RBC QN AUTO: 31.2 PG (ref 26–34)
MCHC RBC AUTO-ENTMCNC: 33.1 G/DL (ref 31–37)
MCV RBC AUTO: 94.2 FL
NITRITE UR QL STRIP.AUTO: NEGATIVE
NONHDLC SERPL-MCNC: 174 MG/DL (ref ?–130)
OSMOLALITY SERPL CALC.SUM OF ELEC: 300 MOSM/KG (ref 275–295)
PH UR: 7 [PH] (ref 5–8)
PLATELET # BLD AUTO: 242 10(3)UL (ref 150–450)
POTASSIUM SERPL-SCNC: 4.3 MMOL/L (ref 3.5–5.1)
PROT SERPL-MCNC: 7 G/DL (ref 6.4–8.2)
PROT UR-MCNC: NEGATIVE MG/DL
RBC # BLD AUTO: 4.62 X10(6)UL
SODIUM SERPL-SCNC: 144 MMOL/L (ref 136–145)
SP GR UR STRIP: 1.02 (ref 1–1.03)
TRIGL SERPL-MCNC: 141 MG/DL (ref 30–149)
TSI SER-ACNC: 2.28 MIU/ML (ref 0.36–3.74)
UROBILINOGEN UR STRIP-ACNC: 0.2
VIT B12 SERPL-MCNC: 960 PG/ML (ref 193–986)
VLDLC SERPL CALC-MCNC: 27 MG/DL (ref 0–30)
WBC # BLD AUTO: 5.1 X10(3) UL (ref 4–11)

## 2023-01-13 PROCEDURE — 36415 COLL VENOUS BLD VENIPUNCTURE: CPT

## 2023-01-13 PROCEDURE — 85027 COMPLETE CBC AUTOMATED: CPT

## 2023-01-13 PROCEDURE — 80061 LIPID PANEL: CPT

## 2023-01-13 PROCEDURE — 80053 COMPREHEN METABOLIC PANEL: CPT

## 2023-01-13 PROCEDURE — 81001 URINALYSIS AUTO W/SCOPE: CPT | Performed by: NURSE PRACTITIONER

## 2023-01-13 PROCEDURE — 83036 HEMOGLOBIN GLYCOSYLATED A1C: CPT

## 2023-01-13 PROCEDURE — 81015 MICROSCOPIC EXAM OF URINE: CPT | Performed by: NURSE PRACTITIONER

## 2023-01-13 PROCEDURE — 82607 VITAMIN B-12: CPT

## 2023-01-13 PROCEDURE — 87086 URINE CULTURE/COLONY COUNT: CPT | Performed by: NURSE PRACTITIONER

## 2023-01-13 PROCEDURE — 84443 ASSAY THYROID STIM HORMONE: CPT

## 2023-01-15 DIAGNOSIS — E06.3 HYPOTHYROIDISM DUE TO HASHIMOTO'S THYROIDITIS: Primary | ICD-10-CM

## 2023-01-15 DIAGNOSIS — E03.9 ACQUIRED HYPOTHYROIDISM: ICD-10-CM

## 2023-01-15 DIAGNOSIS — E78.2 MIXED HYPERLIPIDEMIA: ICD-10-CM

## 2023-01-15 DIAGNOSIS — I10 PRIMARY HYPERTENSION: ICD-10-CM

## 2023-01-15 DIAGNOSIS — E03.8 HYPOTHYROIDISM DUE TO HASHIMOTO'S THYROIDITIS: Primary | ICD-10-CM

## 2023-01-15 RX ORDER — OMEGA-3-ACID ETHYL ESTERS 1 G/1
CAPSULE, LIQUID FILLED ORAL
Qty: 360 CAPSULE | Refills: 3 | Status: SHIPPED | OUTPATIENT
Start: 2023-01-15

## 2023-01-15 RX ORDER — LEVOTHYROXINE SODIUM 0.07 MG/1
75 TABLET ORAL
Qty: 90 TABLET | Refills: 1 | Status: SHIPPED | OUTPATIENT
Start: 2023-01-15

## 2023-01-15 RX ORDER — ROSUVASTATIN CALCIUM 40 MG/1
40 TABLET, COATED ORAL NIGHTLY
Qty: 90 TABLET | Refills: 1 | Status: SHIPPED | OUTPATIENT
Start: 2023-01-15

## 2023-01-15 RX ORDER — LISINOPRIL 10 MG/1
10 TABLET ORAL DAILY
Qty: 90 TABLET | Refills: 3 | Status: SHIPPED | OUTPATIENT
Start: 2023-01-15 | End: 2024-01-10

## 2023-01-15 RX ORDER — ROSUVASTATIN CALCIUM 20 MG/1
20 TABLET, COATED ORAL NIGHTLY
Qty: 90 TABLET | Refills: 1 | Status: SHIPPED | OUTPATIENT
Start: 2023-01-15 | End: 2023-01-15 | Stop reason: DRUGHIGH

## 2023-02-01 ENCOUNTER — OFFICE VISIT (OUTPATIENT)
Dept: PHYSICAL MEDICINE AND REHAB | Facility: CLINIC | Age: 63
End: 2023-02-01
Payer: COMMERCIAL

## 2023-02-01 ENCOUNTER — HOSPITAL ENCOUNTER (OUTPATIENT)
Dept: GENERAL RADIOLOGY | Facility: HOSPITAL | Age: 63
Discharge: HOME | End: 2023-02-01
Attending: PHYSICAL MEDICINE & REHABILITATION
Payer: COMMERCIAL

## 2023-02-01 VITALS
DIASTOLIC BLOOD PRESSURE: 86 MMHG | SYSTOLIC BLOOD PRESSURE: 130 MMHG | BODY MASS INDEX: 21.83 KG/M2 | HEIGHT: 65 IN | WEIGHT: 131 LBS

## 2023-02-01 DIAGNOSIS — M54.2 TRIGGER POINT OF NECK: ICD-10-CM

## 2023-02-01 DIAGNOSIS — I10 ESSENTIAL HYPERTENSION: ICD-10-CM

## 2023-02-01 DIAGNOSIS — M47.812 CERVICAL FACET SYNDROME: ICD-10-CM

## 2023-02-01 DIAGNOSIS — E78.5 HYPERLIPIDEMIA, UNSPECIFIED HYPERLIPIDEMIA TYPE: ICD-10-CM

## 2023-02-01 DIAGNOSIS — I10 ESSENTIAL HYPERTENSION: Primary | ICD-10-CM

## 2023-02-01 DIAGNOSIS — M50.30 DDD (DEGENERATIVE DISC DISEASE), CERVICAL: ICD-10-CM

## 2023-02-01 PROCEDURE — 3008F BODY MASS INDEX DOCD: CPT | Performed by: PHYSICAL MEDICINE & REHABILITATION

## 2023-02-01 PROCEDURE — 3075F SYST BP GE 130 - 139MM HG: CPT | Performed by: PHYSICAL MEDICINE & REHABILITATION

## 2023-02-01 PROCEDURE — 3079F DIAST BP 80-89 MM HG: CPT | Performed by: PHYSICAL MEDICINE & REHABILITATION

## 2023-02-01 PROCEDURE — 99244 OFF/OP CNSLTJ NEW/EST MOD 40: CPT | Performed by: PHYSICAL MEDICINE & REHABILITATION

## 2023-02-01 PROCEDURE — 72050 X-RAY EXAM NECK SPINE 4/5VWS: CPT | Performed by: PHYSICAL MEDICINE & REHABILITATION

## 2023-02-01 RX ORDER — NAPROXEN 500 MG/1
500 TABLET ORAL 2 TIMES DAILY WITH MEALS
Qty: 60 TABLET | Refills: 0 | Status: SHIPPED | OUTPATIENT
Start: 2023-02-01

## 2023-02-01 NOTE — PATIENT INSTRUCTIONS
1) Take Naprosyn 500 mg 1 tablet twice per day with food for the next two weeks and then as needed but no more than 2 tablets per day. Do not take with any other NSAIDS (Ibuprofen, Advil, Aleve, etc). OK to take Tylenol 500 mg every 6 hours as needed for pain. If you develop any side effects including stomach aches, nausea, vomiting, or other gastrointestinal symptoms, stop the medication and call my office. 2) Please get X-rays of the Cervical spine today on your way out. 3) Please begin physical therapy as soon as possible. 4) Tylenol 500-1000 mg every 6-8 hours as needed for pain. No more than 3000 mg daily. 5) Follow up with me in about 6-8 weeks.  If symptoms persist, then would recommend BILATERAL C4-C5, C5-C6, and C6-C7 facet joint injections

## 2023-02-07 ENCOUNTER — HOSPITAL ENCOUNTER (OUTPATIENT)
Dept: ULTRASOUND IMAGING | Age: 63
Discharge: HOME | End: 2023-02-07
Attending: NURSE PRACTITIONER
Payer: COMMERCIAL

## 2023-02-07 DIAGNOSIS — R49.9 CHANGE IN VOICE: ICD-10-CM

## 2023-02-07 DIAGNOSIS — E07.89 THYROID PAIN: ICD-10-CM

## 2023-02-07 PROCEDURE — 76536 US EXAM OF HEAD AND NECK: CPT | Performed by: NURSE PRACTITIONER

## 2023-02-15 ENCOUNTER — OFFICE VISIT (OUTPATIENT)
Dept: FAMILY MEDICINE CLINIC | Facility: CLINIC | Age: 63
End: 2023-02-15

## 2023-02-15 VITALS
HEIGHT: 65 IN | SYSTOLIC BLOOD PRESSURE: 120 MMHG | DIASTOLIC BLOOD PRESSURE: 60 MMHG | HEART RATE: 69 BPM | BODY MASS INDEX: 22.16 KG/M2 | WEIGHT: 133 LBS

## 2023-02-15 DIAGNOSIS — M54.2 NECK PAIN: Primary | ICD-10-CM

## 2023-02-15 PROCEDURE — 99213 OFFICE O/P EST LOW 20 MIN: CPT | Performed by: NURSE PRACTITIONER

## 2023-02-15 PROCEDURE — 3074F SYST BP LT 130 MM HG: CPT | Performed by: NURSE PRACTITIONER

## 2023-02-15 PROCEDURE — 3008F BODY MASS INDEX DOCD: CPT | Performed by: NURSE PRACTITIONER

## 2023-02-15 PROCEDURE — 3078F DIAST BP <80 MM HG: CPT | Performed by: NURSE PRACTITIONER

## 2023-02-15 RX ORDER — FENOFIBRATE 134 MG/1
1 CAPSULE ORAL DAILY
COMMUNITY
Start: 2023-02-08

## 2023-02-17 NOTE — ASSESSMENT & PLAN NOTE
Has referral approved for PT  Follow up Dr Shine Santiago placed. Please call if symptoms worsen or are not resolving.

## 2023-03-14 ENCOUNTER — TELEPHONE (OUTPATIENT)
Dept: PHYSICAL THERAPY | Facility: HOSPITAL | Age: 63
End: 2023-03-14

## 2023-03-16 ENCOUNTER — OFFICE VISIT (OUTPATIENT)
Dept: PHYSICAL THERAPY | Age: 63
End: 2023-03-16
Attending: PHYSICAL MEDICINE & REHABILITATION
Payer: COMMERCIAL

## 2023-03-16 DIAGNOSIS — M47.812 CERVICAL FACET SYNDROME: ICD-10-CM

## 2023-03-16 DIAGNOSIS — M54.2 TRIGGER POINT OF NECK: ICD-10-CM

## 2023-03-16 DIAGNOSIS — M50.30 DDD (DEGENERATIVE DISC DISEASE), CERVICAL: ICD-10-CM

## 2023-03-16 DIAGNOSIS — I10 ESSENTIAL HYPERTENSION: ICD-10-CM

## 2023-03-16 DIAGNOSIS — E78.5 HYPERLIPIDEMIA, UNSPECIFIED HYPERLIPIDEMIA TYPE: ICD-10-CM

## 2023-03-16 PROCEDURE — 97140 MANUAL THERAPY 1/> REGIONS: CPT | Performed by: PHYSICAL THERAPIST

## 2023-03-16 PROCEDURE — 97161 PT EVAL LOW COMPLEX 20 MIN: CPT | Performed by: PHYSICAL THERAPIST

## 2023-03-16 PROCEDURE — 97110 THERAPEUTIC EXERCISES: CPT | Performed by: PHYSICAL THERAPIST

## 2023-03-20 ENCOUNTER — OFFICE VISIT (OUTPATIENT)
Dept: PHYSICAL THERAPY | Age: 63
End: 2023-03-20
Attending: PHYSICAL MEDICINE & REHABILITATION
Payer: COMMERCIAL

## 2023-03-20 PROCEDURE — 97140 MANUAL THERAPY 1/> REGIONS: CPT | Performed by: PHYSICAL THERAPIST

## 2023-03-20 PROCEDURE — 97110 THERAPEUTIC EXERCISES: CPT | Performed by: PHYSICAL THERAPIST

## 2023-03-27 ENCOUNTER — OFFICE VISIT (OUTPATIENT)
Dept: PHYSICAL THERAPY | Age: 63
End: 2023-03-27
Attending: PHYSICAL MEDICINE & REHABILITATION
Payer: COMMERCIAL

## 2023-03-27 PROCEDURE — 97140 MANUAL THERAPY 1/> REGIONS: CPT | Performed by: PHYSICAL THERAPIST

## 2023-03-27 PROCEDURE — 97110 THERAPEUTIC EXERCISES: CPT | Performed by: PHYSICAL THERAPIST

## 2023-03-30 ENCOUNTER — OFFICE VISIT (OUTPATIENT)
Dept: PHYSICAL THERAPY | Age: 63
End: 2023-03-30
Attending: PHYSICAL MEDICINE & REHABILITATION
Payer: COMMERCIAL

## 2023-03-30 PROCEDURE — 97140 MANUAL THERAPY 1/> REGIONS: CPT | Performed by: PHYSICAL THERAPIST

## 2023-03-30 PROCEDURE — 97110 THERAPEUTIC EXERCISES: CPT | Performed by: PHYSICAL THERAPIST

## 2023-04-03 ENCOUNTER — OFFICE VISIT (OUTPATIENT)
Dept: PHYSICAL THERAPY | Age: 63
End: 2023-04-03
Attending: PHYSICAL MEDICINE & REHABILITATION
Payer: COMMERCIAL

## 2023-04-03 PROCEDURE — 97110 THERAPEUTIC EXERCISES: CPT | Performed by: PHYSICAL THERAPIST

## 2023-04-03 PROCEDURE — 97140 MANUAL THERAPY 1/> REGIONS: CPT | Performed by: PHYSICAL THERAPIST

## 2023-04-03 PROCEDURE — 97112 NEUROMUSCULAR REEDUCATION: CPT | Performed by: PHYSICAL THERAPIST

## 2023-04-03 NOTE — PROGRESS NOTES
Dx: Essential hypertension (I10)  DDD (degenerative disc disease), cervical (M50.30)  Hyperlipidemia, unspecified hyperlipidemia type (E78.5)  Trigger point of neck (M54.2)  Cervical facet syndrome (M47.812)           Insurance (Authorized # of Visits):  15           Authorizing Physician: Dr. Skyla Courtney MD visit: none scheduled  Fall Risk: standard         Precautions: n/a           Medication changes per patient? NO  Date of evaluation/PN: 3/16/2023  Pain ratin/10  Subjective: Only c/o LBP with radicular symptoms to (R)LE upon arrival; no neck symptoms reported. Objective: Lumbar comorbidity limiting progression to load handling/functional activities/strengthening in loaded positions. Assessment: Will need to monitor lumbar comorbidity with progressions of UE functional strengthening/load handling. Goals: In progress as follows:  Teri Riding will demonstrate improved postural awareness to reduce tissue overload and irritability. Teri Riding will improve sleep hygiene with <2 episodes of waking due to pain. Teri Riding will have improved proximal strength to 4+ to 5/5 to promote proximal stability and postural control and tolerance to sustained load handling. Teri Riding will be (I) with a home program to allow discharge from PT towards further self-recovery and maintenance of function. Plan: Continue to progress with comorbidities in consieration. Date: 3/20/2023  TX#:  Date: 3/27/2023                 TX#: 3/12 Date: 3/30/2023                TX#:  Date: 4/3/2023                 TX#:  Date:    Tx#: 6/   THERAPEUTIC EX 23 mins 23 mins 38 mins 30 mins    Scifit UE only focus on upright posture 4 mins ea fwd/retro 5 mins ea fwd/retro 5 mins ea fwd/retro 5 mins ea fwd/retro    Doorway stretch A and 90/90 2 x 30 secs ea       Cervical retraction  Supine passive 3x10 Supine 3x10 AA Supine 5 x 10 AA     Scapular retraction with ER Seated with foam roll b/w scap yellow tband 3x10       Prone V, W, horizontal abd 2 x 10 ea 2x10 ea 2x10 ea with 1lb dumbell thumb up/down with horizontal abd     Prone upper back ext 2 x 10 2x10 3x10     SB push up plus with walk up  3x10 RSB 2x10 GSB 3x10    Wall angels   3x10 Against 1/2 foam roll on wall 3x10 Against 1/2 foam roll on wall 3x10    REIL   2x10     Seated thoracic ext   1/2 foam roll 2x10     Lat pull down   Multi-pull 10lbs 2x10 Multi pull 15lbs 3x10    Low rows    Multi pull 15lbs 3x10    Standing flexion/scaption    90 with 1lb ea UE and back into 1/2 foam roll 2x10 ea                                            NEUROMUSCULAR RE-EDUCATION    8 mins    Standing with forehead on ball against wall V, W, horizontal abd    2x10 ea            MANUAL TX 23 mins 23 min 15 mins 15 mins    Cervical traction Supine manual x 5 mins Supine manual x 5 mins Supine manual x 5 mins Supine manual x 5 mins    Joint mobilization Cervical sideglides/rotation x 10 mins Cervical sideglides/rotation x 10 mins Cervical SG/rot x 5 mins Cervical SG/rot x 5 mins    STM  Paracervicals/UT/scalenes/levator x 8 mins Paracervicals/UT/scalenes/levator x 8 mins Paracervicals/UT/scalenes/levator x 5 mins Paracervicals/UT/scalenes/levator x 5 mins    HEP: Continue initial HEP. Charges:  Therapeutic ex x 2; Neuromuscular re-education x 1; Manual Tx x 1       Total Timed Treatment: 53 min  Total Treatment Time: 53 min

## 2023-04-11 ENCOUNTER — OFFICE VISIT (OUTPATIENT)
Dept: PHYSICAL THERAPY | Age: 63
End: 2023-04-11
Attending: PHYSICAL MEDICINE & REHABILITATION
Payer: COMMERCIAL

## 2023-04-11 PROCEDURE — 97112 NEUROMUSCULAR REEDUCATION: CPT | Performed by: PHYSICAL THERAPIST

## 2023-04-11 PROCEDURE — 97110 THERAPEUTIC EXERCISES: CPT | Performed by: PHYSICAL THERAPIST

## 2023-04-11 PROCEDURE — 97140 MANUAL THERAPY 1/> REGIONS: CPT | Performed by: PHYSICAL THERAPIST

## 2023-04-12 NOTE — PROGRESS NOTES
Dx: Essential hypertension (I10)  DDD (degenerative disc disease), cervical (M50.30)  Hyperlipidemia, unspecified hyperlipidemia type (E78.5)  Trigger point of neck (M54.2)  Cervical facet syndrome (M47.812)           Insurance (Authorized # of Visits):  15           Authorizing Physician: Dr. David Courtney MD visit: none scheduled  Fall Risk: standard         Precautions: n/a           Medication changes per patient? NO  Date of evaluation/PN: 3/16/2023  Pain rating: 3-4/10  Subjective: Reports having some neck and upper back pain after 8 hours of work today. Objective: Restored WNL lower cervical extension and improved thoracic PA mobility allowing active return to neutral posture but requires cueing to maintain. Assessment: Needs continued work on postural training and progression to load handling with focus on maintaining neutral posture to minimize tissue irritation at work. Goals: In progress as follows:  Raynaldo Bench will demonstrate improved postural awareness to reduce tissue overload and irritability. Raynaldo Bench will improve sleep hygiene with <2 episodes of waking due to pain. Raynaldo Bench will have improved proximal strength to 4+ to 5/5 to promote proximal stability and postural control and tolerance to sustained load handling. Raynaldo Bench will be (I) with a home program to allow discharge from PT towards further self-recovery and maintenance of function. Plan: Continue to progress to functional strengthening and load handling within tolerance of comorbidities.    Date: 3/20/2023  TX#: 2/12 Date: 3/27/2023                 TX#: 3/12 Date: 3/30/2023                TX#: 4/12 Date: 4/3/2023                 TX#: 5/12 Date:4/11/2023   Tx#: 6/12   THERAPEUTIC EX 23 mins 23 mins 38 mins 30 mins 30 mins   Scifit UE only focus on upright posture 4 mins ea fwd/retro 5 mins ea fwd/retro 5 mins ea fwd/retro 5 mins ea fwd/retro 5 mins ea fwd/retro   Doorway stretch A and 90/90 2 x 30 secs ea       Cervical retraction  Supine passive 3x10 Supine 3x10 AA Supine 5 x 10 AA  Supine 5 x 10 AA   Scapular retraction with ER Seated with foam roll b/w scap yellow tband 3x10       Prone V, W, horizontal abd 2 x 10 ea 2x10 ea 2x10 ea with 1lb dumbell thumb up/down with horizontal abd     Prone upper back ext 2 x 10 2x10 3x10     SB push up plus with walk up  3x10 RSB 2x10 GSB 3x10 GSB 3x10   Wall angels   3x10 Against 1/2 foam roll on wall 3x10 Against 1/2 foam roll on wall 3x10 Against 1/2 foam roll on wall 3x10   REIL   2x10     Seated thoracic ext   1/2 foam roll 2x10  1/2 foam roll 15 x 10 sec hold UE in 90/90   Lat pull down   Multi-pull 10lbs 2x10 Multi pull 15lbs 3x10 Multi pull 15lbs 3x10   Low rows    Multi pull 15lbs 3x10 Green tband 3x10   Standing flexion/scaption    90 with 1lb ea UE and back into 1/2 foam roll 2x10 ea    Chair push ups     2x10   Open book     2x10 ea                           NEUROMUSCULAR RE-EDUCATION    8 mins 8 mins   Standing with forehead on ball against wall V, W, horizontal abd    2x10 ea 3x10 ea           MANUAL TX 23 mins 23 min 15 mins 15 mins 15 mins   Cervical traction Supine manual x 5 mins Supine manual x 5 mins Supine manual x 5 mins Supine manual x 5 mins Supine manual x 5 mins   Joint mobilization Cervical sideglides/rotation x 10 mins Cervical sideglides/rotation x 10 mins Cervical SG/rot x 5 mins Cervical SG/rot x 5 mins Cervical SG/rot x 5 mins   STM  Paracervicals/UT/scalenes/levator x 8 mins Paracervicals/UT/scalenes/levator x 8 mins Paracervicals/UT/scalenes/levator x 5 mins Paracervicals/UT/scalenes/levator x 5 mins Paracervicals/UT/scalenes/levator x 5 mins   HEP: Continue initial HEP. Charges:  Therapeutic ex x 2; Neuromuscular re-education x 1; Manual Tx x 1       Total Timed Treatment: 53 min  Total Treatment Time: 53 min

## 2023-04-13 ENCOUNTER — OFFICE VISIT (OUTPATIENT)
Dept: PHYSICAL THERAPY | Age: 63
End: 2023-04-13
Attending: PHYSICAL MEDICINE & REHABILITATION
Payer: COMMERCIAL

## 2023-04-13 PROCEDURE — 97110 THERAPEUTIC EXERCISES: CPT | Performed by: PHYSICAL THERAPIST

## 2023-04-13 PROCEDURE — 97140 MANUAL THERAPY 1/> REGIONS: CPT | Performed by: PHYSICAL THERAPIST

## 2023-04-13 PROCEDURE — 97112 NEUROMUSCULAR REEDUCATION: CPT | Performed by: PHYSICAL THERAPIST

## 2023-04-13 NOTE — PROGRESS NOTES
Dx: Essential hypertension (I10)  DDD (degenerative disc disease), cervical (M50.30)  Hyperlipidemia, unspecified hyperlipidemia type (E78.5)  Trigger point of neck (M54.2)  Cervical facet syndrome (M47.812)           Insurance (Authorized # of Visits):  15           Authorizing Physician: Dr. Matias Courtney MD visit: none scheduled  Fall Risk: standard         Precautions: n/a           Medication changes per patient? NO  Date of evaluation/PN: 3/16/2023  Pain rating: 3-4/10  Subjective: Reports having central neck symptoms and feels very tired after work today. Objective: Fatigued quickly with cervical/scapular stabilization ex's resulting in loss of form/bracing. Assessment: Decreased functional endurance compromising ability to sustain proper alignment for painfree function with load handling and OH movements. Goals: In progress as follows:  Dorina Pond will demonstrate improved postural awareness to reduce tissue overload and irritability. Dorina Pond will improve sleep hygiene with <2 episodes of waking due to pain. Dorina Pond will have improved proximal strength to 4+ to 5/5 to promote proximal stability and postural control and tolerance to sustained load handling. Dorina Pond will be (I) with a home program to allow discharge from PT towards further self-recovery and maintenance of function. Plan: Continue to work on functional endurance with [facility] reaching/load handling.    Date: 3/20/2023  TX#: 2/12 Date: 3/27/2023                 TX#: 3/12 Date: 3/30/2023                TX#: 4/12 Date: 4/3/2023                 TX#: 5/12 Date:4/11/2023   Tx#: 6/12 Date:4/13/2023   Tx#: 7/12    THERAPEUTIC EX 23 mins 23 mins 38 mins 30 mins 30 mins 30 mins    Scifit UE only focus on upright posture 4 mins ea fwd/retro 5 mins ea fwd/retro 5 mins ea fwd/retro 5 mins ea fwd/retro 5 mins ea fwd/retro 5 mins ea fwd/retro    Doorway stretch A and 90/90 2 x 30 secs ea         Cervical retraction  Supine passive 3x10 Supine 3x10 AA Supine 5 x 10 AA  Supine 5 x 10 AA     Scapular retraction with ER Seated with foam roll b/w scap yellow tband 3x10         Prone V, W, horizontal abd 2 x 10 ea 2x10 ea 2x10 ea with 1lb dumbell thumb up/down with horizontal abd   3x10 ea    Prone upper back ext 2 x 10 2x10 3x10       SB push up plus with walk up  3x10 RSB 2x10 GSB 3x10 GSB 3x10 GSB 3x10    Wall angels   3x10 Against 1/2 foam roll on wall 3x10 Against 1/2 foam roll on wall 3x10 Against 1/2 foam roll on wall 3x10 Against 1/2 foam roll on wall 3x10    REIL   2x10       Seated thoracic ext   1/2 foam roll 2x10  1/2 foam roll 15 x 10 sec hold UE in 90/90 1/2 foam roll 15 x 10 sec hold UE in 90/90    Lat pull down   Multi-pull 10lbs 2x10 Multi pull 15lbs 3x10 Multi pull 15lbs 3x10 Multi pull 15lbs 3x10    Low rows    Multi pull 15lbs 3x10 Green tband 3x10     Standing flexion/scaption    90 with 1lb ea UE and back into 1/2 foam roll 2x10 ea      Chair push ups     2x10     Open book     2x10 ea 3x10 ea                                  NEUROMUSCULAR RE-EDUCATION    8 mins 8 mins 8 mins    Standing with forehead on ball against wall V, W, horizontal abd    2x10 ea 3x10 ea 1lb ea UE 2x10              MANUAL TX 23 mins 23 min 15 mins 15 mins 15 mins 15 mins    Cervical traction Supine manual x 5 mins Supine manual x 5 mins Supine manual x 5 mins Supine manual x 5 mins Supine manual x 5 mins Supine manual x 5 mins    Joint mobilization Cervical sideglides/rotation x 10 mins Cervical sideglides/rotation x 10 mins Cervical SG/rot x 5 mins Cervical SG/rot x 5 mins Cervical SG/rot x 5 mins Cervical SG/rot x 5 mins    STM  Paracervicals/UT/scalenes/levator x 8 mins Paracervicals/UT/scalenes/levator x 8 mins Paracervicals/UT/scalenes/levator x 5 mins Paracervicals/UT/scalenes/levator x 5 mins Paracervicals/UT/scalenes/levator x 5 mins Paracervicals/UT/scalenes/levator x 5 mins    HEP:       Charges:  Therapeutic ex x 2; Neuromuscular re-education x 1; Manual Tx x 1       Total Timed Treatment: 53 min  Total Treatment Time: 53 min

## 2023-04-17 ENCOUNTER — OFFICE VISIT (OUTPATIENT)
Dept: PHYSICAL THERAPY | Age: 63
End: 2023-04-17
Attending: PHYSICAL MEDICINE & REHABILITATION
Payer: COMMERCIAL

## 2023-04-17 PROCEDURE — 97112 NEUROMUSCULAR REEDUCATION: CPT | Performed by: PHYSICAL THERAPIST

## 2023-04-17 PROCEDURE — 97140 MANUAL THERAPY 1/> REGIONS: CPT | Performed by: PHYSICAL THERAPIST

## 2023-04-17 PROCEDURE — 97110 THERAPEUTIC EXERCISES: CPT | Performed by: PHYSICAL THERAPIST

## 2023-04-17 NOTE — PROGRESS NOTES
ProgressSummary  Pt has attended 8 visits in Physical Therapy. Dx: Essential hypertension (I10)  DDD (degenerative disc disease), cervical (M50.30)  Hyperlipidemia, unspecified hyperlipidemia type (E78.5)  Trigger point of neck (M54.2)  Cervical facet syndrome (M47.812)           Insurance (Authorized # of Visits):  15           Authorizing Physician: Dr. Uma Gaston  Next MD visit: none scheduled  Fall Risk: standard         Precautions: n/a           Medication changes per patient? NO  Date of evaluation/PN: 3/16/2023  Pain ratin-210  Assessment:   Luda Wilson reports improved symptoms have allowed her to continue with her difficulty job tasks with frequent stooping, lifting and carrying with decreased aggravation of her symptoms. She has demonstrated improved postural awareness and decreased progression of her thoracic kyphosis. 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 Subjective: Decreased pain today despite working very long hours this past weekend. Objective: WNL active CROM with ERP with lower cervical extension; (B)UE strength grossly 4+ to 5/5      Goals: In progress as follows:  Luda Wilson will demonstrate improved postural awareness to reduce tissue overload and irritability. MET  Luda Wilson will improve sleep hygiene with <2 episodes of waking due to pain. Luda Wilson will have improved proximal strength to 4+ to 5/5 to promote proximal stability and postural control and tolerance to sustained load handling. Luda Wilson will be (I) with a home program to allow discharge from PT towards further self-recovery and maintenance of function. Plan: Continue to work on functional endurance with [facility] reaching/load handling.    Date: 3/30/2023 TX#: 4/12 Date: 4/3/2023                 TX#: 5/12 Date:4/11/2023   Tx#: 6/12 Date:4/13/2023   Tx#: 7/12 Date:4/17/2023   Tx#: 8/12    THERAPEUTIC EX 38 mins 30 mins 30 mins 30 mins 30 mins    Scifit UE only focus on upright posture 5 mins ea fwd/retro 5 mins ea fwd/retro 5 mins ea fwd/retro 5 mins ea fwd/retro 5 mins ea fwd/retro    Doorway stretch         Cervical retraction  Supine 5 x 10 AA  Supine 5 x 10 AA      Scapular retraction with ER         Prone V, W, horizontal abd 2x10 ea with 1lb dumbell thumb up/down with horizontal abd   3x10 ea     Prone upper back ext 3x10        SB push up plus with walk up RSB 2x10 GSB 3x10 GSB 3x10 GSB 3x10 GSB 3x10    Wall angels  Against 1/2 foam roll on wall 3x10 Against 1/2 foam roll on wall 3x10 Against 1/2 foam roll on wall 3x10 Against 1/2 foam roll on wall 3x10     REIL 2x10        Seated thoracic ext 1/2 foam roll 2x10  1/2 foam roll 15 x 10 sec hold UE in 90/90 1/2 foam roll 15 x 10 sec hold UE in 90/90     Lat pull down Multi-pull 10lbs 2x10 Multi pull 15lbs 3x10 Multi pull 15lbs 3x10 Multi pull 15lbs 3x10 Multi pull 15lbs 3x10    Low rows  Multi pull 15lbs 3x10 Green tband 3x10  Multi pull 15lbs 3x1-0    Standing flexion/scaption  90 with 1lb ea UE and back into 1/2 foam roll 2x10 ea       Chair push ups   2x10  3x10    Open book   2x10 ea 3x10 ea 3x10 ea red tband    scarecrows     Yellow tband 2x10                      NEUROMUSCULAR RE-EDUCATION  8 mins 8 mins 8 mins 8 mins    Standing with forehead on ball against wall V, W, horizontal abd  2x10 ea 3x10 ea 1lb ea UE 2x10 1lb ea UE 2x10             MANUAL TX 15 mins 15 mins 15 mins 15 mins 15 mins    Cervical traction Supine manual x 5 mins Supine manual x 5 mins Supine manual x 5 mins Supine manual x 5 mins Supine manual x 5 mins    Joint mobilization Cervical SG/rot x 5 mins Cervical SG/rot x 5 mins Cervical SG/rot x 5 mins Cervical SG/rot x 5 mins Cervical SG/rot x 5 mins    STM Paracervicals/UT/scalenes/levator x 5 mins Paracervicals/UT/scalenes/levator x 5 mins Paracervicals/UT/scalenes/levator x 5 mins Paracervicals/UT/scalenes/levator x 5 mins Paracervicals/UT/scalenes/levator x 5 mins    HEP: Continue current HEP. Charges: Therapeutic ex x 2; Neuromuscular re-education x 1; Manual Tx x 1       Total Timed Treatment: 53 min  Total Treatment Time: 53 min    Neck Disability Index Score  Score: 40 % (3/16/2023  4:35 PM)  Post Neck Disability Index Score  Pending patient completion    Plan: Continue skilled Physical Therapy 2 x/week or a total of 12 visits over a 90 day period per initial POC. Thank you for your referral. If you have any questions, please contact me at Dept: [phone].     Sincerely,  Electronically signed by therapist: Tracy Ro PT

## 2023-04-20 ENCOUNTER — LAB ENCOUNTER (OUTPATIENT)
Dept: LAB | Age: 63
End: 2023-04-20
Attending: NURSE PRACTITIONER
Payer: COMMERCIAL

## 2023-04-20 ENCOUNTER — OFFICE VISIT (OUTPATIENT)
Dept: PHYSICAL THERAPY | Age: 63
End: 2023-04-20
Attending: PHYSICAL MEDICINE & REHABILITATION
Payer: COMMERCIAL

## 2023-04-20 ENCOUNTER — OFFICE VISIT (OUTPATIENT)
Dept: FAMILY MEDICINE CLINIC | Facility: CLINIC | Age: 63
End: 2023-04-20

## 2023-04-20 VITALS
DIASTOLIC BLOOD PRESSURE: 77 MMHG | HEIGHT: 65 IN | HEART RATE: 66 BPM | SYSTOLIC BLOOD PRESSURE: 128 MMHG | BODY MASS INDEX: 23.32 KG/M2 | WEIGHT: 140 LBS

## 2023-04-20 DIAGNOSIS — E03.8 HYPOTHYROIDISM DUE TO HASHIMOTO'S THYROIDITIS: ICD-10-CM

## 2023-04-20 DIAGNOSIS — M54.41 CHRONIC BILATERAL LOW BACK PAIN WITH RIGHT-SIDED SCIATICA: ICD-10-CM

## 2023-04-20 DIAGNOSIS — I10 PRIMARY HYPERTENSION: ICD-10-CM

## 2023-04-20 DIAGNOSIS — G47.9 SLEEP DISTURBANCE: ICD-10-CM

## 2023-04-20 DIAGNOSIS — E55.9 VITAMIN D DEFICIENCY: ICD-10-CM

## 2023-04-20 DIAGNOSIS — E06.3 HYPOTHYROIDISM DUE TO HASHIMOTO'S THYROIDITIS: ICD-10-CM

## 2023-04-20 DIAGNOSIS — M54.2 NECK PAIN: Primary | ICD-10-CM

## 2023-04-20 DIAGNOSIS — E78.2 MIXED HYPERLIPIDEMIA: ICD-10-CM

## 2023-04-20 DIAGNOSIS — G89.29 CHRONIC BILATERAL LOW BACK PAIN WITH RIGHT-SIDED SCIATICA: ICD-10-CM

## 2023-04-20 LAB
ALBUMIN SERPL-MCNC: 4.4 G/DL (ref 3.4–5)
ALBUMIN/GLOB SERPL: 1.8 {RATIO} (ref 1–2)
ALP LIVER SERPL-CCNC: 82 U/L
ALT SERPL-CCNC: 34 U/L
ANION GAP SERPL CALC-SCNC: 8 MMOL/L (ref 0–18)
AST SERPL-CCNC: 20 U/L (ref 15–37)
BILIRUB SERPL-MCNC: 0.3 MG/DL (ref 0.1–2)
BUN BLD-MCNC: 18 MG/DL (ref 7–18)
BUN/CREAT SERPL: 21.7 (ref 10–20)
CALCIUM BLD-MCNC: 9.4 MG/DL (ref 8.5–10.1)
CHLORIDE SERPL-SCNC: 109 MMOL/L (ref 98–112)
CHOLEST SERPL-MCNC: 147 MG/DL (ref ?–200)
CO2 SERPL-SCNC: 29 MMOL/L (ref 21–32)
CREAT BLD-MCNC: 0.83 MG/DL
EST. AVERAGE GLUCOSE BLD GHB EST-MCNC: 123 MG/DL (ref 68–126)
FASTING PATIENT LIPID ANSWER: NO
FASTING STATUS PATIENT QL REPORTED: NO
GFR SERPLBLD BASED ON 1.73 SQ M-ARVRAT: 80 ML/MIN/1.73M2 (ref 60–?)
GLOBULIN PLAS-MCNC: 2.5 G/DL (ref 2.8–4.4)
GLUCOSE BLD-MCNC: 96 MG/DL (ref 70–99)
HBA1C MFR BLD: 5.9 % (ref ?–5.7)
HDLC SERPL-MCNC: 60 MG/DL (ref 40–59)
LDLC SERPL CALC-MCNC: 62 MG/DL (ref ?–100)
NONHDLC SERPL-MCNC: 87 MG/DL (ref ?–130)
OSMOLALITY SERPL CALC.SUM OF ELEC: 304 MOSM/KG (ref 275–295)
POTASSIUM SERPL-SCNC: 4.4 MMOL/L (ref 3.5–5.1)
PROT SERPL-MCNC: 6.9 G/DL (ref 6.4–8.2)
SODIUM SERPL-SCNC: 146 MMOL/L (ref 136–145)
TRIGL SERPL-MCNC: 147 MG/DL (ref 30–149)
TSI SER-ACNC: 1.59 MIU/ML (ref 0.36–3.74)
VIT D+METAB SERPL-MCNC: 26.6 NG/ML (ref 30–100)
VLDLC SERPL CALC-MCNC: 22 MG/DL (ref 0–30)

## 2023-04-20 PROCEDURE — 97110 THERAPEUTIC EXERCISES: CPT | Performed by: PHYSICAL THERAPIST

## 2023-04-20 PROCEDURE — 80061 LIPID PANEL: CPT

## 2023-04-20 PROCEDURE — 36415 COLL VENOUS BLD VENIPUNCTURE: CPT | Performed by: NURSE PRACTITIONER

## 2023-04-20 PROCEDURE — 84443 ASSAY THYROID STIM HORMONE: CPT

## 2023-04-20 PROCEDURE — 82306 VITAMIN D 25 HYDROXY: CPT | Performed by: NURSE PRACTITIONER

## 2023-04-20 PROCEDURE — 3078F DIAST BP <80 MM HG: CPT | Performed by: NURSE PRACTITIONER

## 2023-04-20 PROCEDURE — 80053 COMPREHEN METABOLIC PANEL: CPT

## 2023-04-20 PROCEDURE — 97140 MANUAL THERAPY 1/> REGIONS: CPT | Performed by: PHYSICAL THERAPIST

## 2023-04-20 PROCEDURE — 83036 HEMOGLOBIN GLYCOSYLATED A1C: CPT

## 2023-04-20 PROCEDURE — 97112 NEUROMUSCULAR REEDUCATION: CPT | Performed by: PHYSICAL THERAPIST

## 2023-04-20 PROCEDURE — 3074F SYST BP LT 130 MM HG: CPT | Performed by: NURSE PRACTITIONER

## 2023-04-20 PROCEDURE — 99213 OFFICE O/P EST LOW 20 MIN: CPT | Performed by: NURSE PRACTITIONER

## 2023-04-20 PROCEDURE — 3008F BODY MASS INDEX DOCD: CPT | Performed by: NURSE PRACTITIONER

## 2023-04-20 NOTE — PROGRESS NOTES
Dx: Essential hypertension (I10)  DDD (degenerative disc disease), cervical (M50.30)  Hyperlipidemia, unspecified hyperlipidemia type (E78.5)  Trigger point of neck (M54.2)  Cervical facet syndrome (M47.812)           Insurance (Authorized # of Visits):  15           Authorizing Physician: Dr. Samuel Courtney MD visit: none scheduled  Fall Risk: standard         Precautions: n/a           Medication changes per patient? NO  Date of evaluation/PN: 3/16/2023  Pain ratin  Subjective: Reports feeling OK today with taking her anti-inflammatory medications before work in the morning. Objective: (B)UE strength grossly 4+ to 5/5 and demonstrating tolerance to [facility] load handling without symptom provocation. Assessment:   Meeting goals with return to tolerating load handling with improved biomechanical alignment. Goals: In progress as follows:  Neisha Muller will demonstrate improved postural awareness to reduce tissue overload and irritability. MET  Neisha Kleinquet will improve sleep hygiene with <2 episodes of waking due to pain. Neisha Muller will have improved proximal strength to 4+ to 5/5 to promote proximal stability and postural control and tolerance to sustained load handling. Neisha Muller will be (I) with a home program to allow discharge from PT towards further self-recovery and maintenance of function.     Treatment:   Date: 4/3/2023                 TX#:  Date:2023   Tx#:  Date:2023   Tx#:  Date:2023   Tx#:  Date:2023   Tx#:    THERAPEUTIC EX 30 mins 30 mins 30 mins 30 mins 30 mins   Scifit UE only focus on upright posture 5 mins ea fwd/retro 5 mins ea fwd/retro 5 mins ea fwd/retro 5 mins ea fwd/retro 5 mins ea fwd/retro   (B) shoulder flexion/scaption     Standing against 1/2 foam roll 3x10 ea 1lb   Cervical retraction   Supine 5 x 10 AA      Curl to press     1lb standing against foam roll 3x10   Prone V, W, horizontal abd   3x10 ea     Prone upper back ext        SB push up plus with walk up GSB 3x10 GSB 3x10 GSB 3x10 GSB 3x10 GSB 3x10 with alt/opp UE/LE lifts   Wall angels  Against 1/2 foam roll on wall 3x10 Against 1/2 foam roll on wall 3x10 Against 1/2 foam roll on wall 3x10  Against 1/2 foam roll on wall 3x10 1lb   REIL        Seated thoracic ext  1/2 foam roll 15 x 10 sec hold UE in 90/90 1/2 foam roll 15 x 10 sec hold UE in 90/90     Lat pull down Multi pull 15lbs 3x10 Multi pull 15lbs 3x10 Multi pull 15lbs 3x10 Multi pull 15lbs 3x10    Low rows Multi pull 15lbs 3x10 Green tband 3x10  Multi pull 15lbs 3x1-0    Standing flexion/scaption 90 with 1lb ea UE and back into 1/2 foam roll 2x10 ea       Chair push ups  2x10  3x10    Open book  2x10 ea 3x10 ea 3x10 ea red tband 3x10 red tband   scarecrows    Yellow tband 2x10                    NEUROMUSCULAR RE-EDUCATION 8 mins 8 mins 8 mins 8 mins 8 mins   Standing with forehead on ball against wall V, W, horizontal abd 2x10 ea 3x10 ea 1lb ea UE 2x10 1lb ea UE 2x10 1lb ea UE 3x10           MANUAL TX 15 mins 15 mins 15 mins 15 mins 15 mins   Cervical traction Supine manual x 5 mins Supine manual x 5 mins Supine manual x 5 mins Supine manual x 5 mins Supine manual x 5 mins   Joint mobilization Cervical SG/rot x 5 mins Cervical SG/rot x 5 mins Cervical SG/rot x 5 mins Cervical SG/rot x 5 mins Cervical SG/rot x 5 mins   STM  Paracervicals/UT/scalenes/levator x 5 mins Paracervicals/UT/scalenes/levator x 5 mins Paracervicals/UT/scalenes/levator x 5 mins Paracervicals/UT/scalenes/levator x 5 mins Paracervicals/UT/scalenes/levator x 5 mins   HEP: Continue current HEP. Charges: Therapeutic ex x 2; Neuromuscular re-education x 1; Manual Tx x 1       Total Timed Treatment: 53 min  Total Treatment Time: 53 min    Plan: Progress HEP to including load handling in absence of latent tissue irritability.

## 2023-04-21 NOTE — ASSESSMENT & PLAN NOTE
Pt has stopped htn meds  Continue to check bp 3-4 times per week  Call if bp is consistently > 140/86

## 2023-04-21 NOTE — ASSESSMENT & PLAN NOTE
Continue present management  Please aim to eat a diet high in fresh fruits and vegetables, lean protein sources, complex carbohydrates and limited processed and fast foods. Try to get at least 150 minutes of exercise per week-a combination of weight resistance and cardio is preferred.

## 2023-04-24 ENCOUNTER — APPOINTMENT (OUTPATIENT)
Dept: PHYSICAL THERAPY | Age: 63
End: 2023-04-24
Attending: PHYSICAL MEDICINE & REHABILITATION
Payer: COMMERCIAL

## 2023-04-25 ENCOUNTER — APPOINTMENT (OUTPATIENT)
Dept: PHYSICAL THERAPY | Age: 63
End: 2023-04-25
Attending: PHYSICAL MEDICINE & REHABILITATION
Payer: COMMERCIAL

## 2023-04-27 ENCOUNTER — OFFICE VISIT (OUTPATIENT)
Dept: PHYSICAL THERAPY | Age: 63
End: 2023-04-27
Attending: PHYSICAL MEDICINE & REHABILITATION
Payer: COMMERCIAL

## 2023-04-27 PROCEDURE — 97140 MANUAL THERAPY 1/> REGIONS: CPT | Performed by: PHYSICAL THERAPIST

## 2023-04-27 PROCEDURE — 97110 THERAPEUTIC EXERCISES: CPT | Performed by: PHYSICAL THERAPIST

## 2023-04-27 NOTE — PROGRESS NOTES
Dx: Essential hypertension (I10)  DDD (degenerative disc disease), cervical (M50.30)  Hyperlipidemia, unspecified hyperlipidemia type (E78.5)  Trigger point of neck (M54.2)  Cervical facet syndrome (M47.812)           Insurance (Authorized # of Visits):  15           Authorizing Physician: Dr. Trenna Barthel Next MD visit: none scheduled  Fall Risk: standard         Precautions: n/a           Medication changes per patient? NO  Date of evaluation/PN: 2023  Pain ratin  Subjective: No pain but feels more tired than usual today and attributes feeling like this to the weather    Objective: Decreased program intensity due to overall sensation of fatigue limiting tolerance to active exercise progression. Assessment:   Managing symptoms well but likely comorbidities influencing tolerance to exercise progression. Goals: In progress as follows:  Rosales Diaz will demonstrate improved postural awareness to reduce tissue overload and irritability. MET  Rosales Diaz will improve sleep hygiene with <2 episodes of waking due to pain. Rosales Diaz will have improved proximal strength to 4+ to 5/5 to promote proximal stability and postural control and tolerance to sustained load handling. Rosales Diaz will be (I) with a home program to allow discharge from PT towards further self-recovery and maintenance of function.     Treatment:   Date:2023   Tx#:  Date:2023   Tx#:  Date:2023   Tx#:  Date:2023   Tx#:  Date:2023   Tx#: 10/12     THERAPEUTIC EX 30 mins 30 mins 30 mins 30 mins 30 mins     Scifit UE only focus on upright posture 5 mins ea fwd/retro 5 mins ea fwd/retro 5 mins ea fwd/retro 5 mins ea fwd/retro 5 mins ea fwd/retro     (B) shoulder flexion/scaption    Standing against 1/2 foam roll 3x10 ea 1lb      Cervical retraction  Supine 5 x 10 AA         Curl to press    1lb standing against foam roll 3x10      Prone V, W, horizontal abd  3x10 ea   3x10 ea     Prone upper back ext          SB push up plus with walk up GSB 3x10 GSB 3x10 GSB 3x10 GSB 3x10 with alt/opp UE/LE lifts GSB 3x10 with alt/opp UE/LE lifts     Wall angels  Against 1/2 foam roll on wall 3x10 Against 1/2 foam roll on wall 3x10  Against 1/2 foam roll on wall 3x10 1lb      REIL          Seated thoracic ext 1/2 foam roll 15 x 10 sec hold UE in 90/90 1/2 foam roll 15 x 10 sec hold UE in 90/90        Lat pull down Multi pull 15lbs 3x10 Multi pull 15lbs 3x10 Multi pull 15lbs 3x10       Low rows Green tband 3x10  Multi pull 15lbs 3x1-0       Standing flexion/scaption          Chair push ups 2x10  3x10       Open book 2x10 ea 3x10 ea 3x10 ea red tband 3x10 red tband 3x10 red tband     scarecrows   Yellow tband 2x10                           NEUROMUSCULAR RE-EDUCATION 8 mins 8 mins 8 mins 8 mins      Standing with forehead on ball against wall V, W, horizontal abd 3x10 ea 1lb ea UE 2x10 1lb ea UE 2x10 1lb ea UE 3x10                MANUAL TX 15 mins 15 mins 15 mins 15 mins 15 mins     Cervical traction Supine manual x 5 mins Supine manual x 5 mins Supine manual x 5 mins Supine manual x 5 mins Supine manual x 5 mins     Joint mobilization Cervical SG/rot x 5 mins Cervical SG/rot x 5 mins Cervical SG/rot x 5 mins Cervical SG/rot x 5 mins Cervical SG/rot x 5 mins     STM  Paracervicals/UT/scalenes/levator x 5 mins Paracervicals/UT/scalenes/levator x 5 mins Paracervicals/UT/scalenes/levator x 5 mins Paracervicals/UT/scalenes/levator x 5 mins Paracervicals/UT/scalenes/levator x 5 mins     HEP: Continue current HEP. Charges: Therapeutic ex x 2; Manual Tx x 1       Total Timed Treatment: 45 min  Total Treatment Time: 45 min    Plan: Progress HEP as tolerated.

## 2023-05-01 ENCOUNTER — OFFICE VISIT (OUTPATIENT)
Dept: PHYSICAL THERAPY | Age: 63
End: 2023-05-01
Attending: PHYSICAL MEDICINE & REHABILITATION
Payer: COMMERCIAL

## 2023-05-01 PROCEDURE — 97140 MANUAL THERAPY 1/> REGIONS: CPT | Performed by: PHYSICAL THERAPIST

## 2023-05-01 PROCEDURE — 97110 THERAPEUTIC EXERCISES: CPT | Performed by: PHYSICAL THERAPIST

## 2023-05-01 NOTE — PROGRESS NOTES
Dx: Essential hypertension (I10)  DDD (degenerative disc disease), cervical (M50.30)  Hyperlipidemia, unspecified hyperlipidemia type (E78.5)  Trigger point of neck (M54.2)  Cervical facet syndrome (M47.812)           Insurance (Authorized # of Visits):  15           Authorizing Physician: Dr. Therese Courtney MD visit: none scheduled  Fall Risk: standard         Precautions: n/a           Medication changes per patient? NO  Date of evaluation/PN: 2023  Pain ratin  Subjective: Feels better this session and not as tired despite coming straight from work. Objective: Tolerating return to load handling without symptom provocation. Assessment:   Progressing well towards goals with improving strength allowing load handling without signs of tissue irritability. Goals: In progress as follows:  Sera Mask will demonstrate improved postural awareness to reduce tissue overload and irritability. MET  [place] Mask will improve sleep hygiene with <2 episodes of waking due to pain. Sera Mask will have improved proximal strength to 4+ to 5/5 to promote proximal stability and postural control and tolerance to sustained load handling. [place] Mask will be (I) with a home program to allow discharge from PT towards further self-recovery and maintenance of function.     Treatment:   Date:2023   Tx#:  Date:2023   Tx#:  Date:2023   Tx#:  Date:2023   Tx#:  Date:2023   Tx#: 10/12 Date:2023   Tx#:     THERAPEUTIC EX 30 mins 30 mins 30 mins 30 mins 30 mins 38 mins    Scifit UE only focus on upright posture 5 mins ea fwd/retro 5 mins ea fwd/retro 5 mins ea fwd/retro 5 mins ea fwd/retro 5 mins ea fwd/retro 5 mins ea fwd/retro    (B) shoulder flexion/scaption    Standing against 1/2 foam roll 3x10 ea 1lb  Standing against 1/2 foam roll 3x10 ea 2lb    Cervical retraction  Supine 5 x 10 AA         Curl to press    1lb standing against foam roll 3x10      Prone V, W, horizontal abd  3x10 ea   3x10 ea 3x10 ea    Prone upper back ext          SB push up plus with walk up GSB 3x10 GSB 3x10 GSB 3x10 GSB 3x10 with alt/opp UE/LE lifts GSB 3x10 with alt/opp UE/LE lifts GSB 3x10 with alt/opp UE/LE lifts    Wall angels  Against 1/2 foam roll on wall 3x10 Against 1/2 foam roll on wall 3x10  Against 1/2 foam roll on wall 3x10 1lb  Against 1/2 foam roll on wall 3x10 1lb    REIL          Seated thoracic ext 1/2 foam roll 15 x 10 sec hold UE in 90/90 1/2 foam roll 15 x 10 sec hold UE in 90/90        Lat pull down Multi pull 15lbs 3x10 Multi pull 15lbs 3x10 Multi pull 15lbs 3x10   Multi pull 15lbs 3x10    Low rows Green tband 3x10  Multi pull 15lbs 3x1-0   Multi pull 15lbs 3x10    Standing flexion/scaption          Chair push ups 2x10  3x10       Open book 2x10 ea 3x10 ea 3x10 ea red tband 3x10 red tband 3x10 red tband     scarecrows   Yellow tband 2x10                           NEUROMUSCULAR RE-EDUCATION 8 mins 8 mins 8 mins 8 mins      Standing with forehead on ball against wall V, W, horizontal abd 3x10 ea 1lb ea UE 2x10 1lb ea UE 2x10 1lb ea UE 3x10                MANUAL TX 15 mins 15 mins 15 mins 15 mins 15 mins 8 mins    Cervical traction Supine manual x 5 mins Supine manual x 5 mins Supine manual x 5 mins Supine manual x 5 mins Supine manual x 5 mins Supine manual x 3 mins    Joint mobilization Cervical SG/rot x 5 mins Cervical SG/rot x 5 mins Cervical SG/rot x 5 mins Cervical SG/rot x 5 mins Cervical SG/rot x 5 mins     STM  Paracervicals/UT/scalenes/levator x 5 mins Paracervicals/UT/scalenes/levator x 5 mins Paracervicals/UT/scalenes/levator x 5 mins Paracervicals/UT/scalenes/levator x 5 mins Paracervicals/UT/scalenes/levator x 5 mins Paracervicals/UT/scalenes/levator x 5 mins    HEP: Continue current HEP. Charges: Therapeutic ex x 3; Manual Tx x 1       Total Timed Treatment: 46 min  Total Treatment Time: 46 min    Plan: Continue x 1 session to finalize HEP then DC with (I) HEP.

## 2023-05-17 ENCOUNTER — OFFICE VISIT (OUTPATIENT)
Dept: PHYSICAL THERAPY | Age: 63
End: 2023-05-17
Attending: PHYSICAL MEDICINE & REHABILITATION
Payer: COMMERCIAL

## 2023-05-17 PROCEDURE — 97530 THERAPEUTIC ACTIVITIES: CPT | Performed by: PHYSICAL THERAPIST

## 2023-05-17 PROCEDURE — 97110 THERAPEUTIC EXERCISES: CPT | Performed by: PHYSICAL THERAPIST

## 2023-05-17 NOTE — PROGRESS NOTES
Discharge Summary  Pt has attended 12 visits in Physical Therapy. Dx: Essential hypertension (I10)  DDD (degenerative disc disease), cervical (M50.30)  Hyperlipidemia, unspecified hyperlipidemia type (E78.5)  Trigger point of neck (M54.2)  Cervical facet syndrome (M47.812)           Insurance (Authorized # of Visits):  15           Authorizing Physician: Dr. Trenna Barthel  Next MD visit: none scheduled  Fall Risk: standard         Precautions: n/a           Medication changes per patient? NO  Date of evaluation/PN: 2023  Pain ratin  Assessment:   Rosales Diaz has now completed her course of physical therapy achieving good self-management of her symptoms with her home program. 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 Her primary c/o at this time is more her lower back which is also likely due to the frequent bending and lifting involved in her job PDL. She has improved in her UE and proximal stability and strength to allow functional use of her UE's for [facility] reaching and load handling without symptom provocation but her load handling ability is affected by her comorbidities specifically her LBP. Subjective: Neck and upper back has been better but LBP continues to be now her primary issue. Objective: cervical and scapular stabilizers along with UE's now grossly 4+ to 5/5 and demonstrates ability to handle 5-6lbs load overhead without symptom provocation; progression limited by comorbidities. Goals: MET as follows:  Rosales Diaz will demonstrate improved postural awareness to reduce tissue overload and irritability. MET  Rosales Diaz will improve sleep hygiene with <2 episodes of waking due to pain. MET  Rosales Diaz will have improved proximal strength to 4+ to 5/5 to promote proximal stability and postural control and tolerance to sustained load handling.  MET  Rosales Diaz will be (I) with a home program to allow discharge from PT towards further self-recovery and maintenance of function.  MET    Treatment:   Date:4/13/2023   Tx#: 7/12 Date:4/17/2023   Tx#: 8/12 Date:4/20/2023   Tx#: 9/12 Date:4/27/2023   Tx#: 10/12 Date:5/1/2023   Tx#: 11/12 Date:5/17/2023   Tx#: 12/12   THERAPEUTIC EX 30 mins 30 mins 30 mins 30 mins 38 mins 38 mins   Scifit UE only focus on upright posture 5 mins ea fwd/retro 5 mins ea fwd/retro 5 mins ea fwd/retro 5 mins ea fwd/retro 5 mins ea fwd/retro 5' ea fwd/retro   (B) shoulder flexion/scaption   Standing against 1/2 foam roll 3x10 ea 1lb  Standing against 1/2 foam roll 3x10 ea 2lb Standing against 1/2 foam roll 3x10 ea 2lb   Cervical retraction          Curl to press   1lb standing against foam roll 3x10   Standing against 1/2 foam roll 3x10 2lb   Prone V, W, horizontal abd 3x10 ea   3x10 ea 3x10 ea    Prone upper back ext         SB push up plus with walk up GSB 3x10 GSB 3x10 GSB 3x10 with alt/opp UE/LE lifts GSB 3x10 with alt/opp UE/LE lifts GSB 3x10 with alt/opp UE/LE lifts GSB 3x10 with alt/opp UE/LE lifts   Wall angels  Against 1/2 foam roll on wall 3x10  Against 1/2 foam roll on wall 3x10 1lb  Against 1/2 foam roll on wall 3x10 1lb Against 1/2 foam roll on wall 3x10 2lb   REIL         Seated thoracic ext 1/2 foam roll 15 x 10 sec hold UE in 90/90        Lat pull down Multi pull 15lbs 3x10 Multi pull 15lbs 3x10   Multi pull 15lbs 3x10 Multi pull 15lbs 3x10   Low rows  Multi pull 15lbs 3x1-0   Multi pull 15lbs 3x10 Multi pull 15lbs 3x10   Standing flexion/scaption         Chair push ups  3x10       Open book 3x10 ea 3x10 ea red tband 3x10 red tband 3x10 red tband     scarecrows  Yellow tband 2x10                                  THERAPEUTIC ACTIVITY      8 mins   Cable retrowalk      10lbs 3x10   woodchops      Multi pull 10lbs 3x10 ea   Reverse woodchops      0 cable wt x 10 ea (R)/(L)   NEUROMUSCULAR RE-EDUCATION 8 mins 8 mins 8 mins      Standing with forehead on ball against wall V, W, horizontal abd 1lb ea UE 2x10 1lb ea UE 2x10 1lb ea UE 3x10               MANUAL TX 15 mins 15 mins 15 mins 15 mins 8 mins    Cervical traction Supine manual x 5 mins Supine manual x 5 mins Supine manual x 5 mins Supine manual x 5 mins Supine manual x 3 mins    Joint mobilization Cervical SG/rot x 5 mins Cervical SG/rot x 5 mins Cervical SG/rot x 5 mins Cervical SG/rot x 5 mins     STM  Paracervicals/UT/scalenes/levator x 5 mins Paracervicals/UT/scalenes/levator x 5 mins Paracervicals/UT/scalenes/levator x 5 mins Paracervicals/UT/scalenes/levator x 5 mins Paracervicals/UT/scalenes/levator x 5 mins    HEP: Refer to patient instruction. Charges: Therapeutic ex x 3; Therapeutic activity x 1       Total Timed Treatment: 46 min  Total Treatment Time: 46 min  Neck Disability Index Score  Score: 40 % (3/16/2023  4:35 PM)  Unable to complete discharge questionnaire due to language barrier. Plan: Discharge with (I) HEP. Thank you for your referral. If you have any questions, please contact me at Dept: [phone].     Sincerely,  Electronically signed by therapist: Niurka Linn, PT

## 2024-01-04 ENCOUNTER — HOSPITAL ENCOUNTER (OUTPATIENT)
Age: 64
Discharge: HOME | End: 2024-01-04
Payer: COMMERCIAL

## 2024-01-04 VITALS
OXYGEN SATURATION: 97 % | RESPIRATION RATE: 16 BRPM | SYSTOLIC BLOOD PRESSURE: 145 MMHG | TEMPERATURE: 98 F | HEART RATE: 80 BPM | DIASTOLIC BLOOD PRESSURE: 74 MMHG

## 2024-01-04 DIAGNOSIS — Z76.0 ENCOUNTER FOR MEDICATION REFILL: ICD-10-CM

## 2024-01-04 DIAGNOSIS — U07.1 COVID-19: Primary | ICD-10-CM

## 2024-01-04 LAB — SARS-COV-2 RNA RESP QL NAA+PROBE: DETECTED

## 2024-01-04 PROCEDURE — U0002 COVID-19 LAB TEST NON-CDC: HCPCS | Performed by: PHYSICIAN ASSISTANT

## 2024-01-04 PROCEDURE — 99213 OFFICE O/P EST LOW 20 MIN: CPT | Performed by: PHYSICIAN ASSISTANT

## 2024-01-04 RX ORDER — BENZONATATE 200 MG/1
200 CAPSULE ORAL 3 TIMES DAILY PRN
Qty: 20 CAPSULE | Refills: 0 | Status: SHIPPED | OUTPATIENT
Start: 2024-01-04

## 2024-01-04 RX ORDER — LEVOTHYROXINE SODIUM 0.07 MG/1
75 TABLET ORAL
Qty: 14 TABLET | Refills: 0 | Status: SHIPPED | OUTPATIENT
Start: 2024-01-04 | End: 2024-01-18

## 2024-01-04 NOTE — DISCHARGE INSTRUCTIONS
Complete entire course of Paxlovid for covid infection as directed   STOP taking rosuvastatin for 7 days while taking Paxlovid   Benzonatate for cough as needed up to 3 times daily     Alternate Tylenol/Motrin every 3 hours as needed for pain or fever > 100.4 degrees   Drink plenty of fluids  Get plenty of rest    Wash hands often  Disinfect your environment  Do not share utensils or drinks    You may benefit from taking a decongestant (e.g. Sudafed)  You may benefit from taking a daily allergy medication (e.g. Zyrtec)  You may benefit from using a humidifier    Stay home and isolate from others in your home for 5 days (starting when your symptoms began)  Isolation can end after 5 days if you are fever-free (<100.4) for 24 hours and your symptoms are improving  Wear a mask any time you are around others for 10 days    Symptoms may take a few weeks to resolve  Follow up with your primary care provider

## 2024-01-04 NOTE — ED PROVIDER NOTES
Chief Complaint   Patient presents with    Sinus Problem       History obtained from: patient   services not used    HPI:     Merary Mckeon is a 63 year old female who presents with general illness symptoms x 3 days.  Patient endorses cough, sore throat, nasal congestion, headache, body aches, and generalized fatigue.  Patient has been taking OTC cold medicine at home without relief.  Patient states she was recently in [place] visiting family and was informed that a family member tested positive for COVID.  Patient took a COVID test at home which was negative.  Denies fevers, chills, chest pain, shortness of breath, wheezing, abdominal pain, vomiting, rash.    Patient also requesting refill for levothyroxine.  Patient states she has only 2 pills left but cannot get into see her PCP until next week.    PMH  Past Medical History:   Diagnosis Date    Disorder of thyroid     hypothyroid    Visual impairment     reading glasses       PFSH    PFSH asessment screens reviewed and agree.  Nurses notes reviewed I agree with documentation.    Family History   Problem Relation Age of Onset    Cancer Father         thyroid cancer    Diabetes Mother     Breast Cancer Mother 60    Breast Cancer Sister 50     Family history reviewed with patient/caregiver and is not pertinent to presenting problem.  Social History     Socioeconomic History    Marital status: Single     Spouse name: Not on file    Number of children: Not on file    Years of education: Not on file    Highest education level: Not on file   Occupational History    Not on file   Tobacco Use    Smoking status: Former     Types: Cigarettes    Smokeless tobacco: Never    Tobacco comments:     Social smoking   Vaping Use    Vaping Use: Never used   Substance and Sexual Activity    Alcohol use: No    Drug use: No    Sexual activity: Not on file   Other Topics Concern    Not on file   Social History Narrative    Not on file     Social Determinants of  Health     Financial Resource Strain: Not on file   Food Insecurity: Not on file   Transportation Needs: Not on file   Physical Activity: Not on file   Stress: Not on file   Social Connections: Not on file   Housing Stability: Not on file         ROS:   Positive for stated complaint: Cough, congestion, sore throat, headache, body aches, fatigue  All other systems reviewed and negative except as noted above.  Constitutional and Vital Signs Reviewed.      Physical Exam:     Findings:    /74   Pulse 80   Temp 97.7 °F (36.5 °C) (Temporal)   Resp 16   LMP 11/20/2010   SpO2 97%   GENERAL: well developed, no acute distress, non-toxic appearing   SKIN: good skin turgor, no obvious rashes  HEAD: normocephalic, atraumatic  EYES: sclera non-icteric bilaterally, conjunctiva clear  NOSE: nasal congestion  THROAT: clear, without exudates or swelling, uvula midline, airway patent  NECK: supple, no adenopathy, no nuchal rigidity, no trismus   CARDIO: RRR without murmur  LUNGS: clear to auscultation bilaterally, no increased WOB, no rales, rhonchi, or wheezes  EXTREMITIES: no cyanosis or edema, MAI without difficulty  GI: soft, non-tender  NEURO: no focal deficits  PSYCH: alert and oriented x3.  Answering questions appropriately.  Mood appropriate.    MDM/Assessment/Plan:   Orders for this encounter:    Orders Placed This Encounter    Rapid SARS-CoV-2 by PCR     Order Specific Question:   Release to patient     Answer:   Immediate    nirmatrelvir-ritonavir 300-100 MG Oral Tablet Therapy Pack     Sig: Take two nirmatrelvir tablets (300mg) with one ritonavir tablet (100mg) together twice daily for 5 days.     Dispense:  30 tablet     Refill:  0    benzonatate 200 MG Oral Cap     Sig: Take 1 capsule (200 mg total) by mouth 3 (three) times daily as needed for cough.     Dispense:  20 capsule     Refill:  0    levothyroxine 75 MCG Oral Tab     Sig: Take 1 tablet (75 mcg total) by mouth before breakfast for 14 days.      Dispense:  14 tablet     Refill:  0       Labs performed this visit:  Recent Results (from the past 10 hour(s))   Rapid SARS-CoV-2 by PCR    Collection Time: 01/04/24  9:36 AM    Specimen: Nares; Other   Result Value Ref Range    Rapid SARS-CoV-2 by PCR Detected (A) Not Detected       Imaging performed this visit:  No orders to display       MDM:  DDx includes COVID versus viral URI versus other.  Patient is overall very well-appearing with stable vitals and tolerating oral intake.  No hypoxia or signs of respiratory distress.  No chest pain or shortness of breath.  COVID test positive. Given patient's medical history, patient qualifies for antiviral treatment for COVID-19 with Paxlovid.  Discussed risks versus benefits of treatment with Paxlovid including medication interactions and side effects.  Provided patient with FDA emergency use authorization Paxlovid fact sheet.  Patient verbalizes understanding and would like treatment with Paxlovid at this time.  Reviewed patient's medication list, instructed patient to discontinue rosuvastatin for 7 days while taking Paxlovid.  Patient has no history of kidney disease.  Rx Paxlovid x 5 days.  Rx Tessalon for cough as needed.  Will also refill short course of patient's levothyroxine however instructed patient to follow-up promptly with PCP for thyroid level check.  Patient verbalizes understanding.  Discussed supportive care including rest, increased fluid intake, and OTC Tylenol/Motrin as needed for pain or fevers.  Instructed patient to go directly to nearest ER with any worsening or concerning symptoms.  Discussed quarantine guidelines and infection control.  Follow-up with PCP.      Diagnosis:    ICD-10-CM    1. COVID-19  U07.1       2. Encounter for medication refill  Z76.0           All results reviewed and discussed with patient/patient's family. Patient/patient's family verbalize understanding of instructions. All of patient's/patient's family's questions were  addressed.   See AVS for detailed discharge instructions for your condition today.    Follow Up with:  Ervin Andrade DO  [address] [place] [address]  [phone]    Schedule an appointment as soon as possible for a visit            Fay Bae PA-C

## 2024-01-10 ENCOUNTER — OFFICE VISIT (OUTPATIENT)
Dept: FAMILY MEDICINE CLINIC | Facility: CLINIC | Age: 64
End: 2024-01-10
Payer: COMMERCIAL

## 2024-01-10 VITALS
HEART RATE: 73 BPM | TEMPERATURE: 97 F | DIASTOLIC BLOOD PRESSURE: 83 MMHG | WEIGHT: 135 LBS | BODY MASS INDEX: 22.49 KG/M2 | SYSTOLIC BLOOD PRESSURE: 134 MMHG | HEIGHT: 65 IN

## 2024-01-10 DIAGNOSIS — U07.1 COVID-19: Primary | ICD-10-CM

## 2024-01-10 DIAGNOSIS — E06.3 HYPOTHYROIDISM DUE TO HASHIMOTO'S THYROIDITIS: ICD-10-CM

## 2024-01-10 DIAGNOSIS — J22 LOWER RESPIRATORY TRACT INFECTION: ICD-10-CM

## 2024-01-10 DIAGNOSIS — E03.8 HYPOTHYROIDISM DUE TO HASHIMOTO'S THYROIDITIS: ICD-10-CM

## 2024-01-10 PROCEDURE — 3075F SYST BP GE 130 - 139MM HG: CPT | Performed by: NURSE PRACTITIONER

## 2024-01-10 PROCEDURE — 99214 OFFICE O/P EST MOD 30 MIN: CPT | Performed by: NURSE PRACTITIONER

## 2024-01-10 PROCEDURE — 3008F BODY MASS INDEX DOCD: CPT | Performed by: NURSE PRACTITIONER

## 2024-01-10 PROCEDURE — 3079F DIAST BP 80-89 MM HG: CPT | Performed by: NURSE PRACTITIONER

## 2024-01-10 RX ORDER — CODEINE PHOSPHATE/GUAIFENESIN 10-100MG/5
LIQUID (ML) ORAL
Qty: 180 ML | Refills: 0 | Status: SHIPPED | OUTPATIENT
Start: 2024-01-10

## 2024-01-10 RX ORDER — LEVOTHYROXINE SODIUM 0.07 MG/1
75 TABLET ORAL
Qty: 90 TABLET | Refills: 1 | Status: SHIPPED | OUTPATIENT
Start: 2024-01-10

## 2024-01-10 RX ORDER — PREDNISONE 20 MG/1
TABLET ORAL
Qty: 10 TABLET | Refills: 0 | Status: SHIPPED | OUTPATIENT
Start: 2024-01-10

## 2024-01-10 RX ORDER — ALBUTEROL SULFATE 90 UG/1
2 AEROSOL, METERED RESPIRATORY (INHALATION) EVERY 4 HOURS PRN
Qty: 18 G | Refills: 5 | Status: SHIPPED | OUTPATIENT
Start: 2024-01-10

## 2024-01-10 RX ORDER — LEVOFLOXACIN 500 MG/1
500 TABLET, FILM COATED ORAL DAILY
Qty: 10 TABLET | Refills: 0 | Status: SHIPPED | OUTPATIENT
Start: 2024-01-10 | End: 2024-01-20

## 2024-01-10 NOTE — PROGRESS NOTES
Cough  Associated symptoms include a fever and shortness of breath. Pertinent negatives include no chest pain, ear pain, headaches, rhinorrhea, sore throat or wheezing.     Pt here for follow up Covid last week. Was seen in  1/4/2024 and given tessalon perles. Tessalon gave her very bad diarrhea.     Still having a lot of congestion and coughing,  Is having a lot of coughing with talking. Cough is prod for green phlegm     Needs refill on thyroid meds. Is due for physical .     Review of Systems   Constitutional:  Positive for activity change, fatigue and fever.   HENT:  Positive for congestion. Negative for ear pain, rhinorrhea and sore throat.    Respiratory:  Positive for cough, chest tightness and shortness of breath. Negative for wheezing.    Cardiovascular:  Negative for chest pain.   Gastrointestinal:  Negative for abdominal pain.   Neurological:  Negative for headaches.   Psychiatric/Behavioral:  Positive for sleep disturbance.        Vitals:    01/10/24 1423   BP: 134/83   Pulse: 73   Temp: 96.8 °F (36 °C)   TempSrc: Tympanic   Weight: 135 lb (61.2 kg)   Height: 5' 5\" (1.651 m)     Body mass index is 22.47 kg/m².  Wt Readings from Last 6 Encounters:   01/10/24 135 lb (61.2 kg)   04/20/23 140 lb (63.5 kg)   02/15/23 133 lb (60.3 kg)   02/01/23 131 lb (59.4 kg)   01/11/23 131 lb (59.4 kg)   11/11/22 132 lb (59.9 kg)        Health Maintenance   Topic Date Due    DTaP,Tdap,and Td Vaccines (1 - Tdap) Never done    Mammogram  01/27/2023    COVID-19 Vaccine (3 - 2023-24 season) 09/01/2023    Influenza Vaccine (1) 10/01/2023    Annual Depression Screening  01/01/2024    Annual Physical  01/11/2024    Colorectal Cancer Screening  01/18/2032    Zoster Vaccines  Completed    Pneumococcal Vaccine: Birth to 64yrs  Aged Out       Patient's last menstrual period was 11/20/2010.    Past Medical History:   Diagnosis Date    Disorder of thyroid     hypothyroid    Visual impairment     reading glasses       .  Past Surgical  History:   Procedure Laterality Date    Colonoscopy      Egd      Total abdominal hysterectomy N/A 11/28/2018    Procedure: ABDOMINAL HYSTERECTOMY;  Surgeon: Yared Berry MD;  Location: [facility] MAIN OR       Family History   Problem Relation Age of Onset    Cancer Father         thyroid cancer    Diabetes Mother     Breast Cancer Mother 60    Breast Cancer Sister 50       Social History     Socioeconomic History    Marital status: Single     Spouse name: Not on file    Number of children: Not on file    Years of education: Not on file    Highest education level: Not on file   Occupational History    Not on file   Tobacco Use    Smoking status: Former     Types: Cigarettes    Smokeless tobacco: Never    Tobacco comments:     Social smoking   Vaping Use    Vaping Use: Never used   Substance and Sexual Activity    Alcohol use: No    Drug use: No    Sexual activity: Not on file   Other Topics Concern    Not on file   Social History Narrative    Not on file     Social Determinants of Health     Financial Resource Strain: Not on file   Food Insecurity: Not on file   Transportation Needs: Not on file   Physical Activity: Not on file   Stress: Not on file   Social Connections: Not on file   Housing Stability: Not on file       Current Outpatient Medications   Medication Sig Dispense Refill    levothyroxine 75 MCG Oral Tab Take 1 tablet (75 mcg total) by mouth before breakfast. 90 tablet 1    predniSONE 20 MG Oral Tab Take 2 tablets once a day for 5 days 10 tablet 0    levoFLOXacin (LEVAQUIN) 500 MG Oral Tab Take 1 tablet (500 mg total) by mouth daily for 10 days. 10 tablet 0    albuterol 108 (90 Base) MCG/ACT Inhalation Aero Soln Inhale 2 puffs into the lungs every 4 (four) hours as needed for Wheezing or Shortness of Breath. 18 g 5    guaiFENesin-Codeine 100-10 MG/5ML Oral Syrup Take 1 to 2  tsp every 6 hours for cough as needed. May cause drowsiness 180 mL 0    Spacer/Aero-Holding Chambers Does not apply Device Use with  hfa inhaler as directed 1 each 0    Fenofibrate 134 MG Oral Cap Take 1 capsule by mouth daily.      naproxen (NAPROSYN) 500 MG Oral Tab Take 1 tablet (500 mg total) by mouth 2 (two) times daily with meals. Take for 2 weeks as directed and then as needed. 60 tablet 0    omega-3-acid ethyl esters 1 g Oral Cap Take two capsules twice a day 360 capsule 3    rosuvastatin 40 MG Oral Tab Take 1 tablet (40 mg total) by mouth nightly. 90 tablet 1    benzonatate 200 MG Oral Cap Take 1 capsule (200 mg total) by mouth 3 (three) times daily as needed for cough. (Patient not taking: Reported on 1/10/2024) 20 capsule 0       Allergies:  Allergies   Allergen Reactions    Milk-Related Compounds DIARRHEA    Penicillins DIARRHEA     Confirmed with patient never had true allergy only stomach upset and diarrhea        Physical Exam  Vitals and nursing note reviewed.   Constitutional:       General: She is in acute distress (freq coughing).      Appearance: She is ill-appearing.   HENT:      Head: Normocephalic.   Cardiovascular:      Rate and Rhythm: Normal rate and regular rhythm.      Heart sounds: Normal heart sounds.   Pulmonary:      Effort: Pulmonary effort is normal.      Breath sounds: Rhonchi (clear w coughing) present.      Comments: Freq productive cough  Neurological:      Mental Status: She is alert and oriented to person, place, and time.       UC note reviewed.   Assessment and Plan:  Problem List Items Addressed This Visit       COVID-19 - Primary    Relevant Medications    levoFLOXacin (LEVAQUIN) 500 MG Oral Tab    Hypothyroidism due to Hashimoto's thyroiditis    Relevant Medications    levothyroxine 75 MCG Oral Tab    predniSONE 20 MG Oral Tab    Lower respiratory tract infection    Relevant Medications    predniSONE 20 MG Oral Tab    levoFLOXacin (LEVAQUIN) 500 MG Oral Tab    albuterol 108 (90 Base) MCG/ACT Inhalation Aero Soln    guaiFENesin-Codeine 100-10 MG/5ML Oral Syrup    Spacer/Aero-Holding Chambers Does not  apply Device         Follow up for physical  when feeling better.          Discussed plan of care with pt and pt is in agreement.All questions answered. Pt to call with questions or concerns.    Encouraged to sign up for My Chart if not already registered.

## 2024-01-26 ENCOUNTER — HOSPITAL ENCOUNTER (OUTPATIENT)
Dept: GENERAL RADIOLOGY | Age: 64
Discharge: HOME | End: 2024-01-26
Attending: NURSE PRACTITIONER
Payer: COMMERCIAL

## 2024-01-26 ENCOUNTER — OFFICE VISIT (OUTPATIENT)
Dept: FAMILY MEDICINE CLINIC | Facility: CLINIC | Age: 64
End: 2024-01-26

## 2024-01-26 VITALS
SYSTOLIC BLOOD PRESSURE: 134 MMHG | HEIGHT: 65 IN | WEIGHT: 134 LBS | DIASTOLIC BLOOD PRESSURE: 75 MMHG | BODY MASS INDEX: 22.33 KG/M2 | HEART RATE: 77 BPM

## 2024-01-26 DIAGNOSIS — E55.9 VITAMIN D DEFICIENCY: ICD-10-CM

## 2024-01-26 DIAGNOSIS — M21.41 FLAT FEET: ICD-10-CM

## 2024-01-26 DIAGNOSIS — K52.9 CHRONIC DIARRHEA: ICD-10-CM

## 2024-01-26 DIAGNOSIS — E03.8 HYPOTHYROIDISM DUE TO HASHIMOTO'S THYROIDITIS: ICD-10-CM

## 2024-01-26 DIAGNOSIS — M21.42 FLAT FEET: ICD-10-CM

## 2024-01-26 DIAGNOSIS — I10 PRIMARY HYPERTENSION: ICD-10-CM

## 2024-01-26 DIAGNOSIS — U07.1 COVID-19: ICD-10-CM

## 2024-01-26 DIAGNOSIS — Z23 INFLUENZA VACCINE NEEDED: ICD-10-CM

## 2024-01-26 DIAGNOSIS — M25.572 ACUTE LEFT ANKLE PAIN: ICD-10-CM

## 2024-01-26 DIAGNOSIS — Z00.00 WELL ADULT EXAM: Primary | ICD-10-CM

## 2024-01-26 DIAGNOSIS — R92.1 BREAST CALCIFICATIONS ON MAMMOGRAM: ICD-10-CM

## 2024-01-26 DIAGNOSIS — E78.2 MIXED HYPERLIPIDEMIA: ICD-10-CM

## 2024-01-26 DIAGNOSIS — E06.3 HYPOTHYROIDISM DUE TO HASHIMOTO'S THYROIDITIS: ICD-10-CM

## 2024-01-26 DIAGNOSIS — Z12.31 BREAST CANCER SCREENING BY MAMMOGRAM: ICD-10-CM

## 2024-01-26 PROBLEM — R19.7 DIARRHEA: Status: RESOLVED | Noted: 2019-11-19 | Resolved: 2024-01-26

## 2024-01-26 PROBLEM — R14.0 ABDOMINAL BLOATING: Status: RESOLVED | Noted: 2019-11-19 | Resolved: 2024-01-26

## 2024-01-26 PROBLEM — L05.91 CYST NEAR COCCYX: Status: RESOLVED | Noted: 2023-01-11 | Resolved: 2024-01-26

## 2024-01-26 PROBLEM — M54.2 NECK PAIN: Status: RESOLVED | Noted: 2023-01-11 | Resolved: 2024-01-26

## 2024-01-26 PROBLEM — M54.41 CHRONIC BILATERAL LOW BACK PAIN WITH RIGHT-SIDED SCIATICA: Status: RESOLVED | Noted: 2023-04-20 | Resolved: 2024-01-26

## 2024-01-26 PROBLEM — J22 LOWER RESPIRATORY TRACT INFECTION: Status: RESOLVED | Noted: 2024-01-10 | Resolved: 2024-01-26

## 2024-01-26 PROBLEM — G89.29 CHRONIC BILATERAL LOW BACK PAIN WITH RIGHT-SIDED SCIATICA: Status: RESOLVED | Noted: 2023-04-20 | Resolved: 2024-01-26

## 2024-01-26 PROCEDURE — 73610 X-RAY EXAM OF ANKLE: CPT | Performed by: NURSE PRACTITIONER

## 2024-01-26 NOTE — ASSESSMENT & PLAN NOTE
Pt has had many tests, colonoscopy done and have been unable to find cause of diarrhea  Check iron levels

## 2024-01-26 NOTE — ASSESSMENT & PLAN NOTE
Screening labs  Please aim to eat a diet high in fresh fruits and vegetables, lean protein sources, complex carbohydrates and limited processed and fast foods.  Try to get at least 150 minutes of exercise per week-a combination of weight resistance and cardio is preferred.    Mammogram dx ordered  Flu shot, declines tdap  Up to date pap, colonoscopy

## 2024-01-26 NOTE — ASSESSMENT & PLAN NOTE
Ice 20 min 4-6 times per day  Do not use heat on injury  Do not apply ice directly on skin, make certain a thin cloth is between skin and ice pack    Continue over the counter cream  Refer podiatry due to flat feet

## 2024-01-26 NOTE — PROGRESS NOTES
HPI  Pt here for physical     Has some left ankle pain-has swelling and pain in post foot-more pain w going up and down stairs.    Mammogram due    Has recovered from covid    Has h/o high iron levels.    Still having chronic diarrhea. Takes over the counter diarrhea-is concerned that it may be due to high iron levels.     Diet-healthy  Exercise-a couple of times per week  Sleep-intermittent-seems to be affected by weather.      Review of Systems   Constitutional:  Positive for activity change. Negative for appetite change, fatigue, fever and unexpected weight change.   HENT:  Negative for congestion, ear pain, rhinorrhea and sneezing.    Eyes:  Negative for pain, redness and visual disturbance.   Respiratory:  Negative for cough, chest tightness, shortness of breath and wheezing.    Cardiovascular:  Negative for chest pain and palpitations.   Gastrointestinal:  Positive for diarrhea. Negative for abdominal distention, abdominal pain, constipation, nausea and vomiting.   Genitourinary:  Negative for dysuria.   Musculoskeletal:  Positive for arthralgias and gait problem. Negative for back pain and myalgias.   Skin:  Negative for color change and rash.   Neurological:  Negative for dizziness, weakness and headaches.   Psychiatric/Behavioral:  Negative for dysphoric mood, self-injury, sleep disturbance (intermittent) and suicidal ideas. The patient is not nervous/anxious.        Vitals:    01/26/24 1306   BP: 134/75   Pulse: 77   Weight: 134 lb (60.8 kg)   Height: 5' 5\" (1.651 m)     Body mass index is 22.3 kg/m².  Wt Readings from Last 6 Encounters:   01/26/24 134 lb (60.8 kg)   01/10/24 135 lb (61.2 kg)   04/20/23 140 lb (63.5 kg)   02/15/23 133 lb (60.3 kg)   02/01/23 131 lb (59.4 kg)   01/11/23 131 lb (59.4 kg)        Health Maintenance   Topic Date Due    DTaP,Tdap,and Td Vaccines (1 - Tdap) Never done    Mammogram  01/27/2023    COVID-19 Vaccine (3 - 2023-24 season) 09/01/2023    Influenza Vaccine (1) 10/01/2023     Annual Depression Screening  01/01/2024    Annual Physical  01/11/2024    Colorectal Cancer Screening  01/18/2032    Zoster Vaccines  Completed    Pneumococcal Vaccine: Birth to 64yrs  Aged Out       Patient's last menstrual period was 11/20/2010.    Past Medical History:   Diagnosis Date    Disorder of thyroid     hypothyroid    Visual impairment     reading glasses       .  Past Surgical History:   Procedure Laterality Date    Colonoscopy      Egd      Total abdominal hysterectomy N/A 11/28/2018    Procedure: ABDOMINAL HYSTERECTOMY;  Surgeon: Yared Berry MD;  Location: [facility] MAIN OR       Family History   Problem Relation Age of Onset    Cancer Father         thyroid cancer    Diabetes Mother     Breast Cancer Mother 60    Breast Cancer Sister 50       Social History     Socioeconomic History    Marital status: Single     Spouse name: Not on file    Number of children: Not on file    Years of education: Not on file    Highest education level: Not on file   Occupational History    Not on file   Tobacco Use    Smoking status: Former     Types: Cigarettes    Smokeless tobacco: Never    Tobacco comments:     Social smoking   Vaping Use    Vaping Use: Never used   Substance and Sexual Activity    Alcohol use: No    Drug use: No    Sexual activity: Not on file   Other Topics Concern    Not on file   Social History Narrative    Not on file     Social Determinants of Health     Financial Resource Strain: Not on file   Food Insecurity: Not on file   Transportation Needs: Not on file   Physical Activity: Not on file   Stress: Not on file   Social Connections: Not on file   Housing Stability: Not on file       Current Outpatient Medications   Medication Sig Dispense Refill    levothyroxine 75 MCG Oral Tab Take 1 tablet (75 mcg total) by mouth before breakfast. 90 tablet 1    predniSONE 20 MG Oral Tab Take 2 tablets once a day for 5 days 10 tablet 0    albuterol 108 (90 Base) MCG/ACT Inhalation Aero Soln Inhale 2 puffs  into the lungs every 4 (four) hours as needed for Wheezing or Shortness of Breath. 18 g 5    guaiFENesin-Codeine 100-10 MG/5ML Oral Syrup Take 1 to 2  tsp every 6 hours for cough as needed. May cause drowsiness 180 mL 0    Spacer/Aero-Holding Chambers Does not apply Device Use with hfa inhaler as directed 1 each 0    benzonatate 200 MG Oral Cap Take 1 capsule (200 mg total) by mouth 3 (three) times daily as needed for cough. (Patient not taking: Reported on 1/10/2024) 20 capsule 0    Fenofibrate 134 MG Oral Cap Take 1 capsule by mouth daily.      naproxen (NAPROSYN) 500 MG Oral Tab Take 1 tablet (500 mg total) by mouth 2 (two) times daily with meals. Take for 2 weeks as directed and then as needed. 60 tablet 0    omega-3-acid ethyl esters 1 g Oral Cap Take two capsules twice a day 360 capsule 3    rosuvastatin 40 MG Oral Tab Take 1 tablet (40 mg total) by mouth nightly. 90 tablet 1       Allergies:  Allergies   Allergen Reactions    Milk-Related Compounds DIARRHEA    Penicillins DIARRHEA     Confirmed with patient never had true allergy only stomach upset and diarrhea        Physical Exam  Vitals and nursing note reviewed.   Constitutional:       General: She is not in acute distress.     Appearance: Normal appearance. She is well-developed and normal weight.   HENT:      Head: Normocephalic and atraumatic.      Right Ear: Tympanic membrane, ear canal and external ear normal.      Left Ear: Tympanic membrane, ear canal and external ear normal.      Nose: Nose normal. No congestion or rhinorrhea.      Mouth/Throat:      Mouth: Mucous membranes are moist.      Pharynx: Oropharynx is clear. No oropharyngeal exudate or posterior oropharyngeal erythema.   Eyes:      General:         Right eye: No discharge.         Left eye: No discharge.      Conjunctiva/sclera: Conjunctivae normal.      Pupils: Pupils are equal, round, and reactive to light.   Neck:      Thyroid: No thyromegaly.   Cardiovascular:      Rate and Rhythm:  Normal rate and regular rhythm.      Heart sounds: Normal heart sounds. No murmur heard.  Pulmonary:      Effort: Pulmonary effort is normal. No respiratory distress.      Breath sounds: Normal breath sounds. No wheezing or rales.   Chest:      Chest wall: No tenderness.   Abdominal:      General: Bowel sounds are normal. There is no distension.      Palpations: Abdomen is soft.      Tenderness: There is no abdominal tenderness.   Musculoskeletal:         General: Swelling and tenderness present.      Cervical back: Normal range of motion and neck supple.      Right foot: Normal.      Left foot: Decreased range of motion. Swelling present.        Legs:       Comments: Left foot is flat, post heel and top of foot pain and tenderness.    Lymphadenopathy:      Cervical: No cervical adenopathy.   Skin:     General: Skin is warm and dry.      Findings: No rash.   Neurological:      Mental Status: She is alert and oriented to person, place, and time.      Coordination: Coordination normal.   Psychiatric:         Behavior: Behavior normal.         Thought Content: Thought content normal.         Judgment: Judgment normal.         Assessment and Plan:  Problem List Items Addressed This Visit       Acute left ankle pain     Ice 20 min 4-6 times per day  Do not use heat on injury  Do not apply ice directly on skin, make certain a thin cloth is between skin and ice pack    Continue over the counter cream  Refer podiatry due to flat feet         Relevant Orders    XR ANKLE (MIN 3 VIEWS), LEFT (CPT=73610)    PODIATRY - INTERNAL    Breast calcifications on mammogram     Dx mammogram           Relevant Orders    [facility] YAS 2D+3D DIAGNOSTIC [facility]  BILAT (CPT=77066/38602)    Breast cancer screening by mammogram     Mammogram ordered         Chronic diarrhea     Pt has had many tests, colonoscopy done and have been unable to find cause of diarrhea  Check iron levels          COVID-19     Symptoms resolved         Flat feet     Refer  podiatry         Relevant Orders    PODIATRY - INTERNAL    Hypothyroidism due to Hashimoto's thyroiditis     Check thyroid levels  Had ultrasound done last year-stable         Influenza vaccine needed     Flu shot today         Relevant Orders    FLULAVAL INFLUENZA VACCINE QUAD PRESERVATIVE FREE 0.5 ML (Completed)    Mixed hyperlipidemia     Continue present management  Check lipid levels         Primary hypertension     cpm         Vitamin D deficiency     Check vitamin d          Well adult exam - Primary     Screening labs  Please aim to eat a diet high in fresh fruits and vegetables, lean protein sources, complex carbohydrates and limited processed and fast foods.  Try to get at least 150 minutes of exercise per week-a combination of weight resistance and cardio is preferred.    Mammogram dx ordered  Flu shot, declines tdap  Up to date pap, colonoscopy         Relevant Orders    CBC, Platelet; No Differential    Comp Metabolic Panel (14)    Hemoglobin A1C    Lipid Panel    TSH W Reflex To Free T4    Vitamin B12    Vitamin D    Ferritin    Iron And Tibc                   Discussed plan of care with pt and pt is in agreement.All questions answered. Pt to call with questions or concerns.    Encouraged to sign up for My Chart if not already registered.

## 2024-01-27 ENCOUNTER — LAB ENCOUNTER (OUTPATIENT)
Dept: LAB | Age: 64
End: 2024-01-27
Attending: NURSE PRACTITIONER
Payer: COMMERCIAL

## 2024-01-27 DIAGNOSIS — Z00.00 WELL ADULT EXAM: ICD-10-CM

## 2024-01-27 LAB
ALBUMIN SERPL-MCNC: 4.4 G/DL (ref 3.2–4.8)
ALBUMIN/GLOB SERPL: 2 {RATIO} (ref 1–2)
ALP LIVER SERPL-CCNC: 78 U/L
ALT SERPL-CCNC: 35 U/L
ANION GAP SERPL CALC-SCNC: 6 MMOL/L (ref 0–18)
AST SERPL-CCNC: 24 U/L (ref ?–34)
BILIRUB SERPL-MCNC: 0.6 MG/DL (ref 0.2–1.1)
BUN BLD-MCNC: 16 MG/DL (ref 9–23)
BUN/CREAT SERPL: 19 (ref 10–20)
CALCIUM BLD-MCNC: 9.3 MG/DL (ref 8.7–10.4)
CHLORIDE SERPL-SCNC: 110 MMOL/L (ref 98–112)
CHOLEST SERPL-MCNC: 244 MG/DL (ref ?–200)
CO2 SERPL-SCNC: 27 MMOL/L (ref 21–32)
CREAT BLD-MCNC: 0.84 MG/DL
DEPRECATED HBV CORE AB SER IA-ACNC: 141.5 NG/ML
DEPRECATED RDW RBC AUTO: 41.2 FL (ref 35.1–46.3)
EGFRCR SERPLBLD CKD-EPI 2021: 78 ML/MIN/1.73M2 (ref 60–?)
ERYTHROCYTE [DISTWIDTH] IN BLOOD BY AUTOMATED COUNT: 12.1 % (ref 11–15)
EST. AVERAGE GLUCOSE BLD GHB EST-MCNC: 128 MG/DL (ref 68–126)
FASTING PATIENT LIPID ANSWER: YES
FASTING STATUS PATIENT QL REPORTED: YES
GLOBULIN PLAS-MCNC: 2.2 G/DL (ref 2.8–4.4)
GLUCOSE BLD-MCNC: 103 MG/DL (ref 70–99)
HBA1C MFR BLD: 6.1 % (ref ?–5.7)
HCT VFR BLD AUTO: 44.2 %
HDLC SERPL-MCNC: 46 MG/DL (ref 40–59)
HGB BLD-MCNC: 14.3 G/DL
IRON SATN MFR SERPL: 34 %
IRON SERPL-MCNC: 94 UG/DL
LDLC SERPL CALC-MCNC: 134 MG/DL (ref ?–100)
MCH RBC QN AUTO: 30 PG (ref 26–34)
MCHC RBC AUTO-ENTMCNC: 32.4 G/DL (ref 31–37)
MCV RBC AUTO: 92.9 FL
NONHDLC SERPL-MCNC: 198 MG/DL (ref ?–130)
OSMOLALITY SERPL CALC.SUM OF ELEC: 297 MOSM/KG (ref 275–295)
PLATELET # BLD AUTO: 200 10(3)UL (ref 150–450)
POTASSIUM SERPL-SCNC: 4.3 MMOL/L (ref 3.5–5.1)
PROT SERPL-MCNC: 6.6 G/DL (ref 5.7–8.2)
RBC # BLD AUTO: 4.76 X10(6)UL
SODIUM SERPL-SCNC: 143 MMOL/L (ref 136–145)
TIBC SERPL-MCNC: 276 UG/DL (ref 250–425)
TRANSFERRIN SERPL-MCNC: 185 MG/DL (ref 250–380)
TRIGL SERPL-MCNC: 355 MG/DL (ref 30–149)
TSI SER-ACNC: 1.64 MIU/ML (ref 0.55–4.78)
VIT B12 SERPL-MCNC: 841 PG/ML (ref 211–911)
VIT D+METAB SERPL-MCNC: 31.1 NG/ML (ref 30–100)
VLDLC SERPL CALC-MCNC: 66 MG/DL (ref 0–30)
WBC # BLD AUTO: 5.4 X10(3) UL (ref 4–11)

## 2024-01-27 PROCEDURE — 82306 VITAMIN D 25 HYDROXY: CPT

## 2024-01-27 PROCEDURE — 85027 COMPLETE CBC AUTOMATED: CPT

## 2024-01-27 PROCEDURE — 82607 VITAMIN B-12: CPT

## 2024-01-27 PROCEDURE — 80053 COMPREHEN METABOLIC PANEL: CPT

## 2024-01-27 PROCEDURE — 36415 COLL VENOUS BLD VENIPUNCTURE: CPT

## 2024-01-27 PROCEDURE — 83036 HEMOGLOBIN GLYCOSYLATED A1C: CPT

## 2024-01-27 PROCEDURE — 83540 ASSAY OF IRON: CPT

## 2024-01-27 PROCEDURE — 84466 ASSAY OF TRANSFERRIN: CPT

## 2024-01-27 PROCEDURE — 80061 LIPID PANEL: CPT

## 2024-01-27 PROCEDURE — 84443 ASSAY THYROID STIM HORMONE: CPT

## 2024-01-27 PROCEDURE — 82728 ASSAY OF FERRITIN: CPT

## 2024-02-19 ENCOUNTER — OFFICE VISIT (OUTPATIENT)
Dept: PODIATRY CLINIC | Facility: CLINIC | Age: 64
End: 2024-02-19

## 2024-02-19 DIAGNOSIS — M76.62 TENDONITIS, ACHILLES, LEFT: Primary | ICD-10-CM

## 2024-02-19 DIAGNOSIS — M21.6X2 EQUINUS DEFORMITY OF LEFT FOOT: ICD-10-CM

## 2024-02-19 PROCEDURE — 99203 OFFICE O/P NEW LOW 30 MIN: CPT | Performed by: PODIATRIST

## 2024-02-19 NOTE — PROGRESS NOTES
[facility]  Progress Note    Merary Mckeon is a 63 year old female.   Chief Complaint   Patient presents with    Ankle Pain     Consult- L ankle - Pain onset  3 weeks ago. Pt states pain and swelling in ankle.  Pain when walking. Pt states ankle feels like it twisting.  XR in system.         HPI:     This is a pleasant female with HTN.    She presents to clinic today due to left Achilles tendon pain.  She states it has been present for about 3 weeks.  She denies any injury.  She has been using pain cream which has helped.  She is also taking aleve which helps.         Allergies: Milk-related compounds and Penicillins   Current Outpatient Medications   Medication Sig Dispense Refill    levothyroxine 75 MCG Oral Tab Take 1 tablet (75 mcg total) by mouth before breakfast. 90 tablet 1    predniSONE 20 MG Oral Tab Take 2 tablets once a day for 5 days 10 tablet 0    albuterol 108 (90 Base) MCG/ACT Inhalation Aero Soln Inhale 2 puffs into the lungs every 4 (four) hours as needed for Wheezing or Shortness of Breath. 18 g 5    guaiFENesin-Codeine 100-10 MG/5ML Oral Syrup Take 1 to 2  tsp every 6 hours for cough as needed. May cause drowsiness 180 mL 0    Spacer/Aero-Holding Chambers Does not apply Device Use with hfa inhaler as directed 1 each 0    Fenofibrate 134 MG Oral Cap Take 1 capsule by mouth daily.      naproxen (NAPROSYN) 500 MG Oral Tab Take 1 tablet (500 mg total) by mouth 2 (two) times daily with meals. Take for 2 weeks as directed and then as needed. 60 tablet 0    omega-3-acid ethyl esters 1 g Oral Cap Take two capsules twice a day 360 capsule 3    rosuvastatin 40 MG Oral Tab Take 1 tablet (40 mg total) by mouth nightly. 90 tablet 1    benzonatate 200 MG Oral Cap Take 1 capsule (200 mg total) by mouth 3 (three) times daily as needed for cough. (Patient not taking: Reported on 1/10/2024) 20 capsule 0      Past Medical History:   Diagnosis Date    Disorder of thyroid     hypothyroid     Visual impairment     reading glasses      Past Surgical History:   Procedure Laterality Date    COLONOSCOPY      EGD      TOTAL ABDOMINAL HYSTERECTOMY N/A 11/28/2018    Procedure: ABDOMINAL HYSTERECTOMY;  Surgeon: Yared Berry MD;  Location: [facility] MAIN OR      Family History   Problem Relation Age of Onset    Cancer Father         thyroid cancer    Diabetes Mother     Breast Cancer Mother 60    Breast Cancer Sister 50      Social History     Socioeconomic History    Marital status: Single   Tobacco Use    Smoking status: Former     Types: Cigarettes    Smokeless tobacco: Never    Tobacco comments:     Social smoking   Vaping Use    Vaping Use: Never used   Substance and Sexual Activity    Alcohol use: No    Drug use: No           REVIEW OF SYSTEMS:   Denies nausea, fever, chills  No calf pain  No other muscle or joint aches  Denies chest pain or shortness of breath.      EXAM:   LMP 11/20/2010     Constitution: Well-developed and well-nourished. Gait appears normal. No apparent distress. Alert and oriented to person, place, and time.  Integument: There are no varicosities. Skin appears moist, warm, and supple with positive hair growth. There are no color changes. No open lesions. No macerations, No Hyperkeratotic lesions.  Vascular examination: Dorsalis pedis and posterior tibial pulses are strong bilaterally with capillary filling time less than 3 seconds to all digits. There is no peripheral edema.  Neurological Sensorium: Grossly intact to sharp/dull. Vibratory: Intact.  Musculoskeletal:   5/5 pedal muscle strength b/l.  Pain on compression to Left Achilles tendon mid substance with no palpable dell  Decreased left ankle DF with knee extended approx 0 degrees       LABS & IMAGING:     Lab Results   Component Value Date     (H) 01/27/2024    BUN 16 01/27/2024    CREATSERUM 0.84 01/27/2024    BUNCREA 19.0 01/27/2024    ANIONGAP 6 01/27/2024    GFRAA 99 12/28/2021    GFRNAA 86 12/28/2021    CA 9.3  01/27/2024     01/27/2024    K 4.3 01/27/2024     01/27/2024    CO2 27.0 01/27/2024    OSMOCALC 297 (H) 01/27/2024        Lab Results   Component Value Date     (H) 01/27/2024    A1C 6.1 (H) 01/27/2024        No results found.     ASSESSMENT AND PLAN:   Diagnoses and all orders for this visit:    Tendonitis, Achilles, left    Equinus deformity of left foot        Plan:     Discussed the importance of daily stretching- stretch 5 times per day continued.  Discussed conservative management and potential for formal PT.  Discussed possible oral steroids if patient is not diabetic, otherwise use of NSAIDS if no history of GERD or stomach upset.  Recommend cryotherapy/icing.  Discussed the importance of rest and the need for immobilization.    Xray left ankle: 1/26/24: 1. No acute fracture or dislocation.   2. Mild hindfoot degeneration.  Small plantar calcaneal enthesophyte     Due to only mild pain will hold off on cam boot left LE  Fitted and dispensed b/l gel heel cups, pt to wear when ambulating  Pt would like to hold off on PT at this time    RTC 2 months for re evaluation.  If no improvement will re discuss PT      No follow-ups on file.    Filipe Ferreira DPM  2/19/2024

## 2024-02-23 ENCOUNTER — LAB ENCOUNTER (OUTPATIENT)
Dept: LAB | Age: 64
End: 2024-02-23
Attending: NURSE PRACTITIONER
Payer: COMMERCIAL

## 2024-02-23 ENCOUNTER — OFFICE VISIT (OUTPATIENT)
Dept: FAMILY MEDICINE CLINIC | Facility: CLINIC | Age: 64
End: 2024-02-23

## 2024-02-23 VITALS
WEIGHT: 136 LBS | BODY MASS INDEX: 22.66 KG/M2 | HEIGHT: 65 IN | DIASTOLIC BLOOD PRESSURE: 87 MMHG | HEART RATE: 56 BPM | SYSTOLIC BLOOD PRESSURE: 136 MMHG

## 2024-02-23 DIAGNOSIS — M25.50 ARTHRALGIA OF MULTIPLE JOINTS: Primary | ICD-10-CM

## 2024-02-23 DIAGNOSIS — R22.32 SUBCUTANEOUS NODULE OF LEFT HAND: ICD-10-CM

## 2024-02-23 DIAGNOSIS — E78.2 MIXED HYPERLIPIDEMIA: ICD-10-CM

## 2024-02-23 DIAGNOSIS — Z13.6 SCREENING FOR HEART DISEASE: ICD-10-CM

## 2024-02-23 DIAGNOSIS — M25.50 ARTHRALGIA OF MULTIPLE JOINTS: ICD-10-CM

## 2024-02-23 LAB
CRP SERPL-MCNC: <0.4 MG/DL (ref ?–1)
ERYTHROCYTE [SEDIMENTATION RATE] IN BLOOD: 1 MM/HR
RHEUMATOID FACT SERPL-ACNC: <10 IU/ML (ref ?–14)

## 2024-02-23 PROCEDURE — 3008F BODY MASS INDEX DOCD: CPT | Performed by: NURSE PRACTITIONER

## 2024-02-23 PROCEDURE — 86200 CCP ANTIBODY: CPT

## 2024-02-23 PROCEDURE — 86140 C-REACTIVE PROTEIN: CPT

## 2024-02-23 PROCEDURE — 99214 OFFICE O/P EST MOD 30 MIN: CPT | Performed by: NURSE PRACTITIONER

## 2024-02-23 PROCEDURE — 85652 RBC SED RATE AUTOMATED: CPT

## 2024-02-23 PROCEDURE — 36415 COLL VENOUS BLD VENIPUNCTURE: CPT

## 2024-02-23 PROCEDURE — 86038 ANTINUCLEAR ANTIBODIES: CPT

## 2024-02-23 PROCEDURE — 3079F DIAST BP 80-89 MM HG: CPT | Performed by: NURSE PRACTITIONER

## 2024-02-23 PROCEDURE — 86225 DNA ANTIBODY NATIVE: CPT

## 2024-02-23 PROCEDURE — 3075F SYST BP GE 130 - 139MM HG: CPT | Performed by: NURSE PRACTITIONER

## 2024-02-23 PROCEDURE — 86431 RHEUMATOID FACTOR QUANT: CPT

## 2024-02-23 NOTE — PROGRESS NOTES
HPI  Pt here for referral for rheum-has multiple joint pains. When weather changes, joints swell, feel twisted and are painful  Has left ankle pain-saw podiatry.  Has two nodules on palm of left pain. Has a lot of pain in both hands.     Would like order for ct heart-has special by her house for $50.         Review of Systems   Constitutional:  Positive for activity change.   Musculoskeletal:  Positive for arthralgias, gait problem and joint swelling.       Vitals:    02/23/24 1018 02/23/24 1043   BP: 148/75 136/87   Pulse: 56    Weight: 136 lb (61.7 kg)    Height: 5' 5\" (1.651 m)      Body mass index is 22.63 kg/m².  Wt Readings from Last 6 Encounters:   02/23/24 136 lb (61.7 kg)   01/26/24 134 lb (60.8 kg)   01/10/24 135 lb (61.2 kg)   04/20/23 140 lb (63.5 kg)   02/15/23 133 lb (60.3 kg)   02/01/23 131 lb (59.4 kg)        Health Maintenance   Topic Date Due    DTaP,Tdap,and Td Vaccines (1 - Tdap) Never done    Mammogram  01/27/2023    COVID-19 Vaccine (3 - 2023-24 season) 09/01/2023    Annual Physical  01/26/2025    Colorectal Cancer Screening  01/18/2032    Influenza Vaccine  Completed    Annual Depression Screening  Completed    Zoster Vaccines  Completed    Pneumococcal Vaccine: Birth to 64yrs  Aged Out       Patient's last menstrual period was 11/20/2010.    Past Medical History:   Diagnosis Date    Disorder of thyroid     hypothyroid    Visual impairment     reading glasses       .  Past Surgical History:   Procedure Laterality Date    Colonoscopy      Egd      Total abdominal hysterectomy N/A 11/28/2018    Procedure: ABDOMINAL HYSTERECTOMY;  Surgeon: Yared Berry MD;  Location: [facility] MAIN OR       Family History   Problem Relation Age of Onset    Cancer Father         thyroid cancer    Diabetes Mother     Breast Cancer Mother 60    Breast Cancer Sister 50       Social History     Socioeconomic History    Marital status: Single     Spouse name: Not on file    Number of children: Not on file    Years of  education: Not on file    Highest education level: Not on file   Occupational History    Not on file   Tobacco Use    Smoking status: Former     Types: Cigarettes    Smokeless tobacco: Never    Tobacco comments:     Social smoking   Vaping Use    Vaping Use: Never used   Substance and Sexual Activity    Alcohol use: No    Drug use: No    Sexual activity: Not on file   Other Topics Concern    Not on file   Social History Narrative    Not on file     Social Determinants of Health     Financial Resource Strain: Not on file   Food Insecurity: Not on file   Transportation Needs: Not on file   Physical Activity: Not on file   Stress: Not on file   Social Connections: Not on file   Housing Stability: Not on file       Current Outpatient Medications   Medication Sig Dispense Refill    levothyroxine 75 MCG Oral Tab Take 1 tablet (75 mcg total) by mouth before breakfast. 90 tablet 1    predniSONE 20 MG Oral Tab Take 2 tablets once a day for 5 days 10 tablet 0    albuterol 108 (90 Base) MCG/ACT Inhalation Aero Soln Inhale 2 puffs into the lungs every 4 (four) hours as needed for Wheezing or Shortness of Breath. 18 g 5    guaiFENesin-Codeine 100-10 MG/5ML Oral Syrup Take 1 to 2  tsp every 6 hours for cough as needed. May cause drowsiness 180 mL 0    Spacer/Aero-Holding Chambers Does not apply Device Use with hfa inhaler as directed 1 each 0    Fenofibrate 134 MG Oral Cap Take 1 capsule by mouth daily.      naproxen (NAPROSYN) 500 MG Oral Tab Take 1 tablet (500 mg total) by mouth 2 (two) times daily with meals. Take for 2 weeks as directed and then as needed. 60 tablet 0    omega-3-acid ethyl esters 1 g Oral Cap Take two capsules twice a day 360 capsule 3    rosuvastatin 40 MG Oral Tab Take 1 tablet (40 mg total) by mouth nightly. 90 tablet 1    benzonatate 200 MG Oral Cap Take 1 capsule (200 mg total) by mouth 3 (three) times daily as needed for cough. (Patient not taking: Reported on 1/10/2024) 20 capsule 0        Allergies:  Allergies   Allergen Reactions    Milk-Related Compounds DIARRHEA    Penicillins DIARRHEA     Confirmed with patient never had true allergy only stomach upset and diarrhea        Physical Exam  Vitals and nursing note reviewed.   Constitutional:       Appearance: Normal appearance.   Cardiovascular:      Rate and Rhythm: Normal rate.   Musculoskeletal:         General: Tenderness present.        Arms:         Hands:         Legs:    Neurological:      Mental Status: She is alert.         Assessment and Plan:  Problem List Items Addressed This Visit       Arthralgia of multiple joints - Primary    Relevant Orders    Connective Tissue Disease (HENRY) Screen, Reflex Specific Antibody    C-Reactive Protein    Cyclic Citrullinate Pep. IGG    Rheumatoid Arthritis Factor    Sed Rate, Westergren (Automated)    RHEUMATOLOGY - INTERNAL    Mixed hyperlipidemia    Relevant Orders    CT CALCIUM SCORING    Screening for heart disease    Relevant Orders    CT CALCIUM SCORING    Subcutaneous nodule of left hand    Relevant Orders    PLASTIC SURGERY - INTERNAL                   Discussed plan of care with pt and pt is in agreement.All questions answered. Pt to call with questions or concerns.    Encouraged to sign up for My Chart if not already registered.

## 2024-02-26 LAB
CCP IGG SERPL-ACNC: 0.8 U/ML (ref 0–6.9)
DSDNA IGG SERPL IA-ACNC: <0.6 IU/ML
ENA AB SER QL IA: <0.09 UG/L
ENA AB SER QL IA: NEGATIVE

## 2024-04-22 ENCOUNTER — OFFICE VISIT (OUTPATIENT)
Dept: PODIATRY CLINIC | Facility: CLINIC | Age: 64
End: 2024-04-22

## 2024-04-22 DIAGNOSIS — M76.62 TENDONITIS, ACHILLES, LEFT: Primary | ICD-10-CM

## 2024-04-22 DIAGNOSIS — M21.6X2 EQUINUS DEFORMITY OF LEFT FOOT: ICD-10-CM

## 2024-04-22 PROCEDURE — 99213 OFFICE O/P EST LOW 20 MIN: CPT | Performed by: PODIATRIST

## 2024-04-22 NOTE — PROGRESS NOTES
[facility]  Progress Note    Merary Mckeon is a 63 year old female.   Chief Complaint   Patient presents with    Ankle Pain     L ankle f/u -  Pt states when going up steps, it feels like something is stuck . No pain.         HPI:     This is a pleasant female with HTN.    She presents to clinic today due to left Achilles tendon pain f/u.  She does note improvement since last visit.       Allergies: Milk-related compounds and Penicillins   Current Outpatient Medications   Medication Sig Dispense Refill    levothyroxine 75 MCG Oral Tab Take 1 tablet (75 mcg total) by mouth before breakfast. 90 tablet 1    predniSONE 20 MG Oral Tab Take 2 tablets once a day for 5 days 10 tablet 0    albuterol 108 (90 Base) MCG/ACT Inhalation Aero Soln Inhale 2 puffs into the lungs every 4 (four) hours as needed for Wheezing or Shortness of Breath. 18 g 5    guaiFENesin-Codeine 100-10 MG/5ML Oral Syrup Take 1 to 2  tsp every 6 hours for cough as needed. May cause drowsiness 180 mL 0    Spacer/Aero-Holding Chambers Does not apply Device Use with hfa inhaler as directed 1 each 0    Fenofibrate 134 MG Oral Cap Take 1 capsule by mouth daily.      naproxen (NAPROSYN) 500 MG Oral Tab Take 1 tablet (500 mg total) by mouth 2 (two) times daily with meals. Take for 2 weeks as directed and then as needed. 60 tablet 0    omega-3-acid ethyl esters 1 g Oral Cap Take two capsules twice a day 360 capsule 3    rosuvastatin 40 MG Oral Tab Take 1 tablet (40 mg total) by mouth nightly. 90 tablet 1    benzonatate 200 MG Oral Cap Take 1 capsule (200 mg total) by mouth 3 (three) times daily as needed for cough. (Patient not taking: Reported on 4/22/2024) 20 capsule 0      Past Medical History:    Disorder of thyroid    hypothyroid    Visual impairment    reading glasses      Past Surgical History:   Procedure Laterality Date    Colonoscopy      Egd      Total abdominal hysterectomy N/A 11/28/2018    Procedure: ABDOMINAL  HYSTERECTOMY;  Surgeon: Yared Berry MD;  Location: [facility] MAIN OR      Family History   Problem Relation Age of Onset    Cancer Father         thyroid cancer    Diabetes Mother     Breast Cancer Mother 60    Breast Cancer Sister 50      Social History     Socioeconomic History    Marital status: Single   Tobacco Use    Smoking status: Former     Types: Cigarettes    Smokeless tobacco: Never    Tobacco comments:     Social smoking   Vaping Use    Vaping status: Never Used   Substance and Sexual Activity    Alcohol use: No    Drug use: No           REVIEW OF SYSTEMS:   Denies nausea, fever, chills  No calf pain  No other muscle or joint aches  Denies chest pain or shortness of breath.      EXAM:   LMP 11/20/2010     Constitution: Well-developed and well-nourished. Gait appears normal. No apparent distress. Alert and oriented to person, place, and time.  Integument: There are no varicosities. Skin appears moist, warm, and supple with positive hair growth. There are no color changes. No open lesions. No macerations, No Hyperkeratotic lesions.  Vascular examination: Dorsalis pedis and posterior tibial pulses are strong bilaterally with capillary filling time less than 3 seconds to all digits. There is no peripheral edema.  Neurological Sensorium: Grossly intact to sharp/dull. Vibratory: Intact.  Musculoskeletal:   5/5 pedal muscle strength b/l.  Pain on compression to Left Achilles tendon mid substance with no palpable dell, improved  Decreased left ankle DF with knee extended approx 0 degrees       LABS & IMAGING:     Lab Results   Component Value Date     (H) 01/27/2024    BUN 16 01/27/2024    CREATSERUM 0.84 01/27/2024    BUNCREA 19.0 01/27/2024    ANIONGAP 6 01/27/2024    GFRAA 99 12/28/2021    GFRNAA 86 12/28/2021    CA 9.3 01/27/2024     01/27/2024    K 4.3 01/27/2024     01/27/2024    CO2 27.0 01/27/2024    OSMOCALC 297 (H) 01/27/2024        Lab Results   Component Value Date     (H)  01/27/2024    A1C 6.1 (H) 01/27/2024        No results found.     ASSESSMENT AND PLAN:   Diagnoses and all orders for this visit:    Tendonitis, Achilles, left    Equinus deformity of left foot          Plan:     Discussed the importance of daily stretching- stretch 5 times per day continued.  Discussed conservative management and potential for formal PT.  Discussed possible oral steroids if patient is not diabetic, otherwise use of NSAIDS if no history of GERD or stomach upset.  Recommend cryotherapy/icing.  Discussed the importance of rest and the need for immobilization.    Xray left ankle: 1/26/24: 1. No acute fracture or dislocation.   2. Mild hindfoot degeneration.  Small plantar calcaneal enthesophyte     Due to only mild pain will hold off on cam boot left LE  Cont b/l gel heel cups, pt to wear when ambulating  Pt would like to hold off on PT at this time    RTC PRN.  If no improvement will re discuss PT      No follow-ups on file.    Filipe Ferreira DPM  4/22/24

## 2024-05-09 ENCOUNTER — HOSPITAL ENCOUNTER (OUTPATIENT)
Dept: CT IMAGING | Age: 64
Discharge: HOME | End: 2024-05-09
Attending: NURSE PRACTITIONER

## 2024-05-09 DIAGNOSIS — Z13.6 SCREENING FOR HEART DISEASE: ICD-10-CM

## 2024-05-09 DIAGNOSIS — E78.2 MIXED HYPERLIPIDEMIA: ICD-10-CM

## 2024-05-09 LAB
POCT GLUCOSE CHOLESTECH: 86 (ref 70–140)
POCT HDL: 55 (ref 55–60)
POCT LDL: 58 (ref 0–99)
POCT TOTAL CHOLESTEROL: 141 (ref 110–200)
POCT TRIGLYCERIDES: 142 (ref 1–149)

## 2024-05-09 NOTE — PROGRESS NOTES
Date of Service 5/9/2024    DANII FENTON  Date of Birth 5/6/1960    Patient Age: 64 year old    PCP: Ervin Andrade DO  [address] [place] [address]  [address]  [facility] [address]    Heart Scan Consult  Preliminary Heart Scan Score: 12.7    Previous Screening  Heart Scan Completed Previously: No                   Risk Factors  Personal Risk Factors  Alterable Risk Factors: Abnormal Cholesterol;Pre-Diabetes      Body Mass Index  BMI 22    Blood Pressure  /87 no med.  (Normal =< 120/80,  Elevated = 120-129/ >80,  High Stage1 130-139/80-89 , Stage2 >140/>90)    Lipid Profile  Patient was in fasting state: No    Cholesterol: 141, done on 5/9/2024.  HDL Cholesterol: 55, done on 5/9/2024.  LDL Cholesterol: 58, done on 5/9/2024.  TriGlycerides 142, done on 5/9/2024.  She is on a statin and med for triglycerides.    Cholesterol Goals  Value   Total  =< 200   HDL  = > 45 Men = > 55 Women   LDL   =< 100   Triglycerides  =< 150       Glucose and Hemoglobin A1C  Pre diabetes, not on med.  Lab Results   Component Value Date     (H) 01/27/2024    A1C 6.1 (H) 01/27/2024     (Normal Fasting Glucose < 100mg/dl )    Nurse Review  Risk factor information and results reviewed with Nurse: Yes    Recommended Follow Up:  Consult your physician regarding:: Final Heart Scan Report;Discuss potential for Incidental Finding      Recommendations for Change:  Nutrition Changes: Low Saturated Fat    Cholesterol Modification (goal of therapy depends upon your risk): No Change Needed    Exercise: Enhance Current Program    Smoking Cessation: > 1 Year Ago    Weight Management: Decrease Current Weight    Stress Management: Adopt Stress Management Techniques    Repeat Heart Scan: 3 Years if Calcium Score is > 0.0;Discuss with your Physician              [place] Recommended Resources:  Recommended Resources: Upcoming Classes, Medical Services and Health Library [email or web address]            Malaika PARISH RN        Please Contact the Nurse Heart  Line with any Questions or Concerns [phone].

## 2024-05-16 ENCOUNTER — TELEPHONE (OUTPATIENT)
Dept: SURGERY | Facility: CLINIC | Age: 64
End: 2024-05-16

## 2024-05-16 ENCOUNTER — OFFICE VISIT (OUTPATIENT)
Dept: SURGERY | Facility: CLINIC | Age: 64
End: 2024-05-16

## 2024-05-16 DIAGNOSIS — G56.03 BILATERAL CARPAL TUNNEL SYNDROME: ICD-10-CM

## 2024-05-16 DIAGNOSIS — M72.0 DUPUYTREN CONTRACTURE: Primary | ICD-10-CM

## 2024-05-16 PROCEDURE — 99204 OFFICE O/P NEW MOD 45 MIN: CPT | Performed by: PLASTIC SURGERY

## 2024-05-16 RX ORDER — INDOMETHACIN 25 MG/1
25 CAPSULE ORAL
Qty: 21 CAPSULE | Refills: 0 | Status: SHIPPED | OUTPATIENT
Start: 2024-05-16 | End: 2024-05-23

## 2024-05-16 NOTE — H&P
Merary Mckeon is a 64 year old female that presents with   Chief Complaint   Patient presents with    Carpal Tunnel Syndrome     Bilateral  Dupuytren's left   .    REFERRED BY:  Alexandra Bacon     Pacemaker: No  Latex Allergy: no  Coumadin: No  Plavix: No  Other anticoagulants: No  Diet medication: No  Cardiac stents: No    HAND DOMINANCE:  Right    Profession: clean houses    RECONSTRUCTIVE HISTORY    SUN EXPOSURE   Current no   Past no   Sunburns no   Tanning salons current no   Tanning salons past no     SKIN CANCER    Personal history of skin cancer: none      HPI:       64-year-old female right-hand-dominant with bilateral numbness and tingling of the hands    1 year duration, right equals left    Intermittent numbness and tingling of all digits  Intermittent painful cramping especially when she uses the hands    Occasional pain in the volar LRF    No night symptoms    Hands are weak          Review of Systems:   Constitutional: No change in appetite, chill/rigors, or fatigue  GI: No jaundice  Endocrine: No generalized weakness  Neurological: No aphasia, loss of consciousness, or seizures    Musculoskeletal:     Duration    1 year    NUMBNESS / TINGLING      RIGHT  Intermittent  thumb  index finger  middle finger  ring finger  small finger      LEFT  Intermittent  thumb  index finger  middle finger  ring finger  small finger    Which hand is worse?  bilateral     Pain:    Yes 4/10 intermittent pain describes as electrifying    Complains of pain volar LRF has cording and nodule     Night symptoms:  No    Functional problems: Yes weakness     Previous therapy:  No         PMH:     MEDICAL  Past Medical History:    Disorder of thyroid    hypothyroid    Visual impairment    reading glasses        SURGICAL  Past Surgical History:   Procedure Laterality Date    Colonoscopy      Egd      Total abdominal hysterectomy N/A 11/28/2018    Procedure: ABDOMINAL HYSTERECTOMY;  Surgeon: Yared Berry MD;   Location: [facility] MAIN OR        ALLERIGIES  Allergies   Allergen Reactions    Milk-Related Compounds DIARRHEA    Penicillins DIARRHEA     Confirmed with patient never had true allergy only stomach upset and diarrhea         MEDICATIONS  Current Outpatient Medications   Medication Sig Dispense Refill    levothyroxine 75 MCG Oral Tab Take 1 tablet (75 mcg total) by mouth before breakfast. 90 tablet 1    Fenofibrate 134 MG Oral Cap Take 1 capsule by mouth daily.      rosuvastatin 40 MG Oral Tab Take 1 tablet (40 mg total) by mouth nightly. 90 tablet 1    albuterol 108 (90 Base) MCG/ACT Inhalation Aero Soln Inhale 2 puffs into the lungs every 4 (four) hours as needed for Wheezing or Shortness of Breath. (Patient not taking: Reported on 5/16/2024) 18 g 5    Spacer/Aero-Holding Chambers Does not apply Device Use with hfa inhaler as directed (Patient not taking: Reported on 5/16/2024) 1 each 0    omega-3-acid ethyl esters 1 g Oral Cap Take two capsules twice a day (Patient not taking: Reported on 5/16/2024) 360 capsule 3        SOCIAL HISTORY  Social History     Socioeconomic History    Marital status: Single   Tobacco Use    Smoking status: Former     Types: Cigarettes    Smokeless tobacco: Never    Tobacco comments:     Social smoking   Vaping Use    Vaping status: Never Used   Substance and Sexual Activity    Alcohol use: No    Drug use: No   Other Topics Concern    Right Handed Yes        FAMILY HISTORY  Family History   Problem Relation Age of Onset    Cancer Father         thyroid cancer    Diabetes Mother     Breast Cancer Mother 60    Breast Cancer Sister 50          PHYSICAL EXAM:     CONSTITUTIONAL: Overall appearance - Normal  HEENT: Normocephalic  EYES: Conjunctiva - Right: Normal, Left: Normal; EOMI  EARS: Inspection - Right: Normal, Left: Normal  NECK/THYROID: Inspection - Normal, Palpation - Normal, Thyroid gland - Normal, No adenopathy  RESPIRATORY: Inspection - Normal, Effort - Normal  CARDIOVASCULAR: Regular  rhythm, No murmurs  ABDOMEN: Inspection - Normal, No abdominal tenderness  NEURO: Memory intact  PSYCH: Oriented to person, place, time, and situation, Appropriate mood and affect        Hand Physical Exam:    ROM: bilateral full painless  Wrist Hyperextension: bilateral excellent  Thenar Intrinsics: bilateral intact/symmetric  Ulnar Intrinsics: bilateral intact/symmetric  Wrist Tenderness: bilateral none  Sensation: bilateral grossly intact    Median Nerve Compression: bilateral negative    Tinel Median at Wrist: Right negative, left thumb    Phalan: Right negative      LRF pretendinous and central nodules and cords, nontender  MP 0 compared to +30  Full flexion    ASSESSMENT/PLAN:       CARPAL TUNNEL SYNDROME  bilateral        DISPOSITION    We had a long discussion.  I explained to the patient what carpal tunnel syndrome is including treatment options.  The patient  may not have relief of symptoms with treatment.      This is a mild carpal tunnel syndrome  Non-operative therapy may help, but surgery may still be necessary.  SPLINT bilateral night wrist splints  Indocin 25 3 times daily for 1 week    This does not need surgery at this point    If she is not improved in 1 month she will see physiatry    DUPUYPRITESH'S LRF, asymptomatic      We discussed what Dupuytren's is, including treatment options.  Questions were answered and the patient wishes to proceed with treatment.     The Dupuytren's  left is asymptomatic / minimally symptomatic.  Surgery is not indicated at this point.  If it becomes symptomatic (eg, pain, progression, contracture, functional problems), I would recommend palmar fasciectomy.    This does not need surgery at this point    5/16/2024  Shahzad Edwards MD      +++++++++++++++++++++++++++++++++++++++++      MEDICAL DECISION MAKING    PROBLEMS      MODERATE    (number / complexity)          3 new illnesses with uncertain prognosis    DATA         STRAIGHTFORWARD    (amount /  complexity)           MANAGEMENT RISK  MODERATE    (complications/ morbidity)       Decision regarding major surgery                  MDM LEVEL    MODERATE

## 2024-05-16 NOTE — PROGRESS NOTES
Per Dr Edwards, pt fitted for and given bilateral size small wrist splints for night wear only.  Pt verbalized splints fit comfortably.

## 2024-05-16 NOTE — TELEPHONE ENCOUNTER
[facility] for pt to call office, [phone].  Per Dr Edwards, wants pt to have bilateral wrist splints for night wear only.  Please call the office to arrange for  from our office.

## 2024-09-06 ENCOUNTER — LAB ENCOUNTER (OUTPATIENT)
Dept: LAB | Age: 64
End: 2024-09-06
Attending: NURSE PRACTITIONER
Payer: COMMERCIAL

## 2024-09-06 ENCOUNTER — OFFICE VISIT (OUTPATIENT)
Dept: FAMILY MEDICINE CLINIC | Facility: CLINIC | Age: 64
End: 2024-09-06

## 2024-09-06 VITALS
RESPIRATION RATE: 18 BRPM | HEIGHT: 65 IN | WEIGHT: 133.25 LBS | SYSTOLIC BLOOD PRESSURE: 132 MMHG | HEART RATE: 68 BPM | DIASTOLIC BLOOD PRESSURE: 79 MMHG | BODY MASS INDEX: 22.2 KG/M2

## 2024-09-06 DIAGNOSIS — E03.9 ACQUIRED HYPOTHYROIDISM: ICD-10-CM

## 2024-09-06 DIAGNOSIS — J02.9 SORE THROAT: Primary | ICD-10-CM

## 2024-09-06 DIAGNOSIS — E78.2 MIXED HYPERLIPIDEMIA: ICD-10-CM

## 2024-09-06 DIAGNOSIS — J02.0 STREP PHARYNGITIS: ICD-10-CM

## 2024-09-06 DIAGNOSIS — L98.9 SKIN LESIONS: ICD-10-CM

## 2024-09-06 LAB
ALBUMIN SERPL-MCNC: 4.4 G/DL (ref 3.2–4.8)
ALBUMIN/GLOB SERPL: 1.8 {RATIO} (ref 1–2)
ALP LIVER SERPL-CCNC: 90 U/L
ALT SERPL-CCNC: 22 U/L
ANION GAP SERPL CALC-SCNC: 7 MMOL/L (ref 0–18)
AST SERPL-CCNC: 21 U/L (ref ?–34)
BILIRUB SERPL-MCNC: 0.5 MG/DL (ref 0.2–1.1)
BUN BLD-MCNC: 15 MG/DL (ref 9–23)
BUN/CREAT SERPL: 17.9 (ref 10–20)
CALCIUM BLD-MCNC: 9.2 MG/DL (ref 8.7–10.4)
CHLORIDE SERPL-SCNC: 110 MMOL/L (ref 98–112)
CHOLEST SERPL-MCNC: 222 MG/DL (ref ?–200)
CO2 SERPL-SCNC: 27 MMOL/L (ref 21–32)
CONTROL LINE PRESENT WITH A CLEAR BACKGROUND (YES/NO): YES YES/NO
CREAT BLD-MCNC: 0.84 MG/DL
EGFRCR SERPLBLD CKD-EPI 2021: 78 ML/MIN/1.73M2 (ref 60–?)
FASTING PATIENT LIPID ANSWER: NO
FASTING STATUS PATIENT QL REPORTED: NO
GLOBULIN PLAS-MCNC: 2.4 G/DL (ref 2–3.5)
GLUCOSE BLD-MCNC: 83 MG/DL (ref 70–99)
HDLC SERPL-MCNC: 46 MG/DL (ref 40–59)
KIT LOT #: 7282 NUMERIC
LDLC SERPL CALC-MCNC: 133 MG/DL (ref ?–100)
NONHDLC SERPL-MCNC: 176 MG/DL (ref ?–130)
OSMOLALITY SERPL CALC.SUM OF ELEC: 298 MOSM/KG (ref 275–295)
POTASSIUM SERPL-SCNC: 3.9 MMOL/L (ref 3.5–5.1)
PROT SERPL-MCNC: 6.8 G/DL (ref 5.7–8.2)
SODIUM SERPL-SCNC: 144 MMOL/L (ref 136–145)
STREP GRP A CUL-SCR: POSITIVE
TRIGL SERPL-MCNC: 243 MG/DL (ref 30–149)
TSI SER-ACNC: 2.8 MIU/ML (ref 0.55–4.78)
VLDLC SERPL CALC-MCNC: 45 MG/DL (ref 0–30)

## 2024-09-06 PROCEDURE — 99214 OFFICE O/P EST MOD 30 MIN: CPT | Performed by: NURSE PRACTITIONER

## 2024-09-06 PROCEDURE — 80053 COMPREHEN METABOLIC PANEL: CPT

## 2024-09-06 PROCEDURE — 3078F DIAST BP <80 MM HG: CPT | Performed by: NURSE PRACTITIONER

## 2024-09-06 PROCEDURE — 3008F BODY MASS INDEX DOCD: CPT | Performed by: NURSE PRACTITIONER

## 2024-09-06 PROCEDURE — 3075F SYST BP GE 130 - 139MM HG: CPT | Performed by: NURSE PRACTITIONER

## 2024-09-06 PROCEDURE — 87880 STREP A ASSAY W/OPTIC: CPT | Performed by: NURSE PRACTITIONER

## 2024-09-06 PROCEDURE — 80061 LIPID PANEL: CPT

## 2024-09-06 PROCEDURE — 36415 COLL VENOUS BLD VENIPUNCTURE: CPT

## 2024-09-06 PROCEDURE — 84443 ASSAY THYROID STIM HORMONE: CPT

## 2024-09-06 RX ORDER — LEVOTHYROXINE SODIUM 75 UG/1
75 TABLET ORAL
Qty: 90 TABLET | Refills: 1 | Status: SHIPPED | OUTPATIENT
Start: 2024-09-06

## 2024-09-06 RX ORDER — ROSUVASTATIN CALCIUM 40 MG/1
40 TABLET, COATED ORAL NIGHTLY
Qty: 90 TABLET | Refills: 1 | Status: SHIPPED | OUTPATIENT
Start: 2024-09-06

## 2024-09-06 RX ORDER — CLARITHROMYCIN 500 MG
500 TABLET ORAL 2 TIMES DAILY
Qty: 14 TABLET | Refills: 0 | Status: SHIPPED | OUTPATIENT
Start: 2024-09-06 | End: 2024-09-13

## 2024-09-06 RX ORDER — FENOFIBRATE 134 MG/1
1 CAPSULE ORAL DAILY
Qty: 30 CAPSULE | Refills: 0 | Status: SHIPPED | OUTPATIENT
Start: 2024-09-06

## 2024-09-06 RX ORDER — OMEGA-3-ACID ETHYL ESTERS 1 G/1
CAPSULE, LIQUID FILLED ORAL
Qty: 360 CAPSULE | Refills: 3 | Status: SHIPPED | OUTPATIENT
Start: 2024-09-06

## 2024-09-06 NOTE — ASSESSMENT & PLAN NOTE
Refill crestor, fenofibrate and omega 3s  Lipid panel  Please aim to eat a diet high in fresh fruits and vegetables, lean protein sources, complex carbohydrates and limited processed and fast foods.  Try to get at least 150 minutes of exercise per week-a combination of weight resistance and cardio is preferred.

## 2024-09-06 NOTE — PROGRESS NOTES
Medication Request  Associated symptoms include congestion, coughing, fatigue, a fever and a sore throat. Pertinent negatives include no abdominal pain or chest pain.   Sore Throat   Associated symptoms include congestion and coughing. Pertinent negatives include no abdominal pain, shortness of breath or trouble swallowing.   Cough  Associated symptoms include a fever, rhinorrhea and a sore throat. Pertinent negatives include no chest pain, shortness of breath or wheezing.     Pt here for fever, sore throat and cough for the past couple of days. Has been doing home treatments without improvement in symptoms.  Son and grandchild were ill.     Has been without fenofibrate for 1 week.     Review of Systems   Constitutional:  Positive for activity change, fatigue and fever.   HENT:  Positive for congestion, rhinorrhea and sore throat. Negative for trouble swallowing.    Respiratory:  Positive for cough. Negative for chest tightness, shortness of breath and wheezing.    Cardiovascular:  Negative for chest pain.   Gastrointestinal:  Negative for abdominal pain.   Skin:  Positive for color change.        Has noticed scattered skin lesions of different shapes and sizes on skin-would like referral for derm       Vitals:    09/06/24 1315   BP: 132/79   Pulse: 68   Resp: 18   Weight: 133 lb 4 oz (60.4 kg)   Height: 5' 5\" (1.651 m)     Body mass index is 22.17 kg/m².  Wt Readings from Last 6 Encounters:   09/06/24 133 lb 4 oz (60.4 kg)   02/23/24 136 lb (61.7 kg)   01/26/24 134 lb (60.8 kg)   01/10/24 135 lb (61.2 kg)   04/20/23 140 lb (63.5 kg)   02/15/23 133 lb (60.3 kg)        Health Maintenance   Topic Date Due    DTaP,Tdap,and Td Vaccines (1 - Tdap) Never done    Mammogram  01/27/2023    COVID-19 Vaccine (3 - 2023-24 season) 09/01/2024    Influenza Vaccine (1) 10/01/2024    Annual Physical  01/26/2025    Colorectal Cancer Screening  01/18/2032    Annual Depression Screening  Completed    Zoster Vaccines  Completed     Pneumococcal Vaccine: Birth to 64yrs  Aged Out       Patient's last menstrual period was 11/20/2010.    Past Medical History:    Disorder of thyroid    hypothyroid    Visual impairment    reading glasses       .  Past Surgical History:   Procedure Laterality Date    Colonoscopy      Egd      Total abdominal hysterectomy N/A 11/28/2018    Procedure: ABDOMINAL HYSTERECTOMY;  Surgeon: Yared Berry MD;  Location: [facility] MAIN OR       Family History   Problem Relation Age of Onset    Cancer Father         thyroid cancer    Diabetes Mother     Breast Cancer Mother 60    Breast Cancer Sister 50       Social History     Socioeconomic History    Marital status: Single     Spouse name: Not on file    Number of children: Not on file    Years of education: Not on file    Highest education level: Not on file   Occupational History    Not on file   Tobacco Use    Smoking status: Former     Types: Cigarettes    Smokeless tobacco: Never    Tobacco comments:     Social smoking   Vaping Use    Vaping status: Never Used   Substance and Sexual Activity    Alcohol use: No    Drug use: No    Sexual activity: Not on file   Other Topics Concern    Left Handed Not Asked    Right Handed Yes    Currently spends a great deal of time in the sun Not Asked    Past Sunlamp Treatments for Acne Not Asked    History of tanning Not Asked    Hx of Spending Great Deal of Time in Sun Not Asked    Bad sunburns in the past Not Asked    Tanning Salons in the Past Not Asked    Hx of Radiation Treatments Not Asked    Regular use of sun block Not Asked   Social History Narrative    Not on file     Social Determinants of Health     Financial Resource Strain: Not on file   Food Insecurity: Not on file   Transportation Needs: Not on file   Physical Activity: Not on file   Stress: Not on file   Social Connections: Not on file   Housing Stability: Not on file       Current Outpatient Medications   Medication Sig Dispense Refill    Fenofibrate 134 MG Oral Cap Take  1 capsule by mouth daily. 30 capsule 0    rosuvastatin 40 MG Oral Tab Take 1 tablet (40 mg total) by mouth nightly. 90 tablet 1    omega-3-acid ethyl esters 1 g Oral Cap Take two capsules twice a day 360 capsule 3    levothyroxine 75 MCG Oral Tab Take 1 tablet (75 mcg total) by mouth before breakfast. 90 tablet 1    clarithromycin 500 MG Oral Tab Take 1 tablet (500 mg total) by mouth 2 (two) times daily for 7 days. 14 tablet 0    albuterol 108 (90 Base) MCG/ACT Inhalation Aero Soln Inhale 2 puffs into the lungs every 4 (four) hours as needed for Wheezing or Shortness of Breath. 18 g 5    Spacer/Aero-Holding Chambers Does not apply Device Use with hfa inhaler as directed (Patient not taking: Reported on 9/6/2024) 1 each 0       Allergies:  Allergies   Allergen Reactions    Milk-Related Compounds DIARRHEA    Penicillins DIARRHEA     Confirmed with patient never had true allergy only stomach upset and diarrhea        Physical Exam  Vitals and nursing note reviewed.   Constitutional:       Appearance: Normal appearance.   HENT:      Head: Normocephalic.      Nose: Congestion present.      Mouth/Throat:      Pharynx: Oropharyngeal exudate and posterior oropharyngeal erythema present.   Eyes:      Conjunctiva/sclera: Conjunctivae normal.   Cardiovascular:      Rate and Rhythm: Normal rate and regular rhythm.      Heart sounds: Normal heart sounds.   Pulmonary:      Effort: Pulmonary effort is normal. No respiratory distress.      Breath sounds: Normal breath sounds.   Skin:     General: Skin is warm and dry.      Comments: Cherry colored lesion-abdominal  Scaly black lesion lateral right calf   Neurological:      Mental Status: She is alert and oriented to person, place, and time.       Quick strep positive   Assessment and Plan:  Problem List Items Addressed This Visit       Acquired hypothyroidism     Tsh  Refill levothyroxine         Relevant Medications    Fenofibrate 134 MG Oral Cap    rosuvastatin 40 MG Oral Tab     omega-3-acid ethyl esters 1 g Oral Cap    levothyroxine 75 MCG Oral Tab    Other Relevant Orders    TSH W Reflex To Free T4    Comp Metabolic Panel (14)    Mixed hyperlipidemia     Refill crestor, fenofibrate and omega 3s  Lipid panel  Please aim to eat a diet high in fresh fruits and vegetables, lean protein sources, complex carbohydrates and limited processed and fast foods.  Try to get at least 150 minutes of exercise per week-a combination of weight resistance and cardio is preferred.             Relevant Medications    Fenofibrate 134 MG Oral Cap    rosuvastatin 40 MG Oral Tab    omega-3-acid ethyl esters 1 g Oral Cap    Other Relevant Orders    Lipid Panel    Comp Metabolic Panel (14)    Skin lesions    Relevant Orders    DERM - INTERNAL    Sore throat - Primary    Relevant Orders    Strep A Assay W/Optic (Completed)    Strep pharyngitis     Quick strep positive  Clarithromycin 500 mg I po twice a day x 7 days           Relevant Medications    clarithromycin 500 MG Oral Tab                   Discussed plan of care with pt and pt is in agreement.All questions answered. Pt to call with questions or concerns.    Encouraged to sign up for My Chart if not already registered.     Note to patient and family:  The 21st Century Cures Act makes medical notes available to patients in the interest of transparency.  However, please be advised that this is a medical document.  It is intended as a peer to peer communication.  It is written in medical language and may contain abbreviations or verbiage that are technical and unfamiliar.  It may appear blunt or direct.  Medical documents are intended to carry relevant information, facts as evident, and the clinical opinion of the practitioner.

## 2024-09-07 DIAGNOSIS — E78.2 MIXED HYPERLIPIDEMIA: ICD-10-CM

## 2024-09-07 DIAGNOSIS — E03.9 ACQUIRED HYPOTHYROIDISM: Primary | ICD-10-CM

## 2024-09-12 ENCOUNTER — OFFICE VISIT (OUTPATIENT)
Dept: FAMILY MEDICINE CLINIC | Facility: CLINIC | Age: 64
End: 2024-09-12

## 2024-09-12 ENCOUNTER — HOSPITAL ENCOUNTER (OUTPATIENT)
Dept: GENERAL RADIOLOGY | Age: 64
Discharge: HOME | End: 2024-09-12
Payer: COMMERCIAL

## 2024-09-12 VITALS
SYSTOLIC BLOOD PRESSURE: 123 MMHG | BODY MASS INDEX: 22.3 KG/M2 | TEMPERATURE: 98 F | OXYGEN SATURATION: 97 % | DIASTOLIC BLOOD PRESSURE: 74 MMHG | HEART RATE: 56 BPM | HEIGHT: 65 IN | WEIGHT: 133.81 LBS

## 2024-09-12 DIAGNOSIS — R05.9 COUGH, UNSPECIFIED TYPE: ICD-10-CM

## 2024-09-12 DIAGNOSIS — R07.89 CHEST TIGHTNESS: ICD-10-CM

## 2024-09-12 DIAGNOSIS — J02.0 STREP PHARYNGITIS: ICD-10-CM

## 2024-09-12 DIAGNOSIS — J40 BRONCHITIS: ICD-10-CM

## 2024-09-12 DIAGNOSIS — R09.89 CHEST CONGESTION: ICD-10-CM

## 2024-09-12 DIAGNOSIS — J02.9 SORE THROAT: Primary | ICD-10-CM

## 2024-09-12 PROCEDURE — 71046 X-RAY EXAM CHEST 2 VIEWS: CPT

## 2024-09-12 PROCEDURE — 3008F BODY MASS INDEX DOCD: CPT

## 2024-09-12 PROCEDURE — 3078F DIAST BP <80 MM HG: CPT

## 2024-09-12 PROCEDURE — 99213 OFFICE O/P EST LOW 20 MIN: CPT

## 2024-09-12 PROCEDURE — 3074F SYST BP LT 130 MM HG: CPT

## 2024-09-12 RX ORDER — PREDNISONE 20 MG/1
TABLET ORAL
Qty: 10 TABLET | Refills: 0 | Status: SHIPPED | OUTPATIENT
Start: 2024-09-12

## 2024-09-12 RX ORDER — CEFPODOXIME PROXETIL 200 MG/1
200 TABLET, FILM COATED ORAL 2 TIMES DAILY
Refills: 0 | Status: CANCELLED
Start: 2024-09-12 | End: 2024-09-19

## 2024-09-12 RX ORDER — BENZONATATE 100 MG/1
100 CAPSULE ORAL 3 TIMES DAILY PRN
Qty: 30 CAPSULE | Refills: 0 | Status: SHIPPED | OUTPATIENT
Start: 2024-09-12

## 2024-09-12 RX ORDER — AZITHROMYCIN 250 MG/1
TABLET, FILM COATED ORAL
Qty: 6 TABLET | Refills: 0 | Status: SHIPPED | OUTPATIENT
Start: 2024-09-12 | End: 2024-09-17

## 2024-09-12 RX ORDER — ALBUTEROL SULFATE 90 UG/1
2 AEROSOL, METERED RESPIRATORY (INHALATION) EVERY 4 HOURS PRN
Qty: 18 G | Refills: 5 | Status: SHIPPED | OUTPATIENT
Start: 2024-09-12

## 2024-09-12 NOTE — PROGRESS NOTES
Subjective:   Merary Mckeon is a 64 year old female who presents for Cough (Past 7 days, phlegm : green, can't talk causes to cough, Strep: positive, taking antibiotics clarithromycin  )   Patient is a pleasant 64-year-old female with past medical history consistent for hyperlipidemia, hypertension, hypothyroidism, vitamin D deficiency, and allergic rhinitis.  Patient presents to office today new to this provider for follow-up from recent visit with REGINE Ma on 9/6/2024.  Patient presented with sore throat and congestion rapid strep was positive in office and she was started on clarithromycin 500 mg twice daily x 7 days.  Patient follows up in office today and states she cannot breath. She has cough and congestion. She has green sputum, she feels chest tightness. She is a maid. She has not thrown her toothbrush. She has taken cepacol and dayquil. She has been checking her temperature at home and 103 and 101 the last few days. She is home alone.            Past Medical History:    Disorder of thyroid    hypothyroid    Visual impairment    reading glasses      Past Surgical History:   Procedure Laterality Date    Colonoscopy      Egd      Total abdominal hysterectomy N/A 11/28/2018    Procedure: ABDOMINAL HYSTERECTOMY;  Surgeon: Yared Berry MD;  [facility] MAIN OR        History/Other:    Chief Complaint Reviewed and Verified  Nursing Notes Reviewed and   Verified  Tobacco Reviewed  Allergies Reviewed  Medications Reviewed    Problem List Reviewed  Medical History Reviewed  Surgical History   Reviewed  OB Status Reviewed  Family History Reviewed  Social History   Reviewed         Tobacco:  She smoked tobacco in the past but quit greater than 12 months ago.  Social History     Tobacco Use   Smoking Status Former    Types: Cigarettes    Passive exposure: Never   Smokeless Tobacco Never   Tobacco Comments    Social smoking        Current Outpatient Medications   Medication Sig  Dispense Refill    azithromycin (ZITHROMAX Z-YANI) 250 MG Oral Tab Take 2 tablets (500 mg total) by mouth daily for 1 day, THEN 1 tablet (250 mg total) daily for 4 days. 6 tablet 0    benzonatate (TESSALON PERLES) 100 MG Oral Cap Take 1 capsule (100 mg total) by mouth 3 (three) times daily as needed for cough. 30 capsule 0    predniSONE 20 MG Oral Tab Take 2 tablets once a day for 5 days 10 tablet 0    albuterol 108 (90 Base) MCG/ACT Inhalation Aero Soln Inhale 2 puffs into the lungs every 4 (four) hours as needed for Wheezing or Shortness of Breath. 18 g 5    Fenofibrate 134 MG Oral Cap Take 1 capsule by mouth daily. 30 capsule 0    rosuvastatin 40 MG Oral Tab Take 1 tablet (40 mg total) by mouth nightly. 90 tablet 1    omega-3-acid ethyl esters 1 g Oral Cap Take two capsules twice a day 360 capsule 3    levothyroxine 75 MCG Oral Tab Take 1 tablet (75 mcg total) by mouth before breakfast. 90 tablet 1    clarithromycin 500 MG Oral Tab Take 1 tablet (500 mg total) by mouth 2 (two) times daily for 7 days. 14 tablet 0    albuterol 108 (90 Base) MCG/ACT Inhalation Aero Soln Inhale 2 puffs into the lungs every 4 (four) hours as needed for Wheezing or Shortness of Breath. 18 g 5    Spacer/Aero-Holding Chambers Does not apply Device Use with hfa inhaler as directed (Patient not taking: Reported on 9/6/2024) 1 each 0         Review of Systems:  Review of Systems   Constitutional:  Positive for chills and fever. Negative for activity change and appetite change.   HENT:  Positive for congestion, rhinorrhea and sore throat. Negative for postnasal drip, tinnitus and voice change.    Eyes: Negative.    Respiratory:  Positive for cough and chest tightness. Negative for apnea, shortness of breath and wheezing.    Cardiovascular:  Negative for chest pain and leg swelling.   Gastrointestinal: Negative.  Negative for abdominal pain, anal bleeding, blood in stool, constipation, diarrhea, nausea and vomiting.   Genitourinary: Negative.   Negative for difficulty urinating, flank pain and menstrual problem.   Musculoskeletal: Negative.  Negative for joint swelling.   Skin: Negative.    Neurological: Negative.  Negative for dizziness and headaches.   Psychiatric/Behavioral: Negative.  Negative for agitation.          Objective:   /74   Pulse 56   Temp 98.1 °F (36.7 °C)   Ht 5' 5\" (1.651 m)   Wt 133 lb 12.8 oz (60.7 kg)   LMP 11/20/2010   SpO2 97%   BMI 22.27 kg/m²  Estimated body mass index is 22.27 kg/m² as calculated from the following:    Height as of this encounter: 5' 5\" (1.651 m).    Weight as of this encounter: 133 lb 12.8 oz (60.7 kg).  Physical Exam  Vitals and nursing note reviewed.   Constitutional:       General: She is not in acute distress.     Appearance: Normal appearance. She is well-developed and normal weight.   HENT:      Head: Normocephalic and atraumatic.      Right Ear: Tympanic membrane, ear canal and external ear normal. There is no impacted cerumen.      Left Ear: Tympanic membrane, ear canal and external ear normal. There is no impacted cerumen.      Nose: Congestion and rhinorrhea present.      Mouth/Throat:      Mouth: Mucous membranes are moist.      Pharynx: Posterior oropharyngeal erythema present. No oropharyngeal exudate.   Neck:      Thyroid: No thyromegaly.   Cardiovascular:      Rate and Rhythm: Normal rate and regular rhythm.      Pulses: Normal pulses.      Heart sounds: Normal heart sounds. No murmur heard.  Pulmonary:      Effort: Pulmonary effort is normal. No respiratory distress.      Breath sounds: Rhonchi present. No wheezing or rales.   Chest:      Chest wall: No tenderness.   Abdominal:      General: Bowel sounds are normal. There is no distension.      Palpations: Abdomen is soft.      Tenderness: There is no abdominal tenderness.   Musculoskeletal:         General: No tenderness. Normal range of motion.      Cervical back: Normal range of motion and neck supple.   Lymphadenopathy:       Cervical: No cervical adenopathy.   Skin:     General: Skin is warm and dry.      Capillary Refill: Capillary refill takes less than 2 seconds.      Findings: No rash.   Neurological:      Mental Status: She is alert and oriented to person, place, and time.      Coordination: Coordination normal.   Psychiatric:         Behavior: Behavior normal.         Thought Content: Thought content normal.         Judgment: Judgment normal.           Assessment & Plan:   1. Sore throat (Primary)  -     SARS-COV-22-ALINITY; Future; Expected date: 09/12/2024  -     SARS-COV-22-ALINITY  2. Strep pharyngitis  -     Azithromycin; Take 2 tablets (500 mg total) by mouth daily for 1 day, THEN 1 tablet (250 mg total) daily for 4 days.  Dispense: 6 tablet; Refill: 0  3. Cough, unspecified type  -     XR CHEST PA + LAT CHEST (CPT=71046); Future; Expected date: 09/12/2024  -     Benzonatate; Take 1 capsule (100 mg total) by mouth 3 (three) times daily as needed for cough.  Dispense: 30 capsule; Refill: 0  -     SARS-COV-22-ALINITY; Future; Expected date: 09/12/2024  -     SARS-COV-22-ALINITY  4. Chest tightness  -     XR CHEST PA + LAT CHEST (CPT=71046); Future; Expected date: 09/12/2024  5. Chest congestion  -     XR CHEST PA + LAT CHEST (CPT=71046); Future; Expected date: 09/12/2024  -     predniSONE; Take 2 tablets once a day for 5 days  Dispense: 10 tablet; Refill: 0  -     SARS-COV-22-ALINITY; Future; Expected date: 09/12/2024  -     SARS-COV-22-ALINITY  6. Bronchitis  -     XR CHEST PA + LAT CHEST (CPT=71046); Future; Expected date: 09/12/2024  -     predniSONE; Take 2 tablets once a day for 5 days  Dispense: 10 tablet; Refill: 0  Other orders  -     Albuterol Sulfate HFA; Inhale 2 puffs into the lungs every 4 (four) hours as needed for Wheezing or Shortness of Breath.  Dispense: 18 g; Refill: 5    1. Sore throat  Recent strep throat  Continues on clarithromycin bid  Never threw away tooth brush  Start z pack, throw toothbrush away at  24 hours.  Supportive measures reviewed.  Gargle salt water.  Rotate tylenol and motrin for pain.  OTC cepacol.   Increase fluid intake.     - SARS-CoV-2/Flu A and B/RSV by PCR ([facility]); Future  - SARS-CoV-2/Flu A and B/RSV by PCR ([facility])    2. Strep pharyngitis  Recent strep throat  Continues on clarithromycin bid  Never threw away tooth brush  Start z pack, throw toothbrush away at 24 hours.  Supportive measures reviewed.  Gargle salt water.  Rotate tylenol and motrin for pain.  OTC cepacol.   Increase fluid intake.   - azithromycin (ZITHROMAX Z-YNAI) 250 MG Oral Tab; Take 2 tablets (500 mg total) by mouth daily for 1 day, THEN 1 tablet (250 mg total) daily for 4 days.  Dispense: 6 tablet; Refill: 0    3. Cough, unspecified type  Cough continues and worsened  Has chest tightness with green sputum and fevers up to 103  Obtain CXR  Start zpack, albuterol, pred burst and prn tessalon perles  - XR CHEST PA + LAT CHEST (CPT=71046); Future  - benzonatate (TESSALON PERLES) 100 MG Oral Cap; Take 1 capsule (100 mg total) by mouth 3 (three) times daily as needed for cough.  Dispense: 30 capsule; Refill: 0  - SARS-CoV-2/Flu A and B/RSV by PCR ([facility]); Future  - SARS-CoV-2/Flu A and B/RSV by PCR ([facility])    4. Chest tightness  CXR today  Rescue albuterol  Pred burst  Red flags reviewed    - XR CHEST PA + LAT CHEST (CPT=71046); Future    5. Chest congestion  Potential sick contacts  Viral panel in office sent   Supportive measures reviewed and include rotating antipyretics, using humidifier, hot showers, hot tea with honey.  Increase fluid intake and rest.    - XR CHEST PA + LAT CHEST (CPT=71046); Future  - predniSONE 20 MG Oral Tab; Take 2 tablets once a day for 5 days  Dispense: 10 tablet; Refill: 0  - SARS-CoV-2/Flu A and B/RSV by PCR ([facility]); Future  - SARS-CoV-2/Flu A and B/RSV by PCR ([facility])    6. Bronchitis  Pred burst  Albuterol as needed  Tessalon perles  Follow up 1 week  Red flags reviewed  - XR CHEST PA +  LAT CHEST (CPT=71046); Future  - predniSONE 20 MG Oral Tab; Take 2 tablets once a day for 5 days  Dispense: 10 tablet; Refill: 0    Patient aware of plan of care. All questions answered to satisfaction of the patient. Patient instructed to call office or reach out via [employer] if any issues arise. For urgent issues and/or reviewed red flags please proceed to the urgent care or ER.  Also, inform the nurse practitioner with any new symptoms or medication side effects.          Return in about 1 week (around 9/19/2024).    REGINE Reagan, 9/12/2024, 9:57 AM

## 2024-09-13 LAB
FLUAV + FLUBV RNA SPEC NAA+PROBE: NOT DETECTED
FLUAV + FLUBV RNA SPEC NAA+PROBE: NOT DETECTED
RSV RNA SPEC NAA+PROBE: NOT DETECTED
SARS-COV-2 RNA RESP QL NAA+PROBE: NOT DETECTED

## 2024-09-16 NOTE — PROGRESS NOTES
Subjective:   Merary Mckeon is a 64 year old female who presents for Follow - Up (bronchitis, chest congestion, cough: feel lil better, throat feels like needles, cough on & off but due to throat )   Patient is a pleasant 64-year-old female with past medical history consistent for hyperlipidemia, hypertension, hypothyroidism, vitamin D deficiency, and allergic rhinitis.  Patient presents to office today for follow-up from recent visit 9/12/24. Patient recently had strep throat and was on clarithromycin. She had chest tightness and congestion with fevers. CXR was obtained and negative. She was started on pred burst, tessalon perle, albuterol and zpack. Pt follows up in office today and states she feels better. But she continues to have problem with throat. She fills very congested in her throat. This has been for some years. She constantly clears throat and has cough. Worse in am. Has hx of allergies. Does not take meds.         Past Medical History:    Disorder of thyroid    hypothyroid    Visual impairment    reading glasses      Past Surgical History:   Procedure Laterality Date    Colonoscopy      Egd      Total abdominal hysterectomy N/A 11/28/2018    Procedure: ABDOMINAL HYSTERECTOMY;  Surgeon: Yared Berry MD;  Location: [facility] MAIN OR        History/Other:    Chief Complaint Reviewed and Verified  Nursing Notes Reviewed and   Verified  Tobacco Reviewed  Allergies Reviewed  Medications Reviewed    Problem List Reviewed  Medical History Reviewed  Surgical History   Reviewed  OB Status Reviewed  Family History Reviewed  Social History   Reviewed         Tobacco:  She smoked tobacco in the past but quit greater than 12 months ago.  Social History     Tobacco Use   Smoking Status Former    Types: Cigarettes    Passive exposure: Never   Smokeless Tobacco Never   Tobacco Comments    Social smoking        Current Outpatient Medications   Medication Sig Dispense Refill    levocetirizine 5 MG  Oral Tab Take 1 tablet (5 mg total) by mouth every evening. 90 tablet 0    fluticasone propionate 50 MCG/ACT Nasal Suspension 2 sprays by Each Nare route daily for 360 doses. 9.9 mL 0    benzonatate (TESSALON PERLES) 100 MG Oral Cap Take 1 capsule (100 mg total) by mouth 3 (three) times daily as needed for cough. 30 capsule 0    predniSONE 20 MG Oral Tab Take 2 tablets once a day for 5 days 10 tablet 0    albuterol 108 (90 Base) MCG/ACT Inhalation Aero Soln Inhale 2 puffs into the lungs every 4 (four) hours as needed for Wheezing or Shortness of Breath. 18 g 5    Fenofibrate 134 MG Oral Cap Take 1 capsule by mouth daily. 30 capsule 0    rosuvastatin 40 MG Oral Tab Take 1 tablet (40 mg total) by mouth nightly. 90 tablet 1    omega-3-acid ethyl esters 1 g Oral Cap Take two capsules twice a day 360 capsule 3    levothyroxine 75 MCG Oral Tab Take 1 tablet (75 mcg total) by mouth before breakfast. 90 tablet 1    albuterol 108 (90 Base) MCG/ACT Inhalation Aero Soln Inhale 2 puffs into the lungs every 4 (four) hours as needed for Wheezing or Shortness of Breath. 18 g 5    Spacer/Aero-Holding Chambers Does not apply Device Use with hfa inhaler as directed (Patient not taking: Reported on 9/6/2024) 1 each 0         Review of Systems:  Review of Systems   Constitutional: Negative.  Negative for activity change, appetite change, chills and fever.   HENT:  Positive for congestion, postnasal drip, rhinorrhea and trouble swallowing. Negative for sore throat, tinnitus and voice change.    Eyes: Negative.    Respiratory: Negative.  Negative for apnea, cough, chest tightness and shortness of breath.    Cardiovascular:  Negative for chest pain and leg swelling.   Gastrointestinal: Negative.  Negative for abdominal pain, anal bleeding and blood in stool.   Genitourinary: Negative.  Negative for difficulty urinating, flank pain and menstrual problem.   Musculoskeletal: Negative.  Negative for joint swelling.   Skin: Negative.     Neurological: Negative.  Negative for dizziness and headaches.   Psychiatric/Behavioral: Negative.  Negative for agitation.          Objective:   /82 (BP Location: Left arm, Patient Position: Sitting, Cuff Size: large)   Pulse 60   Temp 98.1 °F (36.7 °C)   Ht 5' 5\" (1.651 m)   Wt 131 lb 9.6 oz (59.7 kg)   LMP 11/20/2010   SpO2 97%   BMI 21.90 kg/m²  Estimated body mass index is 21.9 kg/m² as calculated from the following:    Height as of this encounter: 5' 5\" (1.651 m).    Weight as of this encounter: 131 lb 9.6 oz (59.7 kg).  Physical Exam  Constitutional:       Appearance: Normal appearance. She is normal weight.   HENT:      Right Ear: Tympanic membrane, ear canal and external ear normal. There is no impacted cerumen.      Left Ear: Tympanic membrane, ear canal and external ear normal. There is no impacted cerumen.      Nose: Congestion and rhinorrhea present.      Comments: Pale boggy turbinates     Mouth/Throat:      Mouth: Mucous membranes are moist.      Pharynx: Oropharynx is clear.      Comments: +pnd  Cardiovascular:      Pulses: Normal pulses.      Heart sounds: Normal heart sounds.   Pulmonary:      Effort: Pulmonary effort is normal. No respiratory distress.      Breath sounds: Normal breath sounds.   Skin:     General: Skin is warm and dry.      Capillary Refill: Capillary refill takes less than 2 seconds.   Neurological:      Mental Status: She is alert and oriented to person, place, and time.           Assessment & Plan:   1. Sore throat (Primary)  2. Strep pharyngitis  3. Cough, unspecified type  4. Bronchitis  5. Seasonal allergic rhinitis due to pollen  -     Levocetirizine Dihydrochloride; Take 1 tablet (5 mg total) by mouth every evening.  Dispense: 90 tablet; Refill: 0  -     Fluticasone Propionate; 2 sprays by Each Nare route daily for 360 doses.  Dispense: 9.9 mL; Refill: 0    Acute infection resolved    Patient with exam consistent for allergic rhinitis  Pale boggy turbinates,  postnasal drip with cobblestoning, and congestion in back of throat.  Patient educated on allergic rhinitis.  Educated on the use of normal saline rinses to flush out allergens.  Start antihistamine once daily.  Start Flonase once daily bilateral nares.  If symptoms persist will consider steroid burst, addition of Singulair, or referral to allergy specialist.  Red flags reviewed  Follow-up as needed    Patient aware of plan of care. All questions answered to satisfaction of the patient. Patient instructed to call office or reach out via [employer] if any issues arise. For urgent issues and/or reviewed red flags please proceed to the urgent care or ER.  Also, inform the nurse practitioner with any new symptoms or medication side effects.        Return if symptoms worsen or fail to improve.    Peyman Kilpatrcik, REIGNE, 9/16/2024, 4:55 PM

## 2024-09-19 ENCOUNTER — OFFICE VISIT (OUTPATIENT)
Dept: FAMILY MEDICINE CLINIC | Facility: CLINIC | Age: 64
End: 2024-09-19

## 2024-09-19 VITALS
BODY MASS INDEX: 21.93 KG/M2 | SYSTOLIC BLOOD PRESSURE: 133 MMHG | HEART RATE: 60 BPM | OXYGEN SATURATION: 97 % | HEIGHT: 65 IN | WEIGHT: 131.63 LBS | DIASTOLIC BLOOD PRESSURE: 82 MMHG | TEMPERATURE: 98 F

## 2024-09-19 DIAGNOSIS — J02.9 SORE THROAT: Primary | ICD-10-CM

## 2024-09-19 DIAGNOSIS — J40 BRONCHITIS: ICD-10-CM

## 2024-09-19 DIAGNOSIS — J30.1 SEASONAL ALLERGIC RHINITIS DUE TO POLLEN: ICD-10-CM

## 2024-09-19 DIAGNOSIS — J02.0 STREP PHARYNGITIS: ICD-10-CM

## 2024-09-19 DIAGNOSIS — R05.9 COUGH, UNSPECIFIED TYPE: ICD-10-CM

## 2024-09-19 PROCEDURE — 3075F SYST BP GE 130 - 139MM HG: CPT

## 2024-09-19 PROCEDURE — 3079F DIAST BP 80-89 MM HG: CPT

## 2024-09-19 PROCEDURE — 99213 OFFICE O/P EST LOW 20 MIN: CPT

## 2024-09-19 PROCEDURE — 3008F BODY MASS INDEX DOCD: CPT

## 2024-09-19 RX ORDER — FLUTICASONE PROPIONATE 50 MCG
2 SPRAY, SUSPENSION (ML) NASAL DAILY
Qty: 9.9 ML | Refills: 0 | Status: SHIPPED | OUTPATIENT
Start: 2024-09-19 | End: 2025-09-14

## 2024-09-19 RX ORDER — LEVOCETIRIZINE DIHYDROCHLORIDE 5 MG/1
5 TABLET, FILM COATED ORAL EVERY EVENING
Qty: 90 TABLET | Refills: 0 | Status: SHIPPED | OUTPATIENT
Start: 2024-09-19 | End: 2024-12-18

## 2024-09-27 ENCOUNTER — OFFICE VISIT (OUTPATIENT)
Dept: FAMILY MEDICINE CLINIC | Facility: CLINIC | Age: 64
End: 2024-09-27

## 2024-09-27 VITALS
HEART RATE: 63 BPM | SYSTOLIC BLOOD PRESSURE: 140 MMHG | WEIGHT: 134 LBS | DIASTOLIC BLOOD PRESSURE: 75 MMHG | HEIGHT: 65 IN | BODY MASS INDEX: 22.33 KG/M2

## 2024-09-27 DIAGNOSIS — K13.79 LESION OF PALATE: Primary | ICD-10-CM

## 2024-09-27 DIAGNOSIS — K29.60 REFLUX GASTRITIS: ICD-10-CM

## 2024-09-27 PROCEDURE — 3008F BODY MASS INDEX DOCD: CPT | Performed by: NURSE PRACTITIONER

## 2024-09-27 PROCEDURE — 3078F DIAST BP <80 MM HG: CPT | Performed by: NURSE PRACTITIONER

## 2024-09-27 PROCEDURE — 3077F SYST BP >= 140 MM HG: CPT | Performed by: NURSE PRACTITIONER

## 2024-09-27 PROCEDURE — 99214 OFFICE O/P EST MOD 30 MIN: CPT | Performed by: NURSE PRACTITIONER

## 2024-09-27 NOTE — ASSESSMENT & PLAN NOTE
Magic mouth wash qid for 7-10 days  Omeprazole 20 mg daily  To ENT if symptoms not improved within 2 weeks.

## 2024-09-27 NOTE — PROGRESS NOTES
HPI  Pt here for sore on top of mouth for the past week  Had sore throat and completed antibiotics for + strep    Feels like she has some reflux and some difficulty swallowing.       Review of Systems   Constitutional:  Negative for activity change, appetite change and fever.   HENT:  Positive for congestion, mouth sores and trouble swallowing. Negative for sore throat.    Respiratory:  Positive for cough. Negative for chest tightness and shortness of breath.    Cardiovascular:  Negative for chest pain.   Gastrointestinal:  Negative for abdominal pain.        Has mild reflux       Vitals:    09/27/24 0952 09/27/24 1016   BP: 143/84 140/75   Pulse: 63    Weight: 134 lb (60.8 kg)    Height: 5' 5\" (1.651 m)      Body mass index is 22.3 kg/m².  Wt Readings from Last 6 Encounters:   09/27/24 134 lb (60.8 kg)   09/19/24 131 lb 9.6 oz (59.7 kg)   09/12/24 133 lb 12.8 oz (60.7 kg)   09/06/24 133 lb 4 oz (60.4 kg)   02/23/24 136 lb (61.7 kg)   01/26/24 134 lb (60.8 kg)        Health Maintenance   Topic Date Due    DTaP,Tdap,and Td Vaccines (1 - Tdap) Never done    Mammogram  01/27/2023    COVID-19 Vaccine (3 - 2023-24 season) 09/01/2024    Influenza Vaccine (1) 10/01/2024    Annual Physical  01/26/2025    Colorectal Cancer Screening  01/18/2032    Annual Depression Screening  Completed    Zoster Vaccines  Completed    Pneumococcal Vaccine: Birth to 64yrs  Aged Out       Patient's last menstrual period was 11/20/2010.    Past Medical History:    Disorder of thyroid    hypothyroid    Visual impairment    reading glasses       .  Past Surgical History:   Procedure Laterality Date    Colonoscopy      Egd      Total abdominal hysterectomy N/A 11/28/2018    Procedure: ABDOMINAL HYSTERECTOMY;  Surgeon: Yared Berry MD;  Location: [facility] [place] MAIN OR       Family History   Problem Relation Age of Onset    Cancer Father         thyroid cancer    Diabetes Mother     Breast Cancer Mother 60    Breast Cancer Sister 50       Social History      Socioeconomic History    Marital status: Single     Spouse name: Not on file    Number of children: Not on file    Years of education: Not on file    Highest education level: Not on file   Occupational History    Not on file   Tobacco Use    Smoking status: Former     Types: Cigarettes     Passive exposure: Never    Smokeless tobacco: Never    Tobacco comments:     Social smoking   Vaping Use    Vaping status: Never Used   Substance and Sexual Activity    Alcohol use: No    Drug use: No    Sexual activity: Not on file   Other Topics Concern    Left Handed Not Asked    Right Handed Yes    Currently spends a great deal of time in the sun Not Asked    Past Sunlamp Treatments for Acne Not Asked    History of tanning Not Asked    Hx of Spending Great Deal of Time in Sun Not Asked    Bad sunburns in the past Not Asked    Tanning Salons in the Past Not Asked    Hx of Radiation Treatments Not Asked    Regular use of sun block Not Asked   Social History Narrative    Not on file     Social Determinants of Health     Financial Resource Strain: Not on file   Food Insecurity: Not on file   Transportation Needs: Not on file   Physical Activity: Not on file   Stress: Not on file   Social Connections: Not on file   Housing Stability: Not on file       Current Outpatient Medications   Medication Sig Dispense Refill    benadryl-lidocaine-mylanta 1:1:1 Oral Suspension Take 5-10 mL by mouth TID AC&HS. Swish and Smallow or Swish and Spit 118 mL 0    omeprazole 20 MG Oral Capsule Delayed Release Take 1 capsule (20 mg total) by mouth every morning before breakfast. 30 capsule 0    levocetirizine 5 MG Oral Tab Take 1 tablet (5 mg total) by mouth every evening. 90 tablet 0    fluticasone propionate 50 MCG/ACT Nasal Suspension 2 sprays by Each Nare route daily for 360 doses. 9.9 mL 0    benzonatate (TESSALON PERLES) 100 MG Oral Cap Take 1 capsule (100 mg total) by mouth 3 (three) times daily as needed for cough. 30 capsule 0    albuterol  108 (90 Base) MCG/ACT Inhalation Aero Soln Inhale 2 puffs into the lungs every 4 (four) hours as needed for Wheezing or Shortness of Breath. 18 g 5    Fenofibrate 134 MG Oral Cap Take 1 capsule by mouth daily. 30 capsule 0    rosuvastatin 40 MG Oral Tab Take 1 tablet (40 mg total) by mouth nightly. 90 tablet 1    omega-3-acid ethyl esters 1 g Oral Cap Take two capsules twice a day 360 capsule 3    levothyroxine 75 MCG Oral Tab Take 1 tablet (75 mcg total) by mouth before breakfast. 90 tablet 1    albuterol 108 (90 Base) MCG/ACT Inhalation Aero Soln Inhale 2 puffs into the lungs every 4 (four) hours as needed for Wheezing or Shortness of Breath. 18 g 5    predniSONE 20 MG Oral Tab Take 2 tablets once a day for 5 days (Patient not taking: Reported on 9/27/2024) 10 tablet 0    Spacer/Aero-Holding Chambers Does not apply Device Use with hfa inhaler as directed (Patient not taking: Reported on 9/6/2024) 1 each 0       Allergies:  Allergies   Allergen Reactions    Milk-Related Compounds DIARRHEA    Penicillins DIARRHEA     Confirmed with patient never had true allergy only stomach upset and diarrhea        Physical Exam  Vitals and nursing note reviewed.   Constitutional:       General: She is not in acute distress.     Appearance: Normal appearance.   HENT:      Head: Normocephalic.      Nose: Congestion and rhinorrhea present.      Mouth/Throat:      Mouth: Mucous membranes are moist.      Dentition: No gum lesions.      Tongue: No lesions.      Palate: Lesions present.      Pharynx: Oropharynx is clear. Uvula midline. No oropharyngeal exudate or posterior oropharyngeal erythema.      Tonsils: No tonsillar exudate.     Cardiovascular:      Rate and Rhythm: Normal rate and regular rhythm.      Heart sounds: Normal heart sounds.   Pulmonary:      Effort: Pulmonary effort is normal. No respiratory distress.      Breath sounds: Normal breath sounds.   Skin:     General: Skin is warm and dry.   Neurological:      Mental  Status: She is alert.         Assessment and Plan:  Problem List Items Addressed This Visit       Lesion of palate - Primary     Magic mouth wash qid for 7-10 days  Omeprazole 20 mg daily  To ENT if symptoms not improved within 2 weeks.            Relevant Medications    benadryl-lidocaine-mylanta 1:1:1 Oral Suspension    Other Relevant Orders    ENT - INTERNAL    Reflux gastritis    Relevant Medications    benadryl-lidocaine-mylanta 1:1:1 Oral Suspension    omeprazole 20 MG Oral Capsule Delayed Release                   Discussed plan of care with pt and pt is in agreement.All questions answered. Pt to call with questions or concerns.    Encouraged to sign up for My Chart if not already registered.     Note to patient and family:  The 21st Century Cures Act makes medical notes available to patients in the interest of transparency.  However, please be advised that this is a medical document.  It is intended as a peer to peer communication.  It is written in medical language and may contain abbreviations or verbiage that are technical and unfamiliar.  It may appear blunt or direct.  Medical documents are intended to carry relevant information, facts as evident, and the clinical opinion of the practitioner.

## 2024-10-03 ENCOUNTER — OFFICE VISIT (OUTPATIENT)
Dept: OTOLARYNGOLOGY | Facility: CLINIC | Age: 64
End: 2024-10-03

## 2024-10-03 ENCOUNTER — TELEPHONE (OUTPATIENT)
Dept: OTOLARYNGOLOGY | Facility: CLINIC | Age: 64
End: 2024-10-03

## 2024-10-03 VITALS — WEIGHT: 136.13 LBS | BODY MASS INDEX: 23 KG/M2

## 2024-10-03 DIAGNOSIS — J04.0 LARYNGITIS: ICD-10-CM

## 2024-10-03 DIAGNOSIS — R05.3 CHRONIC COUGH: Primary | ICD-10-CM

## 2024-10-03 DIAGNOSIS — K13.79 LESION OF PALATE: Primary | ICD-10-CM

## 2024-10-03 DIAGNOSIS — K21.00 GASTROESOPHAGEAL REFLUX DISEASE WITH ESOPHAGITIS, UNSPECIFIED WHETHER HEMORRHAGE: ICD-10-CM

## 2024-10-03 DIAGNOSIS — J02.9 SORE THROAT: ICD-10-CM

## 2024-10-03 PROCEDURE — 99204 OFFICE O/P NEW MOD 45 MIN: CPT | Performed by: STUDENT IN AN ORGANIZED HEALTH CARE EDUCATION/TRAINING PROGRAM

## 2024-10-03 PROCEDURE — 31575 DIAGNOSTIC LARYNGOSCOPY: CPT | Performed by: STUDENT IN AN ORGANIZED HEALTH CARE EDUCATION/TRAINING PROGRAM

## 2024-10-03 RX ORDER — OMEPRAZOLE 40 MG/1
40 CAPSULE, DELAYED RELEASE ORAL
Qty: 60 CAPSULE | Refills: 0 | Status: SHIPPED | OUTPATIENT
Start: 2024-10-03 | End: 2024-11-02

## 2024-10-03 RX ORDER — BETAMETHASONE DIPROPIONATE 0.5 MG/G
OINTMENT, AUGMENTED TOPICAL
Qty: 15 G | Refills: 0 | Status: SHIPPED | OUTPATIENT
Start: 2024-10-03

## 2024-10-03 NOTE — TELEPHONE ENCOUNTER
Per patient calling for cough medicine, states the pharmacy did not received the prescription. Please advise

## 2024-10-03 NOTE — PROGRESS NOTES
Merary Mckeon is a 64 year old female.   Chief Complaint   Patient presents with    Lesion     Patient is here for lesion on palate reports cough,itchy reports voice chances patient reports she was sick couple weeks ago.     HPI:   64-year-old presents regarding a lesion on her palate that is irritating to her occasionally as well as tightness in her throat.  Had an upper respiratory infection treated with oral clarithromycin and then oral azithromycin in the beginning of September.  Most of her symptoms have resolved although still feels congestion in her throat.  Denies any trauma to her palate.  History of smoking    Current Outpatient Medications   Medication Sig Dispense Refill    Betamethasone Dipropionate Aug 0.05 % External Ointment Apply by topical route twice a day to the affected area(s); do not exceed 45 grams per week. 15 g 0    Omeprazole 40 MG Oral Capsule Delayed Release Take 1 capsule (40 mg total) by mouth 2 (two) times daily before meals. Before a meal 60 capsule 0    benadryl-lidocaine-mylanta 1:1:1 Oral Suspension Take 5-10 mL by mouth TID AC&HS. Swish and Smallow or Swish and Spit 118 mL 0    levocetirizine 5 MG Oral Tab Take 1 tablet (5 mg total) by mouth every evening. 90 tablet 0    fluticasone propionate 50 MCG/ACT Nasal Suspension 2 sprays by Each Nare route daily for 360 doses. 9.9 mL 0    benzonatate (TESSALON PERLES) 100 MG Oral Cap Take 1 capsule (100 mg total) by mouth 3 (three) times daily as needed for cough. 30 capsule 0    albuterol 108 (90 Base) MCG/ACT Inhalation Aero Soln Inhale 2 puffs into the lungs every 4 (four) hours as needed for Wheezing or Shortness of Breath. 18 g 5    Fenofibrate 134 MG Oral Cap Take 1 capsule by mouth daily. 30 capsule 0    rosuvastatin 40 MG Oral Tab Take 1 tablet (40 mg total) by mouth nightly. 90 tablet 1    omega-3-acid ethyl esters 1 g Oral Cap Take two capsules twice a day 360 capsule 3    levothyroxine 75 MCG Oral Tab Take 1 tablet  (75 mcg total) by mouth before breakfast. 90 tablet 1    albuterol 108 (90 Base) MCG/ACT Inhalation Aero Soln Inhale 2 puffs into the lungs every 4 (four) hours as needed for Wheezing or Shortness of Breath. 18 g 5    predniSONE 20 MG Oral Tab Take 2 tablets once a day for 5 days (Patient not taking: Reported on 9/27/2024) 10 tablet 0    Spacer/Aero-Holding Chambers Does not apply Device Use with hfa inhaler as directed (Patient not taking: Reported on 9/6/2024) 1 each 0      Past Medical History:    Disorder of thyroid    hypothyroid    Visual impairment    reading glasses      Social History:  Social History     Socioeconomic History    Marital status: Single   Tobacco Use    Smoking status: Former     Types: Cigarettes     Passive exposure: Never    Smokeless tobacco: Never    Tobacco comments:     Social smoking   Vaping Use    Vaping status: Never Used   Substance and Sexual Activity    Alcohol use: No    Drug use: No   Other Topics Concern    Right Handed Yes      Past Surgical History:   Procedure Laterality Date    Colonoscopy      Egd      Total abdominal hysterectomy N/A 11/28/2018    Procedure: ABDOMINAL HYSTERECTOMY;  Surgeon: Yared Berry MD;  Location: [facility] MAIN OR         EXAM:   Wt 136 lb 1.6 oz (61.7 kg)   LMP 11/20/2010   BMI 22.65 kg/m²     System Details   Skin Inspection - Normal.   Constitutional Overall appearance - Normal.   Head/Face Symmetric, TMJ tenderness not present    Eyes EOMI, PERRL   Right ear:  Canal clear, TM intact, no HENRI   Left ear:  Canal clear, TM intact, no HENRI   Nose: Septum midline, inferior turbinates not enlarged, nasal valves without collapse    Oral cavity/Oropharynx: In the mid hard palate there is a somewhat firm raised lesion about 1 x 1 cm in size with overlying erythema and a small area of superficial ulceration, tonsils symmetric    Neck: Soft without LAD, thyroid not enlarged  Voice clear/ no stridor   Other:      SCOPES AND PROCEDURES:       Flexible  Laryngoscopy Procedure Note (62586)    Due to inability for adequate examination of the larynx and need for magnification to perform the examination, endoscopy was performed.  Risks and benefits were discussed with patient/family and they have given verbal consent to proceed.    Pre-operative Diagnosis:   1. Lesion of palate    2. Laryngitis    3. Sore throat    4. Gastroesophageal reflux disease with esophagitis, unspecified whether hemorrhage        Post-operative Diagnosis: Same    Procedure: Diagnostic flexible fiberoptic laryngoscopy    Anesthesia: Topical anesthetic [place]     Surgeon Talha Chiu MD    EBL: 0cc    Procedure Detail & Findings:     After placement of topical anesthetic intranasally the flexible laryngoscope was inserted into the nare and driven through the nasal cavity with no significant abnormal findings noted in the nasal cavities or nasopharynx. The oropharynx, hypopharynx and larynx were subsequently examined and the following findings were noted:        Base of Tongue: Normal        Valeculla: Normal        Arytenoids: Normal/Erythemous: Erythmous        Introitus of the esophagus: Normal        Epiglottis: Normal        False vocal cords: Normal        True vocal cords: Normal        Subglottic space: Normal        Mobility of True vocal cords: Normal    Condition: Stable    Complications: Patient tolerated the procedure well with no immediate complication.    Talha Chiu MD    AUDIOGRAM AND IMAGING:         IMPRESSION:   1. Lesion of palate    2. Laryngitis    3. Sore throat    4. Gastroesophageal reflux disease with esophagitis, unspecified whether hemorrhage       Recommendations:  -Very erythematous arytenoids on laryngoscopy possibly indicative of a reflux process or a chronic laryngitis.  Will increase her omeprazole to 40 mg twice a day. No signs of sinusitis.   -Will trial a steroid ointment for her palate lesion and bring her back in 2 weeks if still not improving will perform  a biopsy of the lesion.  She does have a history of smoking    The patient indicates understanding of these issues and agrees to the plan.  Consult from Dr Ma regarding throat and mouth evaluation    Talha Chiu MD  10/3/2024  2:21 PM

## 2024-10-04 ENCOUNTER — APPOINTMENT (OUTPATIENT)
Dept: GENERAL RADIOLOGY | Age: 64
End: 2024-10-04
Attending: EMERGENCY MEDICINE

## 2024-10-04 ENCOUNTER — HOSPITAL ENCOUNTER (EMERGENCY)
Age: 64
Discharge: HOME | End: 2024-10-04
Attending: EMERGENCY MEDICINE

## 2024-10-04 ENCOUNTER — APPOINTMENT (OUTPATIENT)
Dept: CT IMAGING | Age: 64
End: 2024-10-04
Attending: EMERGENCY MEDICINE

## 2024-10-04 VITALS
HEART RATE: 63 BPM | RESPIRATION RATE: 18 BRPM | OXYGEN SATURATION: 98 % | DIASTOLIC BLOOD PRESSURE: 71 MMHG | SYSTOLIC BLOOD PRESSURE: 132 MMHG | HEIGHT: 66 IN | WEIGHT: 141.09 LBS | BODY MASS INDEX: 22.68 KG/M2

## 2024-10-04 DIAGNOSIS — R51.9 ACUTE NONINTRACTABLE HEADACHE, UNSPECIFIED HEADACHE TYPE: Primary | ICD-10-CM

## 2024-10-04 DIAGNOSIS — F41.0 PANIC ATTACK: ICD-10-CM
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
## 2024-10-04 PROCEDURE — 71046 X-RAY EXAM CHEST 2 VIEWS: CPT

## 2024-10-04 PROCEDURE — 96374 THER/PROPH/DIAG INJ IV PUSH: CPT

## 2024-10-04 PROCEDURE — 72125 CT NECK SPINE W/O DYE: CPT

## 2024-10-04 PROCEDURE — 70450 CT HEAD/BRAIN W/O DYE: CPT

## 2024-10-04 PROCEDURE — 99284 EMERGENCY DEPT VISIT MOD MDM: CPT

## 2024-10-04 PROCEDURE — 72170 X-RAY EXAM OF PELVIS: CPT

## 2024-10-04 PROCEDURE — 10002803 HB RX 637: Performed by: EMERGENCY MEDICINE

## 2024-10-04 PROCEDURE — 10002800 HB RX 250 W HCPCS: Performed by: EMERGENCY MEDICINE

## 2024-10-04 RX ORDER — CYCLOBENZAPRINE HCL 10 MG
10 TABLET ORAL 3 TIMES DAILY PRN
Qty: 15 TABLET | Refills: 0 | Status: SHIPPED | OUTPATIENT
Start: 2024-10-04

## 2024-10-04 RX ORDER — HYDROCODONE BITARTRATE AND ACETAMINOPHEN 5; 325 MG/1; MG/1
1 TABLET ORAL ONCE
Status: COMPLETED | OUTPATIENT
Start: 2024-10-04 | End: 2024-10-04

## 2024-10-04 RX ORDER — CODEINE PHOSPHATE AND GUAIFENESIN 10; 100 MG/5ML; MG/5ML
10 SOLUTION ORAL EVERY 6 HOURS PRN
Qty: 200 ML | Refills: 0 | Status: SHIPPED | OUTPATIENT
Start: 2024-10-04 | End: 2024-10-09

## 2024-10-04 RX ORDER — LORAZEPAM 2 MG/ML
0.5 INJECTION INTRAMUSCULAR ONCE
Status: COMPLETED | OUTPATIENT
Start: 2024-10-04 | End: 2024-10-04

## 2024-10-04 RX ORDER — BENZONATATE 200 MG/1
200 CAPSULE ORAL 3 TIMES DAILY PRN
Qty: 21 CAPSULE | Refills: 0 | Status: SHIPPED | OUTPATIENT
Start: 2024-10-04 | End: 2024-10-11

## 2024-10-04 RX ADMIN — LORAZEPAM 0.5 MG: 2 INJECTION INTRAMUSCULAR; INTRAVENOUS at 08:50

## 2024-10-04 RX ADMIN — HYDROCODONE BITARTRATE AND ACETAMINOPHEN 1 TABLET: 5; 325 TABLET ORAL at 09:37

## 2024-10-06 ASSESSMENT — ENCOUNTER SYMPTOMS: HEADACHES: 1

## 2024-10-17 ENCOUNTER — HOSPITAL ENCOUNTER (OUTPATIENT)
Age: 64
Discharge: HOME | End: 2024-10-17
Payer: COMMERCIAL

## 2024-10-17 ENCOUNTER — OFFICE VISIT (OUTPATIENT)
Dept: OTOLARYNGOLOGY | Facility: CLINIC | Age: 64
End: 2024-10-17

## 2024-10-17 VITALS
TEMPERATURE: 98 F | DIASTOLIC BLOOD PRESSURE: 82 MMHG | RESPIRATION RATE: 16 BRPM | OXYGEN SATURATION: 98 % | HEART RATE: 93 BPM | SYSTOLIC BLOOD PRESSURE: 141 MMHG

## 2024-10-17 DIAGNOSIS — K13.79 BLEEDING FROM MOUTH: Primary | ICD-10-CM

## 2024-10-17 DIAGNOSIS — K13.79 LESION OF PALATE: ICD-10-CM

## 2024-10-17 DIAGNOSIS — K21.00 GASTROESOPHAGEAL REFLUX DISEASE WITH ESOPHAGITIS, UNSPECIFIED WHETHER HEMORRHAGE: Primary | ICD-10-CM

## 2024-10-17 PROCEDURE — 99213 OFFICE O/P EST LOW 20 MIN: CPT | Performed by: STUDENT IN AN ORGANIZED HEALTH CARE EDUCATION/TRAINING PROGRAM

## 2024-10-17 PROCEDURE — 99213 OFFICE O/P EST LOW 20 MIN: CPT

## 2024-10-17 PROCEDURE — 42100 BIOPSY ROOF OF MOUTH: CPT | Performed by: STUDENT IN AN ORGANIZED HEALTH CARE EDUCATION/TRAINING PROGRAM

## 2024-10-17 RX ORDER — FAMOTIDINE 40 MG/1
40 TABLET, FILM COATED ORAL NIGHTLY
Qty: 30 TABLET | Refills: 0 | Status: SHIPPED | OUTPATIENT
Start: 2024-10-17

## 2024-10-17 RX ORDER — TRANEXAMIC ACID 100 MG/ML
5 INJECTION, SOLUTION INTRAVENOUS ONCE
Status: COMPLETED | OUTPATIENT
Start: 2024-10-17 | End: 2024-10-17

## 2024-10-17 RX ORDER — LIDOCAINE HYDROCHLORIDE AND EPINEPHRINE 10; 10 MG/ML; UG/ML
1 INJECTION, SOLUTION INFILTRATION; PERINEURAL ONCE
Status: COMPLETED | OUTPATIENT
Start: 2024-10-17 | End: 2024-10-17

## 2024-10-17 NOTE — ED PROVIDER NOTES
Patient Seen in: [facility]      History     Chief Complaint   Patient presents with    Mouth/Lip Problem     Stated Complaint: consistnent bleeding from mouthafter biopsy today  Subjective:   Merary is a 64-year-old female presenting to the immediate care complaining of bleeding.  Patient states that she had a procedure with the ENT provider this afternoon.  States that shortly after leaving the office she started to bleed and has been bleeding ever since.  Patient does not take any blood thinners.  Patient has been spitting the blood mostly but has also been swallowing some blood.  Denies any sore throat.  Denies any fever.        Objective:   Past Medical History:    Disorder of thyroid    hypothyroid    Visual impairment    reading glasses            Past Surgical History:   Procedure Laterality Date    Colonoscopy      Egd      Total abdominal hysterectomy N/A 11/28/2018    Procedure: ABDOMINAL HYSTERECTOMY;  Surgeon: Yared Berry MD;  Location: [facility] MAIN OR              Social History     Socioeconomic History    Marital status: Single   Tobacco Use    Smoking status: Former     Types: Cigarettes     Passive exposure: Never    Smokeless tobacco: Never    Tobacco comments:     Social smoking   Vaping Use    Vaping status: Never Used   Substance and Sexual Activity    Alcohol use: No    Drug use: No   Other Topics Concern    Right Handed Yes            Review of Systems    Positive for stated complaint: Mouth/Lip Problem    Other systems are as noted in HPI.  Constitutional and vital signs reviewed.      All other systems reviewed and negative except as noted above.    Physical Exam     ED Triage Vitals [10/17/24 1736]   /82   Pulse 93   Resp 16   Temp 97.7 °F (36.5 °C)   Temp src Temporal   SpO2 98 %   O2 Device None (Room air)     Current:/82   Pulse 93   Temp 97.7 °F (36.5 °C) (Temporal)   Resp 16   LMP 11/20/2010   SpO2 98%     Physical Exam  Vitals and nursing note  reviewed.   Constitutional:       General: She is not in acute distress.     Appearance: Normal appearance. She is not ill-appearing, toxic-appearing or diaphoretic.   HENT:      Head: Normocephalic.      Jaw: There is normal jaw occlusion.      Nose: Nose normal.      Mouth/Throat:      Lips: Pink.      Mouth: Mucous membranes are moist. No angioedema.      Tongue: No lesions.      Palate: Lesions present.      Pharynx: Oropharynx is clear. Uvula midline. No pharyngeal swelling, oropharyngeal exudate, posterior oropharyngeal erythema, uvula swelling or postnasal drip.      Tonsils: No tonsillar exudate or tonsillar abscesses. 0 on the right. 0 on the left.      Comments: Patient has an approximate 1 cm incision to the hard palate following a biopsy earlier today.  There is blood flowing constantly from this area.  Patient has been changing gauze in her mouth about every minute from saturated blood.    Eyes:      Conjunctiva/sclera: Conjunctivae normal.   Cardiovascular:      Rate and Rhythm: Normal rate and regular rhythm.      Pulses: Normal pulses.      Heart sounds: Normal heart sounds.   Pulmonary:      Effort: Pulmonary effort is normal. No respiratory distress.      Breath sounds: Normal breath sounds. No stridor. No wheezing, rhonchi or rales.   Abdominal:      General: Abdomen is flat.   Musculoskeletal:         General: Normal range of motion.      Cervical back: Normal range of motion.   Skin:     General: Skin is warm.      Capillary Refill: Capillary refill takes less than 2 seconds.   Neurological:      General: No focal deficit present.      Mental Status: She is alert and oriented to person, place, and time.   Psychiatric:         Mood and Affect: Mood normal.         Behavior: Behavior normal.         Thought Content: Thought content normal.         Judgment: Judgment normal.             ED Course   Radiology:  No results found.  Labs Reviewed - No data to display  1750 -500 mg of TXA saturated onto  a gauze with direct pressure held by this provider for several minutes.  Patient had continued bleeding despite this treatment.  1800 a second dose of  mg saturated on a gauze with direct pressure held for 5+ minutes by this provider.  Patient has continued bleeding.  1810 -I contacted Dr. Jaeger from ENT who is on-call for Dr. Chiu who performed the procedure earlier today.  Dr. Jaeger suggested injecting with lidocaine with epinephrine and cauterizing with silver nitrate.  1818 -I injected the wound with approximately 1 cc of lidocaine with epi.  Used silver nitrate to cauterize the bleeding.  Will monitor.  1835 - No bleeding.  Patient given water to see if drinking/swallowing causes wound to bleed  1902 -patient was able to drink water.  There is no continued bleeding from the wound.  No other areas of bleeding noted.  Patient able to swallow without difficulty.  MDM     Medical Decision Making  Differential diagnoses reflecting the complexity of care include but are not limited to post-procedure bleeding, wound infection.    Comorbidities that add complexity to management include: None  History obtained by an independent source was from: Patient  Discussions of management was done with: Dr. Jaeger, ENT  Patient is well appearing, non-toxic and in no acute distress.  Vital signs are stable.     Patient's history and physical exam are consistent with postprocedure bleeding.  On arrival patient was constantly losing a steady stream of blood from the wound on the top of her mouth.  Attempted initially direct pressure.  Attempted TXA x 2 without success.  Spoke with Dr. Jaeger from the ENT who suggested injecting with lidocaine with epinephrine and using silver nitrate to cauterize.   Patient was monitored for 45 minutes after injecting with lidocaine and cauterizing and she did not have any further bleeding.  She did drink water prior to leaving.  See timeline/interventions above.  I recommended to the patient  that she only eat soft foods.  Recommended plenty of fluids.  I did discuss with her that if this scab gets disrupted again she will bleed again.  Recommended that if she does start to bleed again that she go to the emergency department.  Recommended that she make an appointment to follow-up with her ENT next week.    ED precautions discussed.  Patient (guardian) advised to follow up with PCP in 2-3 days.  Patient (guardian) agrees with this plan of care.  Patient (guardian) verbalizes understanding of discharge instructions and plan of care.          Disposition and Plan     Clinical Impression:  1. Bleeding from mouth         Disposition:  Discharge  10/17/2024  7:04 pm    Follow-up:  Talha Chiu MD  [address] [place] [address]  [phone]                Medications Prescribed:  Discharge Medication List as of 10/17/2024  7:06 PM
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
## 2024-10-17 NOTE — ED INITIAL ASSESSMENT (HPI)
Patient reports she had a biopsy to the roof other mouth today and states the area continues to bleed.

## 2024-10-18 NOTE — DISCHARGE INSTRUCTIONS
Please drink plenty of fluids.  Please only eat soft foods.  Nothing crunchy, hard or chewing.  If the scab on the roof of your mouth gets disrupted again you will bleed again.  If you do start to bleed again you need to go to the emergency department.  Please call Dr. Chiu to follow-up in the office next week.

## 2025-01-09 ENCOUNTER — HOSPITAL ENCOUNTER (OUTPATIENT)
Age: 65
Discharge: HOME | End: 2025-01-09
Payer: COMMERCIAL

## 2025-01-09 VITALS
HEART RATE: 79 BPM | RESPIRATION RATE: 18 BRPM | OXYGEN SATURATION: 98 % | SYSTOLIC BLOOD PRESSURE: 143 MMHG | DIASTOLIC BLOOD PRESSURE: 72 MMHG | TEMPERATURE: 99 F

## 2025-01-09 DIAGNOSIS — J01.00 ACUTE NON-RECURRENT MAXILLARY SINUSITIS: Primary | ICD-10-CM

## 2025-01-09 DIAGNOSIS — J02.9 VIRAL PHARYNGITIS: ICD-10-CM

## 2025-01-09 DIAGNOSIS — Z20.822 ENCOUNTER FOR LABORATORY TESTING FOR COVID-19 VIRUS: ICD-10-CM

## 2025-01-09 DIAGNOSIS — Z87.09 HISTORY OF ALLERGIC RHINITIS: ICD-10-CM

## 2025-01-09 PROCEDURE — U0002 COVID-19 LAB TEST NON-CDC: HCPCS | Performed by: PHYSICIAN ASSISTANT

## 2025-01-09 PROCEDURE — 87880 STREP A ASSAY W/OPTIC: CPT | Performed by: PHYSICIAN ASSISTANT

## 2025-01-09 PROCEDURE — 87502 INFLUENZA DNA AMP PROBE: CPT | Performed by: PHYSICIAN ASSISTANT

## 2025-01-09 PROCEDURE — 99214 OFFICE O/P EST MOD 30 MIN: CPT | Performed by: PHYSICIAN ASSISTANT

## 2025-01-09 RX ORDER — METHYLPREDNISOLONE 4 MG/1
TABLET ORAL
Qty: 1 EACH | Refills: 0 | Status: SHIPPED | OUTPATIENT
Start: 2025-01-09

## 2025-01-09 RX ORDER — DOXYCYCLINE 100 MG/1
100 CAPSULE ORAL 2 TIMES DAILY
Qty: 20 CAPSULE | Refills: 0 | Status: SHIPPED | OUTPATIENT
Start: 2025-01-09 | End: 2025-01-19

## 2025-01-09 RX ORDER — OXYMETAZOLINE HYDROCHLORIDE 0.05 G/100ML
1 SPRAY NASAL EVERY 4 HOURS PRN
Qty: 15 ML | Refills: 0 | Status: SHIPPED | OUTPATIENT
Start: 2025-01-09

## 2025-01-09 RX ORDER — BENZOCAINE/MENTH/CETYLPYRD CL 15 MG-2 MG
1 LOZENGE MUCOUS MEMBRANE EVERY 6 HOURS PRN
Qty: 20 LOZENGE | Refills: 0 | Status: SHIPPED | OUTPATIENT
Start: 2025-01-09 | End: 2025-01-29

## 2025-01-09 NOTE — ED PROVIDER NOTES
Patient Seen in: [facility]      History     Chief Complaint   Patient presents with    Sore Throat    Runny Nose     Stated Complaint: sore throat, runny nose, spitting up blood, facial pain    Subjective:   HPI    Patient is a 64-year-old  female with hyperlipidemia, allergic rhinitis, sinusitis, GERD, presenting to immediate care for evaluation of nasal/sinus congestion for approximately 1 week.  Symptoms worsening.  Having associated increased sinus pain/pressure with associated nasal drainage with intermittent nosebleeds and postnasal drip with sore throat.  Taking over-the-counter allergy medication and nasal spray for symptoms with no improvement.  Concern for underlying infection.  No fevers.  No facial swelling.  No trismus or drooling.  No neck stiffness.  No cough.  No chest pain or shortness of breath.  No recent travel.  No known sick contacts.  Penicillin allergy.      Objective:     Past Medical History:    Disorder of thyroid    hypothyroid    Visual impairment    reading glasses              Past Surgical History:   Procedure Laterality Date    Colonoscopy      Egd      Total abdominal hysterectomy N/A 11/28/2018    Procedure: ABDOMINAL HYSTERECTOMY;  Surgeon: Yared Berry MD;  Location: [facility] MAIN OR                Social History     Socioeconomic History    Marital status: Single   Tobacco Use    Smoking status: Former     Types: Cigarettes     Passive exposure: Never    Smokeless tobacco: Never    Tobacco comments:     Social smoking   Vaping Use    Vaping status: Never Used   Substance and Sexual Activity    Alcohol use: No    Drug use: No   Other Topics Concern    Right Handed Yes              Review of Systems   Constitutional:  Negative for chills and fever.   HENT:  Positive for congestion, nosebleeds, sinus pressure and sinus pain.    Respiratory:  Negative for cough and shortness of breath.    Cardiovascular:  Negative for chest pain.   Gastrointestinal:  Negative  for abdominal pain, nausea and vomiting.   Musculoskeletal:  Negative for neck pain and neck stiffness.   Skin:  Negative for rash.   Allergic/Immunologic: Negative for immunocompromised state.   Neurological:  Negative for dizziness, weakness, light-headedness and headaches.   Psychiatric/Behavioral:  Negative for confusion.        Positive for stated complaint: sore throat, runny nose, spitting up blood, facial pain  Other systems are as noted in HPI.  Constitutional and vital signs reviewed.      All other systems reviewed and negative except as noted above.    Physical Exam     ED Triage Vitals [01/09/25 1319]   /72   Pulse 79   Resp 18   Temp 98.5 °F (36.9 °C)   Temp src Oral   SpO2 98 %   O2 Device None (Room air)       Current Vitals:   Vital Signs  BP: 143/72  Pulse: 79  Resp: 18  Temp: 98.5 °F (36.9 °C)  Temp src: Oral    Oxygen Therapy  SpO2: 98 %  O2 Device: None (Room air)        Physical Exam  Vitals and nursing note reviewed.   Constitutional:       General: She is not in acute distress.     Appearance: Normal appearance. She is well-developed. She is not ill-appearing, toxic-appearing or diaphoretic.   HENT:      Head: Normocephalic and atraumatic.      Right Ear: Tympanic membrane normal. No middle ear effusion. Tympanic membrane is not erythematous.      Left Ear: Tympanic membrane normal.  No middle ear effusion. Tympanic membrane is not erythematous.      Ears:      Comments: Frontal maxillary sinus tenderness     Nose: Congestion present.      Comments: Nasal congestion with hyperemia left septum.     Mouth/Throat:      Mouth: Mucous membranes are moist.      Pharynx: No oropharyngeal exudate or posterior oropharyngeal erythema.      Comments: Uvula midline.  Postnasal drip.  No oropharyngeal edema.  No trismus or drooling.  Eyes:      Conjunctiva/sclera: Conjunctivae normal.   Cardiovascular:      Rate and Rhythm: Normal rate and regular rhythm.      Pulses: Normal pulses.   Pulmonary:       Effort: Pulmonary effort is normal. No respiratory distress.      Breath sounds: Normal breath sounds. No stridor. No wheezing or rhonchi.   Abdominal:      Palpations: Abdomen is soft.      Tenderness: There is no abdominal tenderness.   Musculoskeletal:         General: No swelling or tenderness. Normal range of motion.      Cervical back: Normal range of motion and neck supple. No rigidity or tenderness.   Lymphadenopathy:      Cervical: No cervical adenopathy.   Skin:     Capillary Refill: Capillary refill takes less than 2 seconds.   Neurological:      General: No focal deficit present.      Mental Status: She is alert and oriented to person, place, and time.      Motor: No weakness.      Gait: Gait normal.   Psychiatric:         Mood and Affect: Mood normal.         Behavior: Behavior normal.         Thought Content: Thought content normal.         Judgment: Judgment normal.       ED Course     Labs Reviewed   POCT RAPID STREP - Normal   POCT FLU TEST - Normal    Narrative:     This assay is a rapid molecular in vitro test utilizing nucleic acid amplification of influenza A and B viral RNA.   RAPID SARS-COV-2 BY PCR - Normal     Results for orders placed or performed during the hospital encounter of 01/09/25   POCT Flu Test    Collection Time: 01/09/25  1:25 PM    Specimen: Nares; Other   Result Value Ref Range    POCT INFLUENZA A Negative Negative    POCT INFLUENZA B Negative Negative   Rapid SARS-CoV-2 by PCR    Collection Time: 01/09/25  1:25 PM    Specimen: Nares; Other   Result Value Ref Range    Rapid SARS-CoV-2 by PCR Not Detected Not Detected   POCT Rapid Strep    Collection Time: 01/09/25  1:38 PM   Result Value Ref Range    POCT Rapid Strep Negative Negative            MDM     Dx: Acute nonrecurrent maxillary sinusitis, Initial Encounter  History of allergic rhinitis/sinusitis  URI cough and congestion for 1 week  Worsening symptoms  + PODS Criteria  Influenza, COVID, strep testing negative  No  systemic symptoms  Overall well-appearing  Afebrile  Outpatient Management  Supportive Care  Rest Oral Hydration  Motrin/Tylenol as needed for pain/fever  Afrin nasal spray for nasal congestion  Rx Medrol Dosepak for sinusitis/allergic rhinitis  Rx doxycycline twice daily for 10 days for acute bacterial sinusitis  Discharge instructions on sinusitis  PCP follow-up  ED return precautions          Medical Decision Making      Disposition and Plan     Clinical Impression:  1. Acute non-recurrent maxillary sinusitis    2. Viral pharyngitis    3. Encounter for laboratory testing for COVID-19 virus    4. History of allergic rhinitis         Disposition:  Discharge  1/9/2025  1:57 pm    Follow-up:  Ervin Andrade DO  [address]  [facility] [address]  [phone]          River Ghotra MD  [address]  [facility] [address] [phone]      ENT (Ear Nose and Throat ) Doctor, As needed          Medications Prescribed:  Discharge Medication List as of 1/9/2025  1:57 PM        START taking these medications    Details   oxymetazoline 0.05 % Nasal Solution 1 spray by Nasal route every 4 (four) hours as needed for congestion (nasal congestion, nosebleed)., Normal, Disp-15 mL, R-0      Benzocaine-Menthol (CEPACOL SORE THROAT) 10-2.1 MG Mouth/Throat Lozenge Use as directed 1 lozenge in the mouth or throat every 6 (six) hours as needed., Normal, Disp-20 lozenge, R-0      doxycycline 100 MG Oral Cap Take 1 capsule (100 mg total) by mouth 2 (two) times daily for 10 days., Normal, Disp-20 capsule, R-0      methylPREDNISolone (MEDROL) 4 MG Oral Tablet Therapy Pack Dosepack: take as directed, Normal, Disp-1 each, R-0                 Supplementary Documentation:

## 2025-02-28 NOTE — PROGRESS NOTES
Subjective:   Merary Mckeon is a 64 year old female who presents for Physical (Lab order, (Accepted Influenza Vaccine),)   Patient is a pleasant 64-year-old female with past medical history consistent for hyperlipidemia, hypertension, hypothyroidism, vitamin D deficiency, reflux, and allergic rhinitis.  Patient presents to office today for physical.  Patient states her health is ok.     Diet: Mostly home cooked meals. Well balanced diet, avoids milk.   Exercise: She is in PT for back pain. No real exercise regimen. Educated on walking.   Sleep: 7 hours per night. No trouble falling asleep or staying asleep.   Stress: its ok, likes to collect stamps.   Social: Single, 4 grown kids, 7 grandbabies, Has family in [place]. Rents home in [place].   Sexually Active: no, likes to be by herself.   Prophylaxis: N/A  Alcohol: no, never.   Tobacco:can smoke on occasion. Advised cessation.   Work: Out of work right now since accident. She was a .  Vaccinations: Flu and pneumonia today, follow up for tetanus.   Mammogram: Needs referral today.   Colon: 4 years ago. Normal               Past Medical History:    Acute left ankle pain    COVID-19    Disorder of thyroid    hypothyroid    Epigastric pain    Gastrointestinal intolerance to foods    Hypothyroidism due to Hashimoto's thyroiditis    Immunization due    Influenza vaccine needed    Screening for heart disease    Sore throat    Strep pharyngitis    Thyroid pain    Visual impairment    reading glasses    Well adult exam    Women's annual routine gynecological examination      Past Surgical History:   Procedure Laterality Date    Colonoscopy      Egd      Total abdominal hysterectomy N/A 11/28/2018    Procedure: ABDOMINAL HYSTERECTOMY;  Surgeon: Yared Berry MD;  Location: [facility] MAIN OR        History/Other:    Chief Complaint Reviewed and Verified  Nursing Notes Reviewed and   Verified  Tobacco Reviewed  Allergies Reviewed  Medications Reviewed     Problem List Reviewed  Medical History Reviewed  Surgical History   Reviewed  OB Status Reviewed  Family History Reviewed  Social History   Reviewed         Tobacco:  She smoked tobacco in the past but quit greater than 12 months ago.  Social History     Tobacco Use   Smoking Status Former    Types: Cigarettes    Passive exposure: Never   Smokeless Tobacco Never   Tobacco Comments    Social smoking        Current Outpatient Medications   Medication Sig Dispense Refill    levothyroxine 75 MCG Oral Tab Take 1 tablet (75 mcg total) by mouth before breakfast. 90 tablet 1    oxymetazoline 0.05 % Nasal Solution 1 spray by Nasal route every 4 (four) hours as needed for congestion (nasal congestion, nosebleed). (Patient not taking: Reported on 3/3/2025) 15 mL 0    Betamethasone Dipropionate Aug 0.05 % External Ointment Apply by topical route twice a day to the affected area(s); do not exceed 45 grams per week. (Patient not taking: Reported on 3/3/2025) 15 g 0    fluticasone propionate 50 MCG/ACT Nasal Suspension 2 sprays by Each Nare route daily for 360 doses. (Patient not taking: Reported on 3/3/2025) 9.9 mL 0    albuterol 108 (90 Base) MCG/ACT Inhalation Aero Soln Inhale 2 puffs into the lungs every 4 (four) hours as needed for Wheezing or Shortness of Breath. (Patient not taking: Reported on 3/3/2025) 18 g 5    Fenofibrate 134 MG Oral Cap Take 1 capsule by mouth daily. (Patient not taking: Reported on 3/3/2025) 30 capsule 0    rosuvastatin 40 MG Oral Tab Take 1 tablet (40 mg total) by mouth nightly. (Patient not taking: Reported on 3/3/2025) 90 tablet 1    omega-3-acid ethyl esters 1 g Oral Cap Take two capsules twice a day (Patient not taking: Reported on 3/3/2025) 360 capsule 3    albuterol 108 (90 Base) MCG/ACT Inhalation Aero Soln Inhale 2 puffs into the lungs every 4 (four) hours as needed for Wheezing or Shortness of Breath. (Patient not taking: Reported on 3/3/2025) 18 g 5    Spacer/Aero-Holding Chambers  Does not apply Device Use with hfa inhaler as directed (Patient not taking: Reported on 3/3/2025) 1 each 0         Review of Systems:  Review of Systems   Constitutional: Negative.  Negative for activity change, appetite change, chills and fever.   HENT: Negative.  Negative for congestion, postnasal drip, rhinorrhea, sore throat, tinnitus and voice change.    Eyes: Negative.    Respiratory: Negative.  Negative for apnea, cough, chest tightness and shortness of breath.    Cardiovascular:  Negative for chest pain and leg swelling.   Gastrointestinal: Negative.  Negative for abdominal pain, anal bleeding, blood in stool, constipation, diarrhea, nausea and vomiting.   Genitourinary: Negative.  Negative for difficulty urinating, flank pain and menstrual problem.   Musculoskeletal: Negative.  Negative for joint swelling.   Skin:  Positive for rash.   Neurological: Negative.  Negative for dizziness and headaches.   Psychiatric/Behavioral: Negative.  Negative for agitation.          Objective:   /65 (BP Location: Right arm, Patient Position: Sitting, Cuff Size: large)   Pulse 61   Ht 5' 5\" (1.651 m)   Wt 138 lb 12.8 oz (63 kg)   LMP 11/20/2010   SpO2 96%   BMI 23.10 kg/m²  Estimated body mass index is 23.1 kg/m² as calculated from the following:    Height as of this encounter: 5' 5\" (1.651 m).    Weight as of this encounter: 138 lb 12.8 oz (63 kg).  Physical Exam  Vitals and nursing note reviewed.   Constitutional:       General: She is not in acute distress.     Appearance: She is well-developed.   HENT:      Head: Normocephalic and atraumatic.      Right Ear: Tympanic membrane, ear canal and external ear normal. There is no impacted cerumen.      Left Ear: Tympanic membrane, ear canal and external ear normal. There is no impacted cerumen.      Nose: Nose normal. No congestion or rhinorrhea.      Mouth/Throat:      Mouth: Mucous membranes are moist.      Pharynx: No oropharyngeal exudate or posterior  oropharyngeal erythema.   Eyes:      Conjunctiva/sclera: Conjunctivae normal.      Pupils: Pupils are equal, round, and reactive to light.   Neck:      Thyroid: No thyromegaly.   Cardiovascular:      Rate and Rhythm: Normal rate and regular rhythm.      Pulses: Normal pulses.      Heart sounds: Normal heart sounds. No murmur heard.  Pulmonary:      Effort: Pulmonary effort is normal. No respiratory distress.      Breath sounds: Normal breath sounds. No wheezing or rales.   Chest:      Chest wall: No tenderness.   Abdominal:      General: Bowel sounds are normal. There is no distension.      Palpations: Abdomen is soft.      Tenderness: There is no abdominal tenderness.   Musculoskeletal:         General: No tenderness. Normal range of motion.      Cervical back: Normal range of motion and neck supple.   Lymphadenopathy:      Cervical: No cervical adenopathy.   Skin:     General: Skin is warm and dry.      Capillary Refill: Capillary refill takes less than 2 seconds.      Findings: No rash.             Comments: Small patches actinic keratosis   Neurological:      Mental Status: She is alert and oriented to person, place, and time.      Coordination: Coordination normal.   Psychiatric:         Behavior: Behavior normal.         Thought Content: Thought content normal.         Judgment: Judgment normal.           Assessment & Plan:   1. Encounter for wellness examination in adult (Primary)  -     Hemoglobin A1C; Future; Expected date: 03/03/2025  -     CBC With Differential With Platelet; Future; Expected date: 03/03/2025  -     Comp Metabolic Panel (14); Future; Expected date: 03/03/2025  -     Lipid Panel; Future; Expected date: 03/03/2025  -     TSH W Reflex To Free T4; Future; Expected date: 03/03/2025  -     [facility] YAS 2D+3D SCREENING BILAT (CPT=77067/31849); Future; Expected date: 03/03/2025  -     Vitamin D; Future; Expected date: 03/03/2025  2. Mixed hyperlipidemia  -     Hemoglobin A1C; Future; Expected date:  03/03/2025  -     Lipid Panel; Future; Expected date: 03/03/2025  3. Primary hypertension  -     Comp Metabolic Panel (14); Future; Expected date: 03/03/2025  -     TSH W Reflex To Free T4; Future; Expected date: 03/03/2025  4. Acquired hypothyroidism  -     TSH W Reflex To Free T4; Future; Expected date: 03/03/2025  5. Vitamin D deficiency  -     Vitamin D; Future; Expected date: 03/03/2025  6. Breast cancer screening by mammogram  -     [facility] YAS 2D+3D SCREENING BILAT (CPT=77067/27296); Future; Expected date: 03/03/2025  7. Influenza vaccine needed  8. Need for pneumococcal vaccine  -     Prevnar 20 (PCV20) [79192]  9. Actinic keratosis  10. Need for immunization against influenza  -     Fluzone trivalent vaccine, PF 0.5mL, 6mo+ (48466)    1. Encounter for wellness examination in adult  Wellness labs ordered.  Encouraged increasing physical activity which includes raising heart rate 3 to 5 days/week for at least 30 minutes at a time, while also performing mild strength exercises.  Patient counseled on importance of dietary modifications, which may include limiting fats, red meat, and carbohydrates, while increasing fruit and vegetable intake, and trying to adhere to a low sodium/Mediterranean diet.  Encouraged to manage stress.  Encouraged good sleep habits.  Encouraged good sexual health.    - Hemoglobin A1C; Future  - CBC With Differential With Platelet; Future  - Comp Metabolic Panel (14); Future  - Lipid Panel; Future  - TSH W Reflex To Free T4; Future  - [facility] YAS 2D+3D SCREENING BILAT (CPT=77067/02636); Future  - Vitamin D; Future    2. Mixed hyperlipidemia  Stable on statin and fenofibrate  Check fasting labs  CPM  - Hemoglobin A1C; Future  - Lipid Panel; Future    3. Primary hypertension  Stable in office today   - Comp Metabolic Panel (14); Future  - TSH W Reflex To Free T4; Future    4. Acquired hypothyroidism  Stable on levothyroxine  Check tsh with wellness labs  Adjust as needed  - TSH W Reflex To Free  T4; Future    5. Vitamin D deficiency  Deficient in past  Check vitamin d with wellness labs   - Vitamin D; Future    6. Breast cancer screening by mammogram  Referral for mammogram today  - [facility] YAS 2D+3D SCREENING BILAT (CPT=77067/04401); Future    7. Influenza vaccine needed  Flu vaccine in office today   - Fluzone trivalent vaccine, PF 0.5mL, 6mo+ (33874)    8. Need for pneumococcal vaccine  Prevnar 20 in office today   - Prevnar 20 (PCV20) [85839]    9. Actinic keratosis  3 lesions found on exam  Cryotherapy administered  See procedure note.   Follow up one month    Patient aware of plan of care. All questions answered to satisfaction of the patient. Patient instructed to call office or reach out via "[employer]t if any issues arise. For urgent issues and/or reviewed red flags please proceed to the urgent care or ER.  Also, inform the nurse practitioner with any new symptoms or medication side effects.            Return in about 1 month (around 4/3/2025).    Peyman Kilpatrick, REGINE, 2/28/2025, 12:39 PM

## 2025-03-03 ENCOUNTER — OFFICE VISIT (OUTPATIENT)
Dept: FAMILY MEDICINE CLINIC | Facility: CLINIC | Age: 65
End: 2025-03-03

## 2025-03-03 VITALS
SYSTOLIC BLOOD PRESSURE: 111 MMHG | HEIGHT: 65 IN | DIASTOLIC BLOOD PRESSURE: 65 MMHG | OXYGEN SATURATION: 96 % | WEIGHT: 138.81 LBS | HEART RATE: 61 BPM | BODY MASS INDEX: 23.13 KG/M2

## 2025-03-03 DIAGNOSIS — I10 PRIMARY HYPERTENSION: ICD-10-CM

## 2025-03-03 DIAGNOSIS — Z23 NEED FOR PNEUMOCOCCAL VACCINE: ICD-10-CM

## 2025-03-03 DIAGNOSIS — Z23 INFLUENZA VACCINE NEEDED: ICD-10-CM

## 2025-03-03 DIAGNOSIS — L57.0 ACTINIC KERATOSIS: ICD-10-CM

## 2025-03-03 DIAGNOSIS — E78.2 MIXED HYPERLIPIDEMIA: ICD-10-CM

## 2025-03-03 DIAGNOSIS — Z00.00 ENCOUNTER FOR WELLNESS EXAMINATION IN ADULT: Primary | ICD-10-CM

## 2025-03-03 DIAGNOSIS — E03.9 ACQUIRED HYPOTHYROIDISM: ICD-10-CM

## 2025-03-03 DIAGNOSIS — Z12.31 BREAST CANCER SCREENING BY MAMMOGRAM: ICD-10-CM

## 2025-03-03 DIAGNOSIS — E55.9 VITAMIN D DEFICIENCY: ICD-10-CM

## 2025-03-03 PROBLEM — U07.1 COVID-19: Status: RESOLVED | Noted: 2024-01-10 | Resolved: 2025-03-03

## 2025-03-03 PROBLEM — Z01.419 WOMEN'S ANNUAL ROUTINE GYNECOLOGICAL EXAMINATION: Status: RESOLVED | Noted: 2020-11-12 | Resolved: 2025-03-03

## 2025-03-03 PROBLEM — R10.13 EPIGASTRIC PAIN: Status: RESOLVED | Noted: 2019-11-19 | Resolved: 2025-03-03

## 2025-03-03 PROBLEM — Z13.6 SCREENING FOR HEART DISEASE: Status: RESOLVED | Noted: 2024-02-23 | Resolved: 2025-03-03

## 2025-03-03 PROBLEM — K90.49 GASTROINTESTINAL INTOLERANCE TO FOODS: Status: RESOLVED | Noted: 2021-12-28 | Resolved: 2025-03-03

## 2025-03-03 PROBLEM — E07.89 THYROID PAIN: Status: RESOLVED | Noted: 2023-01-11 | Resolved: 2025-03-03

## 2025-03-03 PROBLEM — E06.3 HYPOTHYROIDISM DUE TO HASHIMOTO'S THYROIDITIS: Status: RESOLVED | Noted: 2018-08-10 | Resolved: 2025-03-03

## 2025-03-03 PROBLEM — M25.572 ACUTE LEFT ANKLE PAIN: Status: RESOLVED | Noted: 2024-01-26 | Resolved: 2025-03-03

## 2025-03-03 PROBLEM — J02.9 SORE THROAT: Status: RESOLVED | Noted: 2024-09-06 | Resolved: 2025-03-03

## 2025-03-03 PROBLEM — J02.0 STREP PHARYNGITIS: Status: RESOLVED | Noted: 2024-09-06 | Resolved: 2025-03-03

## 2025-03-03 PROCEDURE — 90472 IMMUNIZATION ADMIN EACH ADD: CPT

## 2025-03-03 PROCEDURE — 17000 DESTRUCT PREMALG LESION: CPT

## 2025-03-03 PROCEDURE — 3074F SYST BP LT 130 MM HG: CPT

## 2025-03-03 PROCEDURE — 90656 IIV3 VACC NO PRSV 0.5 ML IM: CPT

## 2025-03-03 PROCEDURE — 90471 IMMUNIZATION ADMIN: CPT

## 2025-03-03 PROCEDURE — 3078F DIAST BP <80 MM HG: CPT

## 2025-03-03 PROCEDURE — 3008F BODY MASS INDEX DOCD: CPT

## 2025-03-03 PROCEDURE — 90677 PCV20 VACCINE IM: CPT

## 2025-03-03 NOTE — PROGRESS NOTES
Keratotic lesions were treated with cryotherapy with liquid nitrogen, 3 treatments of 10 seconds each applied.2-3 mm Frost ring obtained. Patient tolerated procedure well. Will follow up in office one month. After care discussed.    REGINE Reagan

## 2025-03-07 ENCOUNTER — LAB ENCOUNTER (OUTPATIENT)
Dept: LAB | Age: 65
End: 2025-03-07
Payer: COMMERCIAL

## 2025-03-07 DIAGNOSIS — E55.9 VITAMIN D DEFICIENCY: ICD-10-CM

## 2025-03-07 DIAGNOSIS — I10 PRIMARY HYPERTENSION: ICD-10-CM

## 2025-03-07 DIAGNOSIS — E78.2 MIXED HYPERLIPIDEMIA: ICD-10-CM

## 2025-03-07 DIAGNOSIS — E03.9 ACQUIRED HYPOTHYROIDISM: ICD-10-CM

## 2025-03-07 DIAGNOSIS — Z00.00 ENCOUNTER FOR WELLNESS EXAMINATION IN ADULT: ICD-10-CM

## 2025-03-07 LAB
ALBUMIN SERPL-MCNC: 4.7 G/DL (ref 3.2–4.8)
ALBUMIN/GLOB SERPL: 1.9 {RATIO} (ref 1–2)
ALP LIVER SERPL-CCNC: 102 U/L
ALT SERPL-CCNC: 18 U/L
ANION GAP SERPL CALC-SCNC: 8 MMOL/L (ref 0–18)
AST SERPL-CCNC: 17 U/L (ref ?–34)
BASOPHILS # BLD AUTO: 0.03 X10(3) UL (ref 0–0.2)
BASOPHILS NFR BLD AUTO: 0.6 %
BILIRUB SERPL-MCNC: 0.6 MG/DL (ref 0.2–1.1)
BUN BLD-MCNC: 15 MG/DL (ref 9–23)
BUN/CREAT SERPL: 17.9 (ref 10–20)
CALCIUM BLD-MCNC: 9.4 MG/DL (ref 8.7–10.4)
CHLORIDE SERPL-SCNC: 105 MMOL/L (ref 98–112)
CHOLEST SERPL-MCNC: 253 MG/DL (ref ?–200)
CO2 SERPL-SCNC: 30 MMOL/L (ref 21–32)
CREAT BLD-MCNC: 0.84 MG/DL
DEPRECATED RDW RBC AUTO: 39.8 FL (ref 35.1–46.3)
EGFRCR SERPLBLD CKD-EPI 2021: 78 ML/MIN/1.73M2 (ref 60–?)
EOSINOPHIL # BLD AUTO: 0.09 X10(3) UL (ref 0–0.7)
EOSINOPHIL NFR BLD AUTO: 1.8 %
ERYTHROCYTE [DISTWIDTH] IN BLOOD BY AUTOMATED COUNT: 11.9 % (ref 11–15)
EST. AVERAGE GLUCOSE BLD GHB EST-MCNC: 128 MG/DL (ref 68–126)
FASTING PATIENT LIPID ANSWER: YES
FASTING STATUS PATIENT QL REPORTED: YES
GLOBULIN PLAS-MCNC: 2.5 G/DL (ref 2–3.5)
GLUCOSE BLD-MCNC: 96 MG/DL (ref 70–99)
HBA1C MFR BLD: 6.1 % (ref ?–5.7)
HCT VFR BLD AUTO: 46 %
HDLC SERPL-MCNC: 50 MG/DL (ref 40–59)
HGB BLD-MCNC: 15.8 G/DL
IMM GRANULOCYTES # BLD AUTO: 0.01 X10(3) UL (ref 0–1)
IMM GRANULOCYTES NFR BLD: 0.2 %
LDLC SERPL CALC-MCNC: 169 MG/DL (ref ?–100)
LYMPHOCYTES # BLD AUTO: 2.12 X10(3) UL (ref 1–4)
LYMPHOCYTES NFR BLD AUTO: 42.5 %
MCH RBC QN AUTO: 31.8 PG (ref 26–34)
MCHC RBC AUTO-ENTMCNC: 34.3 G/DL (ref 31–37)
MCV RBC AUTO: 92.6 FL
MONOCYTES # BLD AUTO: 0.48 X10(3) UL (ref 0.1–1)
MONOCYTES NFR BLD AUTO: 9.6 %
NEUTROPHILS # BLD AUTO: 2.26 X10 (3) UL (ref 1.5–7.7)
NEUTROPHILS # BLD AUTO: 2.26 X10(3) UL (ref 1.5–7.7)
NEUTROPHILS NFR BLD AUTO: 45.3 %
NONHDLC SERPL-MCNC: 203 MG/DL (ref ?–130)
OSMOLALITY SERPL CALC.SUM OF ELEC: 297 MOSM/KG (ref 275–295)
PLATELET # BLD AUTO: 210 10(3)UL (ref 150–450)
POTASSIUM SERPL-SCNC: 4.5 MMOL/L (ref 3.5–5.1)
PROT SERPL-MCNC: 7.2 G/DL (ref 5.7–8.2)
RBC # BLD AUTO: 4.97 X10(6)UL
SODIUM SERPL-SCNC: 143 MMOL/L (ref 136–145)
TRIGL SERPL-MCNC: 185 MG/DL (ref 30–149)
TSI SER-ACNC: 4.55 UIU/ML (ref 0.55–4.78)
VIT D+METAB SERPL-MCNC: 35.6 NG/ML (ref 30–100)
VLDLC SERPL CALC-MCNC: 37 MG/DL (ref 0–30)
WBC # BLD AUTO: 5 X10(3) UL (ref 4–11)

## 2025-03-07 PROCEDURE — 36415 COLL VENOUS BLD VENIPUNCTURE: CPT

## 2025-03-07 PROCEDURE — 83036 HEMOGLOBIN GLYCOSYLATED A1C: CPT

## 2025-03-07 PROCEDURE — 84443 ASSAY THYROID STIM HORMONE: CPT

## 2025-03-07 PROCEDURE — 80061 LIPID PANEL: CPT

## 2025-03-07 PROCEDURE — 85025 COMPLETE CBC W/AUTO DIFF WBC: CPT

## 2025-03-07 PROCEDURE — 80053 COMPREHEN METABOLIC PANEL: CPT

## 2025-03-07 PROCEDURE — 82306 VITAMIN D 25 HYDROXY: CPT

## 2025-03-07 NOTE — PROGRESS NOTES
1st Attempt:  3/07   L/vm to call back also advised to check mychart for results    Call patient and inquire if she's taking her cholesterol meds? Her cholesterol has worsened. Would recommend restarting if off meds.

## 2025-03-10 ENCOUNTER — TELEPHONE (OUTPATIENT)
Dept: FAMILY MEDICINE CLINIC | Facility: CLINIC | Age: 65
End: 2025-03-10

## 2025-03-10 DIAGNOSIS — E78.2 MIXED HYPERLIPIDEMIA: Primary | ICD-10-CM

## 2025-03-10 NOTE — TELEPHONE ENCOUNTER
Patient called back, verified name/.  States she has been off lipid lowering medication x 3 months but she saw the lipid panel results and has restarted meds (fenofibrate, rosuvastatin).     Jovana Tyler MA  3/10/2025  8:55 AM CDT Back to Top      2nd Attempt:  3/10   L/ to call back also advised to check mychart for results     Call patient and inquire if she's taking her cholesterol meds? Her cholesterol has worsened. Would recommend restarting if off meds.

## 2025-03-10 NOTE — PROGRESS NOTES
2nd Attempt:  3/10   L/vm to call back also advised to check mychart for results    Call patient and inquire if she's taking her cholesterol meds? Her cholesterol has worsened. Would recommend restarting if off meds.

## 2025-03-10 NOTE — PROGRESS NOTES
1. Mixed hyperlipidemia  The 10-year ASCVD risk score (Janet LLANES, et al., 2019) is: 4.6%    Values used to calculate the score:      Age: 64 years      Sex: Female      Is Non- : No      Diabetic: No      Tobacco smoker: No      Systolic Blood Pressure: 111 mmHg      Is BP treated: No      HDL Cholesterol: 50 mg/dL      Total Cholesterol: 253 mg/dL  Has been off both statin and fenofibrate x 3 months.  Advise restarting medications.  Recheck cholesterol 6 months fasting.  - Lipid Panel; Future    Peyman Kilpatrick, APRN

## 2025-07-09 ENCOUNTER — OFFICE VISIT (OUTPATIENT)
Dept: INTERNAL MEDICINE CLINIC | Facility: CLINIC | Age: 65
End: 2025-07-09

## 2025-07-09 VITALS
HEIGHT: 65 IN | SYSTOLIC BLOOD PRESSURE: 138 MMHG | WEIGHT: 134 LBS | BODY MASS INDEX: 22.33 KG/M2 | DIASTOLIC BLOOD PRESSURE: 79 MMHG | HEART RATE: 80 BPM

## 2025-07-09 DIAGNOSIS — R13.19 ESOPHAGEAL DYSPHAGIA: ICD-10-CM

## 2025-07-09 DIAGNOSIS — E03.9 ACQUIRED HYPOTHYROIDISM: ICD-10-CM

## 2025-07-09 DIAGNOSIS — K13.79 LESION OF PALATE: ICD-10-CM

## 2025-07-09 DIAGNOSIS — Z87.19 HISTORY OF DUODENAL ULCER: ICD-10-CM

## 2025-07-09 DIAGNOSIS — R49.9 CHANGE IN VOICE: ICD-10-CM

## 2025-07-09 DIAGNOSIS — K21.00 GASTROESOPHAGEAL REFLUX DISEASE WITH ESOPHAGITIS WITHOUT HEMORRHAGE: ICD-10-CM

## 2025-07-09 DIAGNOSIS — Z76.89 ENCOUNTER TO ESTABLISH CARE WITH NEW DOCTOR: Primary | ICD-10-CM

## 2025-07-09 DIAGNOSIS — D24.1 BREAST FIBROADENOMA IN FEMALE, RIGHT: ICD-10-CM

## 2025-07-09 PROCEDURE — 99204 OFFICE O/P NEW MOD 45 MIN: CPT | Performed by: STUDENT IN AN ORGANIZED HEALTH CARE EDUCATION/TRAINING PROGRAM

## 2025-07-09 RX ORDER — OMEPRAZOLE 40 MG/1
40 CAPSULE, DELAYED RELEASE ORAL DAILY
Qty: 90 CAPSULE | Refills: 3 | Status: SHIPPED | OUTPATIENT
Start: 2025-07-09 | End: 2025-07-09

## 2025-07-09 RX ORDER — OMEPRAZOLE 40 MG/1
40 CAPSULE, DELAYED RELEASE ORAL DAILY
Qty: 90 CAPSULE | Refills: 3 | Status: SHIPPED
Start: 2025-07-09 | End: 2026-07-04

## 2025-07-09 RX ORDER — LEVOCETIRIZINE DIHYDROCHLORIDE 5 MG/1
5 TABLET, FILM COATED ORAL EVERY EVENING
COMMUNITY

## 2025-07-09 NOTE — PROGRESS NOTES
OFFICE NOTE       The following individual(s) verbally consented to be recorded using ambient AI listening technology and understand that they can each withdraw their consent to this listening technology at any point by asking the clinician to turn off or pause the recording:    Patient name: Merary Mckeon  Additional names:            Patient ID: Merary Mckeon is a 65 year old female.  Today's Date: 07/09/25  Chief Complaint: Establish Care      History of Present Illness  Merary Mckeon is a 65 year old female with acquired hypothyroidism who presents with throat pain and voice changes.    She experiences throat pain and frequent sickness in her throat, particularly at night, accompanied by a persistent cough. Despite being on levothyroxine 75 micrograms for the past ten years for hypothyroidism, she experiences changes in her voice and a sensation of weakness when speaking extensively.    She has a history of GERD and reports frequent diarrhea, which she associates with her thyroid medication. A colonoscopy on January 18, 2022, was normal. She also has a history of duodenal ulcers diagnosed approximately 15-20 years ago, but no recent endoscopy has been performed. But has ongoing     She is due for a mammogram due to a history of fibroadenomas and calcifications in the breast. She has not had an ultrasound of her thyroid before.  No recent fever.       Vitals:    07/09/25 1706   BP: 138/79   Pulse: 80   Weight: 134 lb (60.8 kg)   Height: 5' 5\" (1.651 m)     body mass index is 22.3 kg/m².  BP Readings from Last 3 Encounters:   07/09/25 138/79   03/03/25 111/65   01/09/25 143/72     The 10-year ASCVD risk score (Janet LLANES, et al., 2019) is: 7.6%    Values used to calculate the score:      Age: 65 years      Sex: Female      Is Non- : No      Diabetic: No      Tobacco smoker: No      Systolic Blood Pressure: 138 mmHg      Is BP treated: No       HDL Cholesterol: 50 mg/dL      Total Cholesterol: 253 mg/dL  Results  DIAGNOSTIC  Colonoscopy: Normal (01/18/2022)    PATHOLOGY  Oral cavity lesion benign (10/21/2024)       Medications reviewed:  Current Medications[1]      Assessment & Plan    There are no diagnoses linked to this encounter.  Assessment & Plan  Acquired Hypothyroidism  Managed with levothyroxine 75 mcg for 10 years. Symptoms of throat pain, frequent sickness, and voice changes may indicate thyroid dysfunction.  - Order TSH, T4, thyroglobulin, thyroperoxidase, and thyrotropin receptor antibody tests.  - Order thyroid ultrasound.  - Recheck thyroid levels in 6-8 weeks.    Gastroesophageal Reflux Disease (GERD) with Esophagitis  GERD with esophagitis suspected to cause throat irritation. History of duodenal ulcers may affect thyroid medication absorption.  - Prescribe omeprazole.  - Refer to gastroenterologist for endoscopy.    Chronic Diarrhea  Chronic diarrhea possibly linked to thyroid medication and malabsorption from duodenal ulcers.  - Refer to gastroenterologist for further evaluation.    Breast Fibroadenoma  Breast fibroadenoma requires monitoring.  - Order screening mammogram.       Follow Up: As needed/if symptoms worsen or No follow-ups on file..       Objective/ Results:   Physical Exam  Constitutional:       Appearance: She is well-developed.   Cardiovascular:      Rate and Rhythm: Normal rate and regular rhythm.      Heart sounds: Normal heart sounds.   Pulmonary:      Effort: Pulmonary effort is normal.      Breath sounds: Normal breath sounds.   Abdominal:      General: Bowel sounds are normal.      Palpations: Abdomen is soft.   Skin:     General: Skin is warm and dry.   Neurological:      Mental Status: She is alert and oriented to person, place, and time.      Deep Tendon Reflexes: Reflexes are normal and symmetric.        Physical Exam       Reviewed:    Patient Active Problem List    Diagnosis    Reflux gastritis    Lesion of  palate    Skin lesions    Arthralgia of multiple joints    Subcutaneous nodule of left hand    Chronic diarrhea    Flat feet    Breast calcifications on mammogram    Sleep disturbance    Vitamin D deficiency    Breast cancer screening by mammogram    Change in voice    Primary hypertension    Breast fibroadenoma in female, right    Mixed hyperlipidemia    Allergic dermatitis    H/O abdominal hysterectomy     H/o STEWART 1, HPV+      Seasonal allergic rhinitis due to pollen    Family history of breast cancer in first degree relative    Acquired hypothyroidism      Allergies[2]   Short Social Hx on File[3]   Review of Systems   Constitutional: Negative.    HENT:  Positive for trouble swallowing and voice change.    Respiratory: Negative.     Cardiovascular: Negative.    Gastrointestinal: Negative.    Skin: Negative.    Neurological: Negative.        All other systems negative unless otherwise stated in ROS or HPI above.       Isaac Carr MD  Internal Medicine       Call office with any questions or seek emergency care if necessary.   Patient understands and agrees to follow directions and advice.      ----------------------------------------- PATIENT INSTRUCTIONS-----------------------------------------     There are no Patient Instructions on file for this visit.        The 21st Century Cures Act makes medical notes available to patients in the interest of transparency.  However, please be advised that this is a medical document.  It is intended as a peer to peer communication.  It is written in medical language and may contain abbreviations or verbiage that are technical and unfamiliar.  It may appear blunt or direct.  Medical documents are intended to carry relevant information, facts as evident, and the clinical opinion of the practitioner.          [1]   Current Outpatient Medications   Medication Sig Dispense Refill    levocetirizine 5 MG Oral Tab Take 1 tablet (5 mg total) by mouth every evening.      Fenofibrate  134 MG Oral Cap Take 1 capsule by mouth daily. 30 capsule 0    rosuvastatin 40 MG Oral Tab Take 1 tablet (40 mg total) by mouth nightly. 90 tablet 1    levothyroxine 75 MCG Oral Tab Take 1 tablet (75 mcg total) by mouth before breakfast. 90 tablet 1    oxymetazoline 0.05 % Nasal Solution 1 spray by Nasal route every 4 (four) hours as needed for congestion (nasal congestion, nosebleed). (Patient not taking: Reported on 7/9/2025) 15 mL 0    Betamethasone Dipropionate Aug 0.05 % External Ointment Apply by topical route twice a day to the affected area(s); do not exceed 45 grams per week. (Patient not taking: Reported on 7/9/2025) 15 g 0    fluticasone propionate 50 MCG/ACT Nasal Suspension 2 sprays by Each Nare route daily for 360 doses. (Patient not taking: Reported on 7/9/2025) 9.9 mL 0    albuterol 108 (90 Base) MCG/ACT Inhalation Aero Soln Inhale 2 puffs into the lungs every 4 (four) hours as needed for Wheezing or Shortness of Breath. (Patient not taking: Reported on 7/9/2025) 18 g 5    omega-3-acid ethyl esters 1 g Oral Cap Take two capsules twice a day (Patient not taking: Reported on 7/9/2025) 360 capsule 3    albuterol 108 (90 Base) MCG/ACT Inhalation Aero Soln Inhale 2 puffs into the lungs every 4 (four) hours as needed for Wheezing or Shortness of Breath. (Patient not taking: Reported on 7/9/2025) 18 g 5    Spacer/Aero-Holding Chambers Does not apply Device Use with hfa inhaler as directed (Patient not taking: Reported on 7/9/2025) 1 each 0   [2]   Allergies  Allergen Reactions    Amoxicillin UNKNOWN    Milk-Related Compounds DIARRHEA    Ampicillin DIARRHEA    Penicillins DIARRHEA     Confirmed with patient never had true allergy only stomach upset and diarrhea    [3]   Social History  Socioeconomic History    Marital status: Single   Tobacco Use    Smoking status: Former     Types: Cigarettes     Passive exposure: Never    Smokeless tobacco: Never    Tobacco comments:     Social smoking   Vaping Use     Vaping status: Never Used   Substance and Sexual Activity    Alcohol use: No    Drug use: No   Other Topics Concern    Right Handed Yes     Social Drivers of Health     Food Insecurity: No Food Insecurity (3/3/2025)    NCSS - Food Insecurity     Worried About Running Out of Food in the Last Year: No     Ran Out of Food in the Last Year: No   Transportation Needs: No Transportation Needs (3/3/2025)    NCSS - Transportation     Lack of Transportation: No   Housing Stability: Not At Risk (3/3/2025)    NCSS - Housing/Utilities     Has Housing: Yes     Worried About Losing Housing: No     Unable to Get Utilities: No

## 2025-07-10 ENCOUNTER — LAB ENCOUNTER (OUTPATIENT)
Dept: LAB | Age: 65
End: 2025-07-10
Attending: STUDENT IN AN ORGANIZED HEALTH CARE EDUCATION/TRAINING PROGRAM
Payer: MEDICARE

## 2025-07-10 DIAGNOSIS — E03.9 ACQUIRED HYPOTHYROIDISM: ICD-10-CM

## 2025-07-10 LAB
T4 FREE SERPL-MCNC: 1.2 NG/DL (ref 0.8–1.7)
THYROGLOB SERPL-MCNC: 243 U/ML (ref ?–60)
THYROPEROXIDASE AB SERPL-ACNC: 574 U/ML (ref ?–60)
TSI SER-ACNC: 3.03 UIU/ML (ref 0.55–4.78)

## 2025-07-10 PROCEDURE — 86800 THYROGLOBULIN ANTIBODY: CPT

## 2025-07-10 PROCEDURE — 36415 COLL VENOUS BLD VENIPUNCTURE: CPT

## 2025-07-10 PROCEDURE — 84439 ASSAY OF FREE THYROXINE: CPT

## 2025-07-10 PROCEDURE — 84443 ASSAY THYROID STIM HORMONE: CPT

## 2025-07-10 PROCEDURE — 86376 MICROSOMAL ANTIBODY EACH: CPT

## 2025-07-10 PROCEDURE — 83520 IMMUNOASSAY QUANT NOS NONAB: CPT

## 2025-07-11 ENCOUNTER — HOSPITAL ENCOUNTER (OUTPATIENT)
Dept: ULTRASOUND IMAGING | Facility: HOSPITAL | Age: 65
Discharge: HOME | End: 2025-07-11
Attending: STUDENT IN AN ORGANIZED HEALTH CARE EDUCATION/TRAINING PROGRAM
Payer: MEDICARE

## 2025-07-11 ENCOUNTER — OFFICE VISIT (OUTPATIENT)
Facility: CLINIC | Age: 65
End: 2025-07-11

## 2025-07-11 VITALS
BODY MASS INDEX: 22.33 KG/M2 | SYSTOLIC BLOOD PRESSURE: 111 MMHG | DIASTOLIC BLOOD PRESSURE: 69 MMHG | WEIGHT: 134 LBS | HEIGHT: 65 IN | HEART RATE: 70 BPM

## 2025-07-11 DIAGNOSIS — K21.9 GASTROESOPHAGEAL REFLUX DISEASE, UNSPECIFIED WHETHER ESOPHAGITIS PRESENT: Primary | ICD-10-CM

## 2025-07-11 DIAGNOSIS — K52.9 CHRONIC DIARRHEA: ICD-10-CM

## 2025-07-11 DIAGNOSIS — E03.9 ACQUIRED HYPOTHYROIDISM: ICD-10-CM

## 2025-07-11 PROCEDURE — 76536 US EXAM OF HEAD AND NECK: CPT | Performed by: STUDENT IN AN ORGANIZED HEALTH CARE EDUCATION/TRAINING PROGRAM

## 2025-07-11 PROCEDURE — 99214 OFFICE O/P EST MOD 30 MIN: CPT | Performed by: NURSE PRACTITIONER

## 2025-07-11 NOTE — PATIENT INSTRUCTIONS
Fiber Supplements (pick one)  -Metamucil or Konsyl (psyllium- both soluble & insoluble) *  -Fibercon (polycarbophil- mostly insoluble)  -Citrucel (methylcellulose mostly soluble)   -Benefiber (wheat dextrin- mostly soluble)    Soluble better for diarrhea, insoluble better for constipation   --------------------------------------    1. Schedule upper endoscopy (EGD) with Dr. Rice [Diagnosis: hx intestinal metaplasia, GERD]    Medication Changes: none    2. If you start any NEW medication after your visit today, please notify us. Certain medications will need to be held before the procedure, or the procedure cannot be performed safely.     3. DO NOT TAKE: Iron (ferrous sulfate/ ferrous gluconate) pills, herbal supplements, multivitamins, or diet medications (i.e. Phentermine/Vyvanse) for 7 days before exam.  DO NOT TAKE: Any form of alcohol, recreational drugs and any forms of Erectile Dysfunction medications 24 hours prior to procedure.    4. The day BEFORE your procedure, NOTHING TO EAT OR DRINK AFTER MIDNIGHT! If your procedure is scheduled in the afternoon, you may have clear liquids only up to 3 hours before the time of your procedure. If you fail to keep your stomach empty for 3 hours prior to procedure time, your procedure may be CANCELLED. Instructions can also be found at: [email or web address]

## 2025-07-11 NOTE — H&P
[facility] - Gastroenterology                                                                                                               Reason for consult: reflux     Requesting physician or provider: Ervin Andrade DO    Chief Complaint   Patient presents with    Reflux    Burping       HPI:   Merary Mckeon is a 65 year old year-old female with medical history of hyperlipidemia, hypertension, chronic diarrhea, GERD, hypothyroidism.     Last seen by Meghan Soliman 2021 for diarrhea:  #diarrhea  #n/v  #abd cramping  She is here today for eval of her post-prandial watery diarrhea, n/v. Diarrhea chronic and intermittent. N/v new onset and progressively worse. No clear antecedent to symptoms. She denies brbpr, melena.  Weight today stable from comparison.  No [facility] gi malignancy ibd, celiac.  Celiac testing 2019 (-). Recent labs w/ pcp unremarkable.  Reports abd ultrasound in may that was normal.  H/o cln 5 years ago reportedly normal and cln/egd 15 years ago (+) h.pylori.  Stool ag pending at [facility], however has been taking omeprazole prn and think will be unreliable.  No stool testing on file and order now.  Advise avoidance of dairy, wheat. Avoid nsaids. May use imodium prn.  Plan for cln/egd now to further eval microscopic colitis, pud, etc.     -avoid dairy, wheat, nsaids  -imodium as needed  -start omeprazole prescription  -reflux diet/bland diet  -stool testing  -er if condition decline    Today:  she is here today for evaluation of worsening reflux over the past 6 months.  Symptoms worse at night.  PCP sent Omeprazole 40mg this week, just started today.   Hx incomplete intestinal metaplasia on EGD in 2022.   Has chronic diarrhea at baseline. Takes Imodium daily.       Last colonoscopy/EGD:  Date of procedure: 01/18/22     Procedure: EGD w/biopsies & Colonoscopy w/biopsies     Pre-operative diagnosis:  diarrhea, n/v and abdominal pain/cramping     Post-operative diagnosis: gastric erythema and erosions, normal colonoscopy with hemorrhoids     Medications: MAC sedation  Withdrawal time: 8 minutes     Complications: none     Procedure: Informed consent was obtained from the patient after the risks of the procedure were discussed, including but not limited to bleeding, perforation, aspiration, infection, or possibility of a missed lesion. We discussed the risks/benefits and alternatives to this procedure, as well as the planned sedation. EGD procedure: The patient was placed in the left lateral decubitus position and begun on continuous blood pressure pulse oximetry and EKG monitoring and this was maintained throughout the procedure. Once an adequate level of sedation was obtained a bite block was placed. Then the lubricated tip of the [id number] diagnostic video upper endoscope was inserted and advanced using direct visualization into the posterior pharynx and ultimately into the esophagus. Colonoscopy procedure: Once an adequate level of sedation was obtained a digital rectal exam was completed. Then the lubricated tip of the [id number] diagnostic video colonoscope was inserted and advanced without difficulty to the cecum using the CO2 insufflation technique. The cecum was identified by localizing the trifold, the appendix and the ileocecal valve. A routine second examination of the cecum/ascending colon was performed. Withdrawal was begun with thorough washing and careful examination of the colonic walls and folds. Photodocumentation was obtained. The bowel prep was good. Views of the colon were good with washing. I then carefully withdrew the instrument from the patient who tolerated the procedure well.      Complications: None     EGD findings:       1. Esophagus: The squamocolumnar junction was noted at 38 cm and appeared regular. The GE junction was noted at 38 cm from the incisors. No significant  hiatal hernia. The esophageal mucosa appeared normal. There was no evidence of esophagitis, stricture or endoscopic evidence of Ortega's esophagus.  2. Stomach: The stomach distended normally. Normal rugal folds were seen. The pylorus was patent. The gastric mucosa appeared erythematous with erosions so biopsies taken for Hpylori. Retroflexion revealed a normal fundus and a normal cardia.   3. Duodenum: The duodenal mucosa appeared normal in the 1st and 2nd portion of the duodenum.      Colonoscopy findings:     1. ALBERT: normal rectal tone, no masses palpated.   2. NO polyp(s) noted   3. Terminal ileum: the visualized mucosa appeared normal.  4. The colonic mucosa throughout the colon showed normal vascular pattern, without evidence of angioectasias or inflammation. Random biopsies taken for microscopic colitis  5. Diverticulosis: none appreciated.  6. A retroflexed view of the rectum revealed hemorrhoids.    Final Diagnosis:      A. Duodenum; biopsy:  Duodenal mucosa with no significant pathologic change.   Preserved villous architecture.     B. Gastric biopsy:  Gastric antral mucosa with chronic inactive gastritis, superficial erosion, and incomplete intestinal metaplasia.  Negative for dysplasia.  Diff-Quik stain (with appropriate control reactivity), is negative for H. pylori microorganisms.     C. Random colon; biopsy:   Colonic mucosa with no significant pathologic change.  No evidence of lymphocytic or collagenous colitis.  Trichrome stain (with appropriately reacting control) demonstrates normal subepithelial collagen thickness.     For  purposes, this case was reviewed by a second pathologist who concurred with the diagnosis on part C.       NSAIDS: none  Tobacco: none  Alcohol: none  Marijuana: none  Illicit drugs: none    No family history of GI malignancy or IBD     No history of adverse reaction to sedation  No MATI  No anticoagulants/antiplatelet  No pacemaker/defibrillator    Wt  Readings from Last 6 Encounters:   07/11/25 134 lb (60.8 kg)   07/09/25 134 lb (60.8 kg)   03/03/25 138 lb 12.8 oz (63 kg)   10/03/24 136 lb 1.6 oz (61.7 kg)   09/27/24 134 lb (60.8 kg)   09/19/24 131 lb 9.6 oz (59.7 kg)        History, Medications, Allergies, ROS:      Past Medical History[1]   Past Surgical History[2]   Family Hx: Family History[3]   Social History: Short Social Hx on File[4]     Medications (Active prior to today's visit):  Current Medications[5]    Allergies:  Allergies[6]    ROS:   CONSTITUTIONAL: negative for fevers, chills, sweats  EYES Negative for scleral icterus or redness, and diplopia  HEENT: Negative for hoarseness  RESPIRATORY: Negative for cough and severe shortness of breath  CARDIOVASCULAR: Negative for crushing sub-sternal chest pain  GASTROINTESTINAL: See HPI  GENITOURINARY: Negative for dysuria  MUSCULOSKELETAL: Negative for arthralgias and myalgias  SKIN: Negative for jaundice, rash or pruritus  NEUROLOGICAL: Negative for dizziness and headaches  BEHAVIOR/PSYCH: Negative for psychotic behavior    PHYSICAL EXAM:   Blood pressure 111/69, pulse 70, height 5' 5\" (1.651 m), weight 134 lb (60.8 kg), last menstrual period 11/20/2010.    GEN: Alert, no acute distress, well-nourished   ABDOMEN: Soft, symmetrical, non-tender without distention or guarding. No scars or lesions. Umbilicus is midline without herniation. Normoactive bowel sounds are present, No masses, hepatomegaly or splenomegaly noted.  MSK: No erythema, no warmth, no swelling of joints  SKIN: No jaundice, no erythema, no rashes, no lesions  HEMATOLOGIC: No bleeding, no bruising  NEURO: Alert and interactive, MAI  PSYCH: appropriate mood & affect    Labs/Imaging/Procedures:     Patient's pertinent labs and imaging were reviewed and discussed with patient today.        .  ASSESSMENT/PLAN:   Merary Mckeon is a 65 year old year-old female with medical history of hyperlipidemia, hypertension, chronic diarrhea, GERD,  hypothyroidism.     1. Gastroesophageal reflux disease, unspecified whether esophagitis present    2. Chronic diarrhea    Presents for worsening reflux over the past 6 months. Hx incomplete metaplasia on EGD in 2022. Has been off PPI for years, just restarted this week. Tried other OTC meds such as gaviscon without relief. Continue daily Omeprazole 40mg. Schedule repeat EGD.     Recommendations:  -Follow up after procedure to discuss results    Patient Instructions   Fiber Supplements (pick one)  -Metamucil or Konsyl (psyllium- both soluble & insoluble) *  -Fibercon (polycarbophil- mostly insoluble)  -Citrucel (methylcellulose mostly soluble)   -Benefiber (wheat dextrin- mostly soluble)    Soluble better for diarrhea, insoluble better for constipation   --------------------------------------    1. Schedule upper endoscopy (EGD) with Dr. Rice [Diagnosis: hx intestinal metaplasia, GERD]    Medication Changes: none    2. If you start any NEW medication after your visit today, please notify us. Certain medications will need to be held before the procedure, or the procedure cannot be performed safely.     3. DO NOT TAKE: Iron (ferrous sulfate/ ferrous gluconate) pills, herbal supplements, multivitamins, or diet medications (i.e. Phentermine/Vyvanse) for 7 days before exam.  DO NOT TAKE: Any form of alcohol, recreational drugs and any forms of Erectile Dysfunction medications 24 hours prior to procedure.    4. The day BEFORE your procedure, NOTHING TO EAT OR DRINK AFTER MIDNIGHT! If your procedure is scheduled in the afternoon, you may have clear liquids only up to 3 hours before the time of your procedure. If you fail to keep your stomach empty for 3 hours prior to procedure time, your procedure may be CANCELLED. Instructions can also be found at: www.[facility].org/[email or web address]           Orders This Visit:  No orders of the defined types were placed in this encounter.      Meds This Visit:  Requested Prescriptions      No  prescriptions requested or ordered in this encounter       Imaging & Referrals:  None      REGINE Rivero    [facility] Gastroenterology  7/11/2025               [1]   Past Medical History:   Acute left ankle pain    COVID-19    Disorder of thyroid    hypothyroid    Epigastric pain    Gastrointestinal intolerance to foods    Hypothyroidism due to Hashimoto's thyroiditis    Immunization due    Influenza vaccine needed    Screening for heart disease    Sore throat    Strep pharyngitis    Thyroid pain    Visual impairment    reading glasses    Well adult exam    Women's annual routine gynecological examination   [2]   Past Surgical History:  Procedure Laterality Date    Colonoscopy      Egd      Total abdominal hysterectomy N/A 11/28/2018    Procedure: ABDOMINAL HYSTERECTOMY;  Surgeon: Yared Berry MD;  Location: [facility] MAIN OR   [3]   Family History  Problem Relation Age of Onset    Cancer Father         thyroid cancer    Diabetes Mother     Breast Cancer Mother 60    Breast Cancer Sister 50   [4]   Social History  Socioeconomic History    Marital status: Single   Tobacco Use    Smoking status: Former     Types: Cigarettes     Passive exposure: Never    Smokeless tobacco: Never    Tobacco comments:     Social smoking   Vaping Use    Vaping status: Never Used   Substance and Sexual Activity    Alcohol use: Yes     Comment: Social    Drug use: No   Other Topics Concern    Right Handed Yes     Social Drivers of Health     Food Insecurity: No Food Insecurity (3/3/2025)    NCSS - Food Insecurity     Worried About Running Out of Food in the Last Year: No     Ran Out of Food in the Last Year: No   Transportation Needs: No Transportation Needs (3/3/2025)    NCSS - Transportation     Lack of Transportation: No   Housing Stability: Not At Risk (3/3/2025)    NCSS - Housing/Utilities     Has Housing: Yes     Worried About Losing Housing: No     Unable to Get Utilities: No   [5]   Current Outpatient Medications    Medication Sig Dispense Refill    Omeprazole 40 MG Oral Capsule Delayed Release Take 1 capsule (40 mg total) by mouth daily. 90 capsule 3    rosuvastatin 40 MG Oral Tab Take 1 tablet (40 mg total) by mouth nightly. 90 tablet 1    levothyroxine 75 MCG Oral Tab Take 1 tablet (75 mcg total) by mouth before breakfast. 90 tablet 1    levocetirizine 5 MG Oral Tab Take 1 tablet (5 mg total) by mouth every evening. (Patient not taking: Reported on 7/11/2025)      oxymetazoline 0.05 % Nasal Solution 1 spray by Nasal route every 4 (four) hours as needed for congestion (nasal congestion, nosebleed). (Patient not taking: Reported on 7/11/2025) 15 mL 0    Betamethasone Dipropionate Aug 0.05 % External Ointment Apply by topical route twice a day to the affected area(s); do not exceed 45 grams per week. (Patient not taking: Reported on 7/11/2025) 15 g 0    fluticasone propionate 50 MCG/ACT Nasal Suspension 2 sprays by Each Nare route daily for 360 doses. (Patient not taking: Reported on 7/11/2025) 9.9 mL 0    albuterol 108 (90 Base) MCG/ACT Inhalation Aero Soln Inhale 2 puffs into the lungs every 4 (four) hours as needed for Wheezing or Shortness of Breath. (Patient not taking: Reported on 7/11/2025) 18 g 5    Fenofibrate 134 MG Oral Cap Take 1 capsule by mouth daily. (Patient not taking: Reported on 7/11/2025) 30 capsule 0    omega-3-acid ethyl esters 1 g Oral Cap Take two capsules twice a day (Patient not taking: Reported on 7/11/2025) 360 capsule 3    albuterol 108 (90 Base) MCG/ACT Inhalation Aero Soln Inhale 2 puffs into the lungs every 4 (four) hours as needed for Wheezing or Shortness of Breath. (Patient not taking: Reported on 7/11/2025) 18 g 5    Spacer/Aero-Holding Chambers Does not apply Device Use with hfa inhaler as directed (Patient not taking: Reported on 7/11/2025) 1 each 0   [6]   Allergies  Allergen Reactions    Amoxicillin UNKNOWN    Milk-Related Compounds DIARRHEA    Ampicillin DIARRHEA    Penicillins  DIARRHEA     Confirmed with patient never had true allergy only stomach upset and diarrhea

## 2025-07-12 ENCOUNTER — RESULTS FOLLOW-UP (OUTPATIENT)
Dept: INTERNAL MEDICINE CLINIC | Facility: CLINIC | Age: 65
End: 2025-07-12

## 2025-07-12 NOTE — PROGRESS NOTES
Please relay to patient if not read:     Dr. Bruno Santoro here  Your TSH is 3.028 and Free t4 1.2 is within normal limits so your thyroid supplementation dose of 75mcg daily is correct dose.     Your Thyroid peroxidase and antithyroglobulin confirms the cause is Hashimotos (thyroiditis)/hypothyroidism. Which means your body is attacking the thyroid gland so is unable to make thyroid hormone so why you need supplementation. Please make sure to follow up with Gastroenterologist to make sure there is no gastric ulcer or malabsorption process.   -Dr Carr

## 2025-07-15 PROBLEM — K31.9 GASTRIC ERYTHEMA: Status: ACTIVE | Noted: 2025-07-15

## 2025-07-15 LAB — THYROTROPIN REC AB: <1.1 IU/L

## 2025-07-21 ENCOUNTER — RESULTS FOLLOW-UP (OUTPATIENT)
Facility: CLINIC | Age: 65
End: 2025-07-21

## 2025-07-21 NOTE — TELEPHONE ENCOUNTER
----- Message from Cydney Rice sent at 7/21/2025 11:50 AM CDT -----  GI staff: please place recall for EGD in 3 years for IM    ----- Message -----  From: Golden Fishman In  Sent: 7/19/2025  11:17 AM CDT  To: Cydney Rice MD

## 2025-07-25 DIAGNOSIS — E03.9 ACQUIRED HYPOTHYROIDISM: ICD-10-CM

## 2025-07-28 RX ORDER — LEVOTHYROXINE SODIUM 75 UG/1
75 TABLET ORAL
Qty: 90 TABLET | Refills: 3 | Status: SHIPPED | OUTPATIENT
Start: 2025-07-28

## 2025-07-28 NOTE — TELEPHONE ENCOUNTER
For replies, please route to pool: [facility] CENTRAL REFILLS    Please review: prescription last written by REGINE Bacon    Patient established care on 7/9/25    Future Appointments   Date Time Provider Department Center   8/20/2025  5:30 PM Isaac Carr MD [facility]

## 2025-07-28 NOTE — TELEPHONE ENCOUNTER
Per 7/10/25 lab TSH lab result:  Isaac Carr MD  7/12/2025  6:50 PM CDT  Your TSH is 3.028 and Free t4 1.2 is within normal limits so your thyroid supplementation dose of 75mcg daily is correct dose.

## 2025-08-20 ENCOUNTER — OFFICE VISIT (OUTPATIENT)
Dept: INTERNAL MEDICINE CLINIC | Facility: CLINIC | Age: 65
End: 2025-08-20

## 2025-08-20 VITALS
HEIGHT: 66 IN | BODY MASS INDEX: 21.53 KG/M2 | HEART RATE: 57 BPM | DIASTOLIC BLOOD PRESSURE: 82 MMHG | WEIGHT: 134 LBS | SYSTOLIC BLOOD PRESSURE: 138 MMHG

## 2025-08-20 DIAGNOSIS — Z00.00 ENCOUNTER FOR ANNUAL WELLNESS EXAM IN MEDICARE PATIENT: Primary | ICD-10-CM

## 2025-08-20 DIAGNOSIS — Z00.00 ANNUAL PHYSICAL EXAM: ICD-10-CM

## 2025-08-20 DIAGNOSIS — F17.200 TOBACCO USE DISORDER: ICD-10-CM

## 2025-08-20 DIAGNOSIS — E78.2 MIXED HYPERLIPIDEMIA: ICD-10-CM

## 2025-08-20 DIAGNOSIS — Z71.6 ENCOUNTER FOR TOBACCO USE CESSATION COUNSELING: ICD-10-CM

## 2025-08-20 DIAGNOSIS — E55.9 VITAMIN D DEFICIENCY: ICD-10-CM

## 2025-08-20 DIAGNOSIS — H25.013 CORTICAL AGE-RELATED CATARACT OF BOTH EYES: ICD-10-CM

## 2025-08-20 DIAGNOSIS — Z82.49 FAMILY HISTORY OF EARLY CAD: ICD-10-CM

## 2025-08-20 DIAGNOSIS — Z78.0 ENCOUNTER FOR OSTEOPOROSIS SCREENING IN ASYMPTOMATIC POSTMENOPAUSAL PATIENT: ICD-10-CM

## 2025-08-20 DIAGNOSIS — Z12.4 CERVICAL CANCER SCREENING: ICD-10-CM

## 2025-08-20 DIAGNOSIS — Z13.820 ENCOUNTER FOR OSTEOPOROSIS SCREENING IN ASYMPTOMATIC POSTMENOPAUSAL PATIENT: ICD-10-CM

## 2025-08-20 DIAGNOSIS — I25.10 CORONARY ARTERY DISEASE INVOLVING NATIVE CORONARY ARTERY OF NATIVE HEART WITHOUT ANGINA PECTORIS: ICD-10-CM

## 2025-08-20 DIAGNOSIS — D24.1 BREAST FIBROADENOMA IN FEMALE, RIGHT: ICD-10-CM

## 2025-08-20 DIAGNOSIS — I10 ESSENTIAL HYPERTENSION: ICD-10-CM

## 2025-08-20 RX ORDER — ROSUVASTATIN CALCIUM 20 MG/1
20 TABLET, COATED ORAL 2 TIMES DAILY
Qty: 180 TABLET | Refills: 3 | Status: SHIPPED | OUTPATIENT
Start: 2025-08-20 | End: 2025-08-21

## 2025-08-20 RX ORDER — LOSARTAN POTASSIUM 100 MG/1
100 TABLET ORAL DAILY
Qty: 90 TABLET | Refills: 1 | Status: SHIPPED | OUTPATIENT
Start: 2025-08-20

## 2025-08-21 ENCOUNTER — TELEPHONE (OUTPATIENT)
Dept: INTERNAL MEDICINE CLINIC | Facility: CLINIC | Age: 65
End: 2025-08-21

## 2025-08-21 DIAGNOSIS — E78.2 MIXED HYPERLIPIDEMIA: Primary | ICD-10-CM

## 2025-08-21 RX ORDER — ROSUVASTATIN CALCIUM 40 MG/1
TABLET, COATED ORAL
Qty: 90 TABLET | Refills: 3 | Status: SHIPPED | OUTPATIENT
Start: 2025-08-21

## 2025-08-21 RX ORDER — ROSUVASTATIN CALCIUM 40 MG/1
40 TABLET, COATED ORAL NIGHTLY
Qty: 90 TABLET | Refills: 3 | Status: SHIPPED | OUTPATIENT
Start: 2025-08-21 | End: 2025-08-21

## 2025-08-22 ENCOUNTER — LAB ENCOUNTER (OUTPATIENT)
Dept: LAB | Age: 65
End: 2025-08-22
Attending: STUDENT IN AN ORGANIZED HEALTH CARE EDUCATION/TRAINING PROGRAM

## 2025-08-22 DIAGNOSIS — E55.9 VITAMIN D DEFICIENCY: ICD-10-CM

## 2025-08-22 DIAGNOSIS — Z00.00 ANNUAL PHYSICAL EXAM: ICD-10-CM

## 2025-08-22 DIAGNOSIS — Z82.49 FAMILY HISTORY OF EARLY CAD: ICD-10-CM

## 2025-08-22 DIAGNOSIS — I10 ESSENTIAL HYPERTENSION: ICD-10-CM

## 2025-08-22 DIAGNOSIS — I25.10 CORONARY ARTERY DISEASE INVOLVING NATIVE CORONARY ARTERY OF NATIVE HEART WITHOUT ANGINA PECTORIS: ICD-10-CM

## 2025-08-22 DIAGNOSIS — E78.2 MIXED HYPERLIPIDEMIA: ICD-10-CM

## 2025-08-22 LAB
ALBUMIN SERPL-MCNC: 4.8 G/DL (ref 3.2–4.8)
ALBUMIN/GLOB SERPL: 2.1 (ref 1–2)
ALP LIVER SERPL-CCNC: 97 U/L (ref 50–130)
ALT SERPL-CCNC: 20 U/L (ref 10–49)
ANION GAP SERPL CALC-SCNC: 7 MMOL/L (ref 0–18)
AST SERPL-CCNC: 19 U/L (ref ?–34)
ATRIAL RATE: 57 BPM
BASOPHILS # BLD AUTO: 0.04 X10(3) UL (ref 0–0.2)
BASOPHILS NFR BLD AUTO: 0.8 %
BILIRUB SERPL-MCNC: 0.4 MG/DL (ref 0.2–1.1)
BUN BLD-MCNC: 12 MG/DL (ref 9–23)
BUN/CREAT SERPL: 13.2 (ref 10–20)
CALCIUM BLD-MCNC: 9.6 MG/DL (ref 8.7–10.4)
CHLORIDE SERPL-SCNC: 105 MMOL/L (ref 98–112)
CHOLEST SERPL-MCNC: 258 MG/DL (ref ?–200)
CO2 SERPL-SCNC: 30 MMOL/L (ref 21–32)
CREAT BLD-MCNC: 0.91 MG/DL (ref 0.55–1.02)
DEPRECATED RDW RBC AUTO: 42.1 FL (ref 35.1–46.3)
EGFRCR SERPLBLD CKD-EPI 2021: 70 ML/MIN/1.73M2 (ref 60–?)
EOSINOPHIL # BLD AUTO: 0.1 X10(3) UL (ref 0–0.7)
EOSINOPHIL NFR BLD AUTO: 2 %
ERYTHROCYTE [DISTWIDTH] IN BLOOD BY AUTOMATED COUNT: 13.7 % (ref 11–15)
EST. AVERAGE GLUCOSE BLD GHB EST-MCNC: 123 MG/DL (ref 68–126)
FASTING PATIENT LIPID ANSWER: YES
FASTING STATUS PATIENT QL REPORTED: YES
GLOBULIN PLAS-MCNC: 2.3 G/DL (ref 2–3.5)
GLUCOSE BLD-MCNC: 112 MG/DL (ref 70–99)
HBA1C MFR BLD: 5.9 % (ref ?–5.7)
HCT VFR BLD AUTO: 43 % (ref 35–48)
HDLC SERPL-MCNC: 49 MG/DL (ref 40–59)
HGB BLD-MCNC: 15.6 G/DL (ref 12–16)
IMM GRANULOCYTES # BLD AUTO: 0.01 X10(3) UL (ref 0–1)
IMM GRANULOCYTES NFR BLD: 0.2 %
LDLC SERPL CALC-MCNC: 137 MG/DL (ref ?–100)
LYMPHOCYTES # BLD AUTO: 2.13 X10(3) UL (ref 1–4)
LYMPHOCYTES NFR BLD AUTO: 42.8 %
MCH RBC QN AUTO: 34.5 PG (ref 26–34)
MCHC RBC AUTO-ENTMCNC: 36.3 G/DL (ref 31–37)
MCV RBC AUTO: 95.1 FL (ref 80–100)
MONOCYTES # BLD AUTO: 0.49 X10(3) UL (ref 0.1–1)
MONOCYTES NFR BLD AUTO: 9.8 %
NEUTROPHILS # BLD AUTO: 2.21 X10 (3) UL (ref 1.5–7.7)
NEUTROPHILS # BLD AUTO: 2.21 X10(3) UL (ref 1.5–7.7)
NEUTROPHILS NFR BLD AUTO: 44.4 %
NONHDLC SERPL-MCNC: 209 MG/DL (ref ?–130)
OSMOLALITY SERPL CALC.SUM OF ELEC: 295 MOSM/KG (ref 275–295)
P AXIS: 74 DEGREES
P-R INTERVAL: 166 MS
PLATELET # BLD AUTO: 155 10(3)UL (ref 150–450)
POTASSIUM SERPL-SCNC: 4.7 MMOL/L (ref 3.5–5.1)
PROT SERPL-MCNC: 7.1 G/DL (ref 5.7–8.2)
Q-T INTERVAL: 458 MS
QRS DURATION: 86 MS
QTC CALCULATION (BEZET): 445 MS
R AXIS: -17 DEGREES
RBC # BLD AUTO: 4.52 X10(6)UL (ref 3.8–5.3)
SODIUM SERPL-SCNC: 142 MMOL/L (ref 136–145)
T AXIS: 43 DEGREES
TRIGL SERPL-MCNC: 394 MG/DL (ref 30–149)
VENTRICULAR RATE: 57 BPM
VIT D+METAB SERPL-MCNC: 27.3 NG/ML (ref 30–100)
VLDLC SERPL CALC-MCNC: 75 MG/DL (ref 0–30)
WBC # BLD AUTO: 5 X10(3) UL (ref 4–11)

## 2025-08-22 PROCEDURE — 36415 COLL VENOUS BLD VENIPUNCTURE: CPT

## 2025-08-22 PROCEDURE — 80053 COMPREHEN METABOLIC PANEL: CPT

## 2025-08-22 PROCEDURE — 85025 COMPLETE CBC W/AUTO DIFF WBC: CPT

## 2025-08-22 PROCEDURE — 93005 ELECTROCARDIOGRAM TRACING: CPT

## 2025-08-22 PROCEDURE — 80061 LIPID PANEL: CPT

## 2025-08-22 PROCEDURE — 93010 ELECTROCARDIOGRAM REPORT: CPT | Performed by: INTERNAL MEDICINE

## 2025-08-22 PROCEDURE — 83036 HEMOGLOBIN GLYCOSYLATED A1C: CPT

## 2025-08-22 PROCEDURE — 83695 ASSAY OF LIPOPROTEIN(A): CPT

## 2025-08-22 PROCEDURE — 82306 VITAMIN D 25 HYDROXY: CPT

## 2025-08-23 ENCOUNTER — LAB ENCOUNTER (OUTPATIENT)
Dept: LAB | Age: 65
End: 2025-08-23
Attending: STUDENT IN AN ORGANIZED HEALTH CARE EDUCATION/TRAINING PROGRAM

## 2025-08-23 DIAGNOSIS — Z00.00 ANNUAL PHYSICAL EXAM: ICD-10-CM

## 2025-08-23 DIAGNOSIS — Z82.49 FAMILY HISTORY OF EARLY CAD: ICD-10-CM

## 2025-08-23 LAB
CREAT UR-SCNC: 52.3 MG/DL
MICROALBUMIN UR-MCNC: <0.3 MG/DL

## 2025-08-23 PROCEDURE — 82570 ASSAY OF URINE CREATININE: CPT

## 2025-08-23 PROCEDURE — 82043 UR ALBUMIN QUANTITATIVE: CPT

## 2025-08-25 LAB — LIPOPROTEIN (A): 9.9 NMOL/L

## 2025-08-30 ENCOUNTER — HOSPITAL ENCOUNTER (OUTPATIENT)
Age: 65
Discharge: HOME | End: 2025-08-30

## 2025-08-30 ENCOUNTER — APPOINTMENT (OUTPATIENT)
Dept: GENERAL RADIOLOGY | Age: 65
End: 2025-08-30
Attending: NURSE PRACTITIONER

## 2025-08-30 VITALS
SYSTOLIC BLOOD PRESSURE: 129 MMHG | OXYGEN SATURATION: 96 % | RESPIRATION RATE: 18 BRPM | TEMPERATURE: 98 F | HEART RATE: 69 BPM | DIASTOLIC BLOOD PRESSURE: 85 MMHG

## 2025-08-30 DIAGNOSIS — M25.531 RIGHT WRIST PAIN: Primary | ICD-10-CM

## 2025-08-30 PROCEDURE — 73110 X-RAY EXAM OF WRIST: CPT | Performed by: NURSE PRACTITIONER

## 2025-08-30 RX ORDER — NAPROXEN 500 MG/1
500 TABLET ORAL 2 TIMES DAILY PRN
Qty: 30 TABLET | Refills: 0 | Status: SHIPPED | OUTPATIENT
Start: 2025-08-30 | End: 2025-09-06

## 2025-08-30 RX ORDER — HYDROCODONE BITARTRATE AND ACETAMINOPHEN 5; 325 MG/1; MG/1
1 TABLET ORAL EVERY 6 HOURS PRN
Qty: 10 TABLET | Refills: 0 | Status: SHIPPED | OUTPATIENT
Start: 2025-08-30 | End: 2025-09-04

## 2025-09-01 ENCOUNTER — TELEPHONE (OUTPATIENT)
Dept: INTERNAL MEDICINE CLINIC | Facility: CLINIC | Age: 65
End: 2025-09-01

## 2025-09-01 DIAGNOSIS — Z12.31 SCREENING MAMMOGRAM FOR BREAST CANCER: Primary | ICD-10-CM

## (undated) DIAGNOSIS — R92.8 ABNORMAL MAMMOGRAM OF RIGHT BREAST: Primary | ICD-10-CM

## (undated) DIAGNOSIS — E78.2 MIXED HYPERLIPIDEMIA: Primary | ICD-10-CM

## (undated) DEVICE — ABDOMINAL PAD: Brand: CURITY

## (undated) DEVICE — SOL H2O 1000ML BTL

## (undated) DEVICE — DRAPE SHEET LG

## (undated) DEVICE — SUTURE VICRYL 0 CT-1

## (undated) DEVICE — LAPAROTOMY: Brand: MEDLINE INDUSTRIES, INC.

## (undated) DEVICE — ENCORE® LATEX MICRO SIZE 6.5, STERILE LATEX POWDER-FREE SURGICAL GLOVE: Brand: ENCORE

## (undated) DEVICE — VIOLET BRAIDED (POLYGLACTIN 910), SYNTHETIC ABSORBABLE SUTURE: Brand: COATED VICRYL

## (undated) DEVICE — DRAPE SHEET TRANSVERSE LAP

## (undated) DEVICE — 3M™ MEDITPORE™ SOFT CLOTH TAPE 6 IN X 10 YD 12 ROLLS/CASE 2966: Brand: 3M™ MEDIPORE™

## (undated) DEVICE — LIGASURE IMPACT OPEN DEVICE

## (undated) DEVICE — PROXIMATE SKIN STAPLERS (35 WIDE) CONTAINS 35 STAINLESS STEEL STAPLES (FIXED HEAD): Brand: PROXIMATE

## (undated) DEVICE — MATERNITY KNIT PANTS,SEAMLESS: Brand: WINGS

## (undated) DEVICE — SUTURE VICRYL 0 J340H

## (undated) DEVICE — WOUND RETRACTOR AND PROTECTOR: Brand: ALEXIS O WOUND PROTECTOR-RETRACTOR

## (undated) NOTE — LETTER
[address] [place] [address]  Authorization for Surgical Operation or Procedure  Date: ______________       Time: _______________  1. I hereby authorize    , my physician and the assistant, to perform the following operation and/or procedure:  Ultrasound guided right breast biopsy with clip placement    on [place] at [facility].    2. My physician has explained the nature and purpose of the operation or other procedure, possible alternative methods of treatment, the risks involved and the possibility of complications to me. I understand the probable consequences of declining the recommended procedure and the alternative methods of treatment. I acknowledge that no guarantee has been made as to the result that may be obtained. 3. I recognize that during the course of this operation or other procedure, unforeseen conditions may necessitate additional or different procedures than those listed above. I, therefore, further authorize and request that the above-named physician, his/her physician assistants, or designees perform such procedures as are, in hist/her professional opinion, necessary and desirable. 4. Should the need arise during my operation or immediate post-operative period; I also consent to the administration of blood and/or blood products. Further, I understand that despite careful testing and screening of blood and blood products by collecting agencies, I may still be subject to ill effects as a result of receiving a blood transfusion and/or blood products. The following are some, but not all, of the potential risks that can occur: fever and allergic reactions, hemolytic reactions, transmission of diseases such as hepatitis, AIDS, and cytomegalovirus (CMV), and fluid overload. In the event that I wish to have an autologous transfusion of my own blood, or a directed donor transfusion, I will discuss this with my physician.     5. I consent to the photographing of the operations or procedures to be performed for the purposes of advancing medicine, science, and/or education, provided my identity is not revealed. If the procedure has been videotaped, the physician/surgeon will obtain the original videotape. The [facility] will not be responsible for storage or maintenance of this tape. 6. I consent to the presence of a  or observer as deemed necessary by my physician or his/her designees. 7. Any tissues or organs removed in the operation or other procedure may be disposed of by and at the discretion of [facility].    8. I understand that the physician and his/her physician assistants are not employees or agents of [facility], [facility], nor [facility], but are independent medical practitioners who have been permitted to use its facilities for the care and treatment of their patients. 9. Patients having a sterilization procedure: I understand that if the procedure is successful the results will be permanent and it will therefore be impossible for me to inseminate, conceive, or bear children. I also understand that the procedure is intended to result in sterility, although the result has not been guaranteed. 10. I CERTIFY THAT I HAVE READ AND FULLY UNDERSTAND THE ABOVE CONSENT TO OPERATION and/or OTHER PROCEDURE.      Signature of Patient:   _______________________________________________________________  Responsible person in case of minor, unconscious: ________________________________________  Relationship to patient: ______________________________________________________________    Signature of Witness: _________________________________Date: ___________Time: __________    Statement of Physician: My signature below affirms that prior to the time of the procedure, I have explained to the patient and/or her guardian, the risks and benefits involved in the proposed treatment and any reasonable alternative to the proposed treatment. I have also explained the risks and benefits involved in the refusal of the proposed treatment and have answered the patient's questions. If I have a significant financial interest in a co-management agreement or a significant financial interest in any product or implant, or other significant relationship used in the procedure/surgery, I have disclosed this and had a discussion with my patient.   Signature of Physician:   _________________________________Date:_____________Time:________    Patient Name: Maurilio Odonnell : 1960  Printed: 2022   Medical Record #: [id number]

## (undated) NOTE — LETTER
12/15/20        [place] [address]      Dear Glenn Carlin,    [address] [facility] records indicate that you have outstanding lab work and or testing that was ordered for you and has not yet been completed:  Orders Placed This Encoun

## (undated) NOTE — LETTER
12/3/2018              [address]        [facility] [address]         Dear Princess Suh,    It was a pleasure to see you.   Your pathology report from your hysterectomy was normal.  There is no need for further testing at

## (undated) NOTE — LETTER
08/26/21        [place] [address]      Dear [facility],    [facility] records indicate that you have outstanding lab work and or testing that was ordered for you and has not yet been completed:  Orders Placed This Encoun

## (undated) NOTE — LETTER
AUTHORIZATION FOR SURGICAL OPERATION OR OTHER PROCEDURE    1.  I hereby authorize [facility] Nohelia Mar, [facility] assigned to my case to perform the following operation and/or procedure at the [facility]:    Colposcopy with Possible B Patient Name:  ______________________________________________________  (please print)      Patient signature:  ___________________________________________________             Relationship to Patient:           []  Parent    Responsible person

## (undated) NOTE — LETTER
AUTHORIZATION FOR SURGICAL OPERATION OR OTHER PROCEDURE    1. I hereby authorize Dr. Talha Chiu, and [facility] staff assigned to my case to perform the following operation and/or procedure at the [facility] site:    _______________________________________________________________________________________________    Biopsy of lesion of palate   _______________________________________________________________________________________________    2.  My physician has explained the nature and purpose of the operation or other procedure, possible alternative methods of treatment, the risks involved, and the possibility of complication to me.  I acknowledge that no guarantee has been made as to the result that may be obtained.  3.  I recognize that, during the course of this operation, or other procedure, unforseen conditions may necessitate additional or different procedure than those listed above.  I, therefore, further authorize and request that the above named physician, his/her physician assistants or designees perform such procedures as are, in his/her professional opinion, necessary and desirable.  4.  Any tissue or organs removed in the operation or other procedure may be disposed of by and at the discretion of the [facility] and [facility].  5.  I understand that in the event of a medical emergency, I will be transported by local paramedics to [facility] or other hospital emergency department.  6.  I certify that I have read and fully understand the above consent to operation and/or other procedure.    7.  I acknowledge that my physician has explained sedation/analgesia administration to me including the risks and benefits.  I consent to the administration of sedation/analgesia as may be necessary or desirable in the judgement of my physician.    Witness signature: ___________________________________________________ Date:  ______/______/_____                     Time:  ________ A.M.  P.M.       Patient Name:  ______________________________________________________  (please print)      Patient signature:  ___________________________________________________             Relationship to Patient:           []  Parent    Responsible person                          []  Spouse  In case of minor or                    [] Other  _____________   Incompetent name:  __________________________________________________                               (please print)      _____________      Responsible person  In case of minor or  Incompetent signature:  _______________________________________________    Statement of Physician  My signature below affirms that prior to the time of the procedure, I have explained to the patient and/or his/her guardian, the risks and benefits involved in the proposed treatment and any reasonable alternative to the proposed treatment.  I have also explained the risks and benefits involved in the refusal of the proposed treatment and have answered the patient's questions.                        Date:  ______/______/_______  Provider                      Signature:  __________________________________________________________       Time:  ___________ A.M    P.M.

## (undated) NOTE — LETTER
24      Patient: Merary Mckeon  : 1960 Visit date: 2024    Dear Alexandra,      I examined your patient in consultation today.    She has mild bilateral carpal tunnel syndrome.  We have placed her in night splints and a short course of anti-inflammatory.    She has an asymptomatic Dupuytren's nodule and cords of the left palm.  Nothing need be done for this at present.    Thank you for your kind referral. If I may answer any questions, please feel free to contact me.     Sincerely,   Shahzad Edwards MD     CC:   No Recipients

## (undated) NOTE — LETTER
1/10/2024          To Whom It May Concern:    Merary Mckeon is currently under my medical care and may not return to work at this time.    Please excuse Merary for 1 weeks.  She may return to work on1/22/2024  Activity is restricted as follows: none.  /  If you require additional information please contact our office.        Sincerely,      REGINE Clark          Document generated by:  REGINE Clark

## (undated) NOTE — LETTER
05/17/21        Zelalem Desai  [address]      Dear Carlitos Beasley,    [facility] records indicate that you have outstanding lab work and or testing that was ordered for you and has not yet been completed:  Orders Placed This Encoun

## (undated) NOTE — LETTER
10/17/18        Siva Padilla  [address] [place] [address]      Dear Alissa Miller records indicate that you have outstanding lab work and or testing that was ordered for you and has not yet been completed:  Orders Placed This Encounter